# Patient Record
Sex: MALE | Race: WHITE | NOT HISPANIC OR LATINO | Employment: OTHER | URBAN - METROPOLITAN AREA
[De-identification: names, ages, dates, MRNs, and addresses within clinical notes are randomized per-mention and may not be internally consistent; named-entity substitution may affect disease eponyms.]

---

## 2019-12-09 ENCOUNTER — TELEPHONE (OUTPATIENT)
Dept: FAMILY MEDICINE CLINIC | Facility: CLINIC | Age: 74
End: 2019-12-09

## 2019-12-09 NOTE — TELEPHONE ENCOUNTER
Patient calling, states has Dr Jin Olson listed on his [facility] card, has never been patient here  Complains of elevated blood pressure 192/83  No pain, no headaches, no dizziness  He had seen a dentist for a broken tooth and is on antibiotic  Had BP done at dentist and checked again at [facility] and was around the same    Please advise

## 2019-12-09 NOTE — TELEPHONE ENCOUNTER
Called and left message for patient to call back and give us more information, and to schedule appt

## 2019-12-10 ENCOUNTER — OFFICE VISIT (OUTPATIENT)
Dept: FAMILY MEDICINE CLINIC | Facility: CLINIC | Age: 74
End: 2019-12-10
Payer: COMMERCIAL

## 2019-12-10 ENCOUNTER — TELEPHONE (OUTPATIENT)
Dept: FAMILY MEDICINE CLINIC | Facility: CLINIC | Age: 74
End: 2019-12-10

## 2019-12-10 VITALS
SYSTOLIC BLOOD PRESSURE: 180 MMHG | HEIGHT: 68 IN | HEART RATE: 80 BPM | RESPIRATION RATE: 16 BRPM | TEMPERATURE: 97.7 F | DIASTOLIC BLOOD PRESSURE: 90 MMHG | WEIGHT: 217 LBS | BODY MASS INDEX: 32.89 KG/M2

## 2019-12-10 DIAGNOSIS — I10 ESSENTIAL HYPERTENSION: Primary | ICD-10-CM

## 2019-12-10 DIAGNOSIS — E66.09 CLASS 1 OBESITY DUE TO EXCESS CALORIES WITH SERIOUS COMORBIDITY AND BODY MASS INDEX (BMI) OF 33.0 TO 33.9 IN ADULT: ICD-10-CM

## 2019-12-10 DIAGNOSIS — Z87.891 FORMER SMOKER: ICD-10-CM

## 2019-12-10 PROBLEM — E66.811 CLASS 1 OBESITY DUE TO EXCESS CALORIES WITH SERIOUS COMORBIDITY AND BODY MASS INDEX (BMI) OF 33.0 TO 33.9 IN ADULT: Status: ACTIVE | Noted: 2019-12-10

## 2019-12-10 PROCEDURE — 93000 ELECTROCARDIOGRAM COMPLETE: CPT | Performed by: NURSE PRACTITIONER

## 2019-12-10 PROCEDURE — 3725F SCREEN DEPRESSION PERFORMED: CPT | Performed by: NURSE PRACTITIONER

## 2019-12-10 PROCEDURE — 99204 OFFICE O/P NEW MOD 45 MIN: CPT | Performed by: NURSE PRACTITIONER

## 2019-12-10 RX ORDER — CHLORAL HYDRATE 500 MG
1000 CAPSULE ORAL DAILY
COMMUNITY

## 2019-12-10 RX ORDER — ASPIRIN 81 MG/1
81 TABLET ORAL DAILY
COMMUNITY

## 2019-12-10 NOTE — PATIENT INSTRUCTIONS
Start Lisinopril 10 mg one tab daily  Check blood pressure daily and keep a log of your BP readings  Bring this log to your follow up appointment in one week  Have blood work done    Blood work is fasting for 10-12 hours except for water and your Lisinopril     EKG today is normal     Limit salt intake and try to decrease caffeine

## 2019-12-10 NOTE — TELEPHONE ENCOUNTER
Spoke with patient and he reports to me that his last BP was 193/89  Patient denies SOB, chest pain, headaches, dizziness any symptoms of any kind  Patient told me that is had been over 10 plus years since he has seen a physician and back then he had always had a lower BP  I advised pt that is he developed any of the above symptoms to go the the ER right away  I also asked that he keep a BP log for us till his appt (he has to go to the pharmacy for that)  I put the patient with you on 12/16/19 @ 1pm, if you would like to see him earlier let me know and I can call him back

## 2019-12-10 NOTE — ASSESSMENT & PLAN NOTE
BP uncontrolled  Start Lisinopril 10 mg daily  This will likely need to be titrated up  Check BP twice daily and keep a log  Bring log to follow up appt in 1 week  Have lab work done now  EKG today showing SR, no acute changes or ST-T wave changes  Diet modifications reviewed, limit sodium intake, decrease caffeine consumption  Work on weight loss  I recommended having an echo done to r/o any structural defects/ valvular insufficiencies but he declined    Noted he was reluctant to come to the office today but his wife made him

## 2019-12-10 NOTE — ASSESSMENT & PLAN NOTE
Recommended having the low dose chest CT given his significant smoking history but he declined this option   He stopped smoking one week ago (cold turkey) and feels he is doing well with this

## 2019-12-10 NOTE — TELEPHONE ENCOUNTER
Patient phoned to state his blood pressure medicine was not sent to [address] [place] [address] Way  Patient can be contacted at [phone]

## 2019-12-10 NOTE — PROGRESS NOTES
Assessment/Plan:    Essential hypertension  BP uncontrolled  Start Lisinopril 10 mg daily  This will likely need to be titrated up  Check BP twice daily and keep a log  Bring log to follow up appt in 1 week  Have lab work done now  EKG today showing SR, no acute changes or ST-T wave changes  Diet modifications reviewed, limit sodium intake, decrease caffeine consumption  Work on weight loss  I recommended having an echo done to r/o any structural defects/ valvular insufficiencies but he declined  Noted he was reluctant to come to the office today but his wife made him    Former smoker  Recommended having the low dose chest CT given his significant smoking history but he declined this option   He stopped smoking one week ago (cold turkey) and feels he is doing well with this    Class 1 obesity due to excess calories with serious comorbidity and body mass index (BMI) of 33 0 to 33 9 in adult  Encouraged regular exercise, work on weight loss    We did discuss health maintenance today including CRC screening and vaccinations but he would like to deal with his HTN first seeing as he hasn't been to a doctor's office in over 10 years  We will re-visit these topics at his next appointment     Diagnoses and all orders for this visit:    Essential hypertension  -     CBC and differential; Future  -     Comprehensive metabolic panel; Future  -     Lipid panel; Future  -     TSH, 3rd generation with Free T4 reflex; Future  -     POCT ECG    Class 1 obesity due to excess calories with serious comorbidity and body mass index (BMI) of 33 0 to 33 9 in adult  -     CBC and differential; Future  -     Comprehensive metabolic panel; Future  -     Lipid panel; Future  -     TSH, 3rd generation with Free T4 reflex; Future    Former smoker    Other orders  -     Omega-3 Fatty Acids (FISH OIL) 1,000 mg; Take 1,000 mg by mouth daily  -     aspirin (ECOTRIN LOW STRENGTH) 81 mg EC tablet;  Take 81 mg by mouth daily          Subjective: Patient ID: Thersia Primrose is a 75 y o  male  HPI     Pt presents by himself today as a new patient    Pt was recently at his Dentist's office last Tuesday, 12/3/19 and is being treated for a broken tooth/ tooth abscess  His BP at that office was noted to be 192/83  Reports he was in a lot of pain that day  He has since [place] [facility] [place] has been getting similar BP readings   Last seen by a PCP provider over 10 years ago  Denies a H/O HTN or cardiovascular conditions   He denies any significant past medical history although he notes his best friend was a physician who he used to ask questions rather than formally being evaluated   He doesn't believe he has ever had blood work done    He denies chest pain, palpitations, edema, SOB, dizziness, tinnitus, headaches, visual changes  He reports he is able to walk up a flight of stairs with no issues    He is currently taking Fish Oil and ASA 81 mg on a daily basis    Drinks 6 cups of coffee daily   Diet is high in sodium     Former smoker, quit one week ago after being told his BP was elevated, smoked for at least 60 years ago, smoked 1 ppd   ETOH use- socially     Mom passed away of some type of cancer but he isn't sure which type   Dad passed away around early 76s with an MI    Current BMI at 32 99    The following portions of the patient's history were reviewed and updated as appropriate: allergies, current medications, past family history, past medical history, past social history, past surgical history and problem list     Review of Systems   Constitutional: Negative for chills, fatigue and fever  Respiratory: Negative for cough, shortness of breath and wheezing  Cardiovascular: Negative for chest pain, palpitations and leg swelling  Gastrointestinal: Negative for abdominal pain, blood in stool, constipation, diarrhea and nausea  Genitourinary: Negative for dysuria     Musculoskeletal: Negative for arthralgias and myalgias  Skin: Negative for rash and wound  Neurological: Negative for dizziness, weakness, numbness and headaches  Psychiatric/Behavioral: Negative for sleep disturbance and suicidal ideas  Objective:      BP (!) 180/90   Pulse 80   Temp 97 7 °F (36 5 °C) (Tympanic)   Resp 16   Ht 5' 8" (1 727 m)   Wt 98 4 kg (217 lb)   BMI 32 99 kg/m²          Physical Exam   Constitutional: He is oriented to person, place, and time  He appears well-developed and well-nourished  No distress  Obese   HENT:   Head: Normocephalic and atraumatic  Eyes: Pupils are equal, round, and reactive to light  Conjunctivae are normal    Wearing eyeglasses   Neck: Normal range of motion  Neck supple  No thyromegaly present  Cardiovascular: Normal rate, regular rhythm and normal heart sounds  No murmur heard  No LE edema  No carotid bruits   Pulmonary/Chest: Effort normal and breath sounds normal  No respiratory distress  He has no wheezes  Abdominal: Soft  Bowel sounds are normal  He exhibits no distension  There is no tenderness  Musculoskeletal: Normal range of motion  Lymphadenopathy:     He has no cervical adenopathy  Neurological: He is alert and oriented to person, place, and time  Skin: Skin is warm and dry  He is not diaphoretic  Psychiatric: He has a normal mood and affect  BMI Counseling: Body mass index is 32 99 kg/m²  The BMI is above normal  Nutrition recommendations include reducing portion sizes, decreasing overall calorie intake, 3-5 servings of fruits/vegetables daily, reducing fast food intake, consuming healthier snacks, decreasing soda and/or juice intake, moderation in carbohydrate intake, increasing intake of lean protein, reducing intake of saturated fat and trans fat and reducing intake of cholesterol  Exercise recommendations include exercising 3-5 times per week

## 2019-12-11 DIAGNOSIS — I10 ESSENTIAL HYPERTENSION: Primary | ICD-10-CM

## 2019-12-11 RX ORDER — LISINOPRIL 10 MG/1
10 TABLET ORAL DAILY
Qty: 30 TABLET | Refills: 5 | Status: SHIPPED | OUTPATIENT
Start: 2019-12-11 | End: 2019-12-16

## 2019-12-11 NOTE — TELEPHONE ENCOUNTER
Patient called and stated that Alvina Whelan was suppose to send a medication to the pharmacy when she saw him yesterday and it looks like she forgot  Can you please place the order for him   Thank you

## 2019-12-16 ENCOUNTER — OFFICE VISIT (OUTPATIENT)
Dept: FAMILY MEDICINE CLINIC | Facility: CLINIC | Age: 74
End: 2019-12-16
Payer: COMMERCIAL

## 2019-12-16 VITALS
SYSTOLIC BLOOD PRESSURE: 158 MMHG | TEMPERATURE: 97.8 F | DIASTOLIC BLOOD PRESSURE: 76 MMHG | HEART RATE: 56 BPM | WEIGHT: 216 LBS | HEIGHT: 68 IN | RESPIRATION RATE: 16 BRPM | BODY MASS INDEX: 32.74 KG/M2

## 2019-12-16 DIAGNOSIS — I10 ESSENTIAL HYPERTENSION: Primary | ICD-10-CM

## 2019-12-16 DIAGNOSIS — Z23 NEED FOR PNEUMOCOCCAL VACCINATION: ICD-10-CM

## 2019-12-16 DIAGNOSIS — E66.09 CLASS 1 OBESITY DUE TO EXCESS CALORIES WITH SERIOUS COMORBIDITY AND BODY MASS INDEX (BMI) OF 33.0 TO 33.9 IN ADULT: ICD-10-CM

## 2019-12-16 DIAGNOSIS — E78.2 MIXED HYPERLIPIDEMIA: Primary | ICD-10-CM

## 2019-12-16 DIAGNOSIS — Z87.891 FORMER SMOKER: ICD-10-CM

## 2019-12-16 LAB
ALBUMIN SERPL-MCNC: 4.1 G/DL (ref 3.5–4.8)
ALBUMIN/GLOB SERPL: 1.4 {RATIO} (ref 1.2–2.2)
ALP SERPL-CCNC: 137 IU/L (ref 39–117)
ALT SERPL-CCNC: 28 IU/L (ref 0–44)
AST SERPL-CCNC: 22 IU/L (ref 0–40)
BASOPHILS # BLD AUTO: 0.1 X10E3/UL (ref 0–0.2)
BASOPHILS NFR BLD AUTO: 1 %
BILIRUB SERPL-MCNC: 0.3 MG/DL (ref 0–1.2)
BUN SERPL-MCNC: 15 MG/DL (ref 8–27)
BUN/CREAT SERPL: 13 (ref 10–24)
CALCIUM SERPL-MCNC: 9 MG/DL (ref 8.6–10.2)
CHLORIDE SERPL-SCNC: 104 MMOL/L (ref 96–106)
CHOLEST SERPL-MCNC: 184 MG/DL (ref 100–199)
CO2 SERPL-SCNC: 22 MMOL/L (ref 20–29)
CREAT SERPL-MCNC: 1.15 MG/DL (ref 0.76–1.27)
EOSINOPHIL # BLD AUTO: 0.5 X10E3/UL (ref 0–0.4)
EOSINOPHIL NFR BLD AUTO: 6 %
ERYTHROCYTE [DISTWIDTH] IN BLOOD BY AUTOMATED COUNT: 13.1 % (ref 12.3–15.4)
GLOBULIN SER-MCNC: 2.9 G/DL (ref 1.5–4.5)
GLUCOSE SERPL-MCNC: 97 MG/DL (ref 65–99)
HCT VFR BLD AUTO: 40.8 % (ref 37.5–51)
HDLC SERPL-MCNC: 26 MG/DL
HGB BLD-MCNC: 13.7 G/DL (ref 13–17.7)
IMM GRANULOCYTES # BLD: 0 X10E3/UL (ref 0–0.1)
IMM GRANULOCYTES NFR BLD: 0 %
LDLC SERPL CALC-MCNC: 105 MG/DL (ref 0–99)
LYMPHOCYTES # BLD AUTO: 1.7 X10E3/UL (ref 0.7–3.1)
LYMPHOCYTES NFR BLD AUTO: 20 %
MCH RBC QN AUTO: 31.3 PG (ref 26.6–33)
MCHC RBC AUTO-ENTMCNC: 33.6 G/DL (ref 31.5–35.7)
MCV RBC AUTO: 93 FL (ref 79–97)
MONOCYTES # BLD AUTO: 0.8 X10E3/UL (ref 0.1–0.9)
MONOCYTES NFR BLD AUTO: 10 %
NEUTROPHILS # BLD AUTO: 5.4 X10E3/UL (ref 1.4–7)
NEUTROPHILS NFR BLD AUTO: 63 %
PLATELET # BLD AUTO: 350 X10E3/UL (ref 150–450)
POTASSIUM SERPL-SCNC: 4.4 MMOL/L (ref 3.5–5.2)
PROT SERPL-MCNC: 7 G/DL (ref 6–8.5)
RBC # BLD AUTO: 4.38 X10E6/UL (ref 4.14–5.8)
SL AMB EGFR AFRICAN AMERICAN: 72 ML/MIN/1.73
SL AMB EGFR NON AFRICAN AMERICAN: 62 ML/MIN/1.73
SL AMB VLDL CHOLESTEROL CALC: 53 MG/DL (ref 5–40)
SODIUM SERPL-SCNC: 140 MMOL/L (ref 134–144)
TRIGL SERPL-MCNC: 263 MG/DL (ref 0–149)
TSH SERPL DL<=0.005 MIU/L-ACNC: 1.43 UIU/ML (ref 0.45–4.5)
WBC # BLD AUTO: 8.5 X10E3/UL (ref 3.4–10.8)

## 2019-12-16 PROCEDURE — 1160F RVW MEDS BY RX/DR IN RCRD: CPT | Performed by: NURSE PRACTITIONER

## 2019-12-16 PROCEDURE — 4040F PNEUMOC VAC/ADMIN/RCVD: CPT

## 2019-12-16 PROCEDURE — 90670 PCV13 VACCINE IM: CPT

## 2019-12-16 PROCEDURE — G0009 ADMIN PNEUMOCOCCAL VACCINE: HCPCS

## 2019-12-16 PROCEDURE — 1101F PT FALLS ASSESS-DOCD LE1/YR: CPT | Performed by: NURSE PRACTITIONER

## 2019-12-16 PROCEDURE — 1036F TOBACCO NON-USER: CPT | Performed by: NURSE PRACTITIONER

## 2019-12-16 PROCEDURE — 99214 OFFICE O/P EST MOD 30 MIN: CPT | Performed by: NURSE PRACTITIONER

## 2019-12-16 RX ORDER — LISINOPRIL 20 MG/1
20 TABLET ORAL DAILY
Qty: 30 TABLET | Refills: 5 | Status: SHIPPED | OUTPATIENT
Start: 2019-12-16 | End: 2020-07-09 | Stop reason: SINTOL

## 2019-12-16 RX ORDER — ATORVASTATIN CALCIUM 10 MG/1
10 TABLET, FILM COATED ORAL DAILY
Qty: 30 TABLET | Refills: 5 | Status: SHIPPED | OUTPATIENT
Start: 2019-12-16 | End: 2022-03-01

## 2019-12-16 NOTE — ASSESSMENT & PLAN NOTE
BP improved but still not optimized  Increase Lisinopril to 20 mg daily   Continue to check BP twice daily and call with readings in one week  Follow a low sodium diet

## 2019-12-16 NOTE — PATIENT INSTRUCTIONS
Increase Lisinopril to 20 mg once daily (this was sent to your pharmacy)   Continue to monitor Blood pressure daily and call with readings in one week  Limit your sodium intake  We will call you with your lab results once we have them   You may take over-the-counter Tylenol as needed, max of 3,000mg in one day  Limit use of Aleve   Return to our office in one month

## 2019-12-16 NOTE — PROGRESS NOTES
Chief Complaint   Patient presents with    Follow-up     hypertension     Health Maintenance   Topic Date Due    Medicare Annual Wellness Visit (AWV)  1945    CRC Screening: Colonoscopy  1945    DTaP,Tdap,and Td Vaccines (1 - Tdap) 11/10/1955    Fall Risk  11/10/2009    Pneumococcal Vaccine: 65+ Years (1 of 2 - PCV13) 11/10/2009    Influenza Vaccine  07/01/2019    Depression Screening PHQ  12/10/2020    BMI: Followup Plan  12/10/2020    BMI: Adult  12/10/2020    Pneumococcal Vaccine: Pediatrics (0 to 5 Years) and At-Risk Patients (6 to 59 Years)  Aged Out    HIB Vaccine  Aged Out    Hepatitis B Vaccine  Aged Out    IPV Vaccine  Aged Out    Hepatitis A Vaccine  Aged Out    Meningococcal ACWY Vaccine  Aged Out    HPV Vaccine  Aged Out     Assessment/Plan:    Declined the flu vaccine today  Tdap not covered by his insurance  He did receive Prevnar today in the office     He has no desire to discuss CRC screenings at this time  I did inform him of all available options for this but he declined    Awaiting lab results  We will call him with instructions   RTO one month for routine follow up and AWV    Essential hypertension  BP improved but still not optimized  Increase Lisinopril to 20 mg daily   Continue to check BP twice daily and call with readings in one week  Follow a low sodium diet    Former smoker  2 weeks without smoking- congratulated on this    Class 1 obesity due to excess calories with serious comorbidity and body mass index (BMI) of 33 0 to 33 9 in adult  Encouraged regular physical activity, follow a healthy diet       Diagnoses and all orders for this visit:    Essential hypertension  -     lisinopril (ZESTRIL) 20 mg tablet;  Take 1 tablet (20 mg total) by mouth daily    Need for pneumococcal vaccination  -     PNEUMOCOCCAL CONJUGATE VACCINE 13-VALENT GREATER THAN 6 MONTHS    Class 1 obesity due to excess calories with serious comorbidity and body mass index (BMI) of 33 0 to 33 9 in adult    Former smoker          Subjective:      Patient ID: Jonel Strong is a 75 y o  male  HPI    Pt presents by himself today for a one week follow up of HTN and to review lab work   He did have his blood work completed last week at [facility] but we unfortunately, do no have these results yet     HTN- taking Lisinopril 10 mg daily  BP has been trending down nicely but still hanging in the 140s/80s  Denies chest pain, palpitations, edema, SOB, dizziness  Still hasn't smoked in the past 2 weeks  Notes he is significantly more active in the Spring as he is an avid mayra, doesn't do too much in regards to physical activity this time of year   Still drinking approximately 6 cups of coffee daily   No changes to his diet as of yet but he does plan to work on this     He is unsure of his last Tdap vaccine  Due for Prevnar and the flu vaccine    He has never had a colonoscopy     The following portions of the patient's history were reviewed and updated as appropriate: allergies, current medications, past family history, past medical history, past social history and problem list     Review of Systems   Constitutional: Negative for chills, fatigue and fever  Respiratory: Negative for cough, shortness of breath and wheezing  Cardiovascular: Negative for chest pain, palpitations and leg swelling  Gastrointestinal: Negative for abdominal pain, blood in stool, constipation, diarrhea and nausea  Genitourinary: Negative for dysuria  Musculoskeletal: Negative for arthralgias and myalgias  Skin: Negative for rash and wound  Neurological: Negative for dizziness, weakness, numbness and headaches  Psychiatric/Behavioral: Negative for sleep disturbance and suicidal ideas           Objective:      /76   Pulse 56   Temp 97 8 °F (36 6 °C) (Tympanic)   Resp 16   Ht 5' 8" (1 727 m)   Wt 98 kg (216 lb)   BMI 32 84 kg/m²          Physical Exam   Constitutional: He is oriented to person, place, and time  He appears well-developed and well-nourished  No distress  Obese    HENT:   Head: Normocephalic and atraumatic  Eyes: Pupils are equal, round, and reactive to light  Conjunctivae are normal    +eyeglasses   Neck: Normal range of motion  Neck supple  No thyromegaly present  Cardiovascular: Normal rate, regular rhythm and normal heart sounds  No murmur heard  No LE edema  No carotid bruits    Pulmonary/Chest: Effort normal and breath sounds normal  No respiratory distress  He has no wheezes  Abdominal: Soft  Bowel sounds are normal  He exhibits no distension  There is no tenderness  Musculoskeletal: Normal range of motion  Lymphadenopathy:     He has no cervical adenopathy  Neurological: He is alert and oriented to person, place, and time  Skin: Skin is warm and dry  He is not diaphoretic  Psychiatric: He has a normal mood and affect

## 2019-12-27 ENCOUNTER — TELEPHONE (OUTPATIENT)
Dept: FAMILY MEDICINE CLINIC | Facility: CLINIC | Age: 74
End: 2019-12-27

## 2019-12-27 NOTE — TELEPHONE ENCOUNTER
Spoke with pt after reviewing blood pressure log for the past 10 days  BP looks much improved overall, SBP ranges 120-141, mostly in the 130s, DBP ranges 68-86  He had one reading of 99/69 but notes this was around 5am in the morning prior to a hunting trip    He was not symptomatic with this BP reading    Plan:  Continue Lisinopril 20 mg daily, check BP BID and keep log  Bring log to f/u appt 1/13/20

## 2020-01-13 ENCOUNTER — OFFICE VISIT (OUTPATIENT)
Dept: FAMILY MEDICINE CLINIC | Facility: CLINIC | Age: 75
End: 2020-01-13
Payer: COMMERCIAL

## 2020-01-13 VITALS
SYSTOLIC BLOOD PRESSURE: 170 MMHG | RESPIRATION RATE: 20 BRPM | HEART RATE: 80 BPM | WEIGHT: 224 LBS | HEIGHT: 68 IN | TEMPERATURE: 97.4 F | OXYGEN SATURATION: 94 % | BODY MASS INDEX: 33.95 KG/M2 | DIASTOLIC BLOOD PRESSURE: 82 MMHG

## 2020-01-13 DIAGNOSIS — Z00.00 MEDICARE ANNUAL WELLNESS VISIT, INITIAL: ICD-10-CM

## 2020-01-13 DIAGNOSIS — M19.90 ARTHRITIS: ICD-10-CM

## 2020-01-13 DIAGNOSIS — Z87.891 FORMER SMOKER: ICD-10-CM

## 2020-01-13 DIAGNOSIS — I10 ESSENTIAL HYPERTENSION: Primary | ICD-10-CM

## 2020-01-13 DIAGNOSIS — E78.2 MIXED HYPERLIPIDEMIA: ICD-10-CM

## 2020-01-13 PROCEDURE — 99214 OFFICE O/P EST MOD 30 MIN: CPT | Performed by: FAMILY MEDICINE

## 2020-01-13 PROCEDURE — 1036F TOBACCO NON-USER: CPT | Performed by: FAMILY MEDICINE

## 2020-01-13 PROCEDURE — 1101F PT FALLS ASSESS-DOCD LE1/YR: CPT | Performed by: FAMILY MEDICINE

## 2020-01-13 PROCEDURE — 1125F AMNT PAIN NOTED PAIN PRSNT: CPT | Performed by: FAMILY MEDICINE

## 2020-01-13 PROCEDURE — G0438 PPPS, INITIAL VISIT: HCPCS | Performed by: FAMILY MEDICINE

## 2020-01-13 PROCEDURE — 1170F FXNL STATUS ASSESSED: CPT | Performed by: FAMILY MEDICINE

## 2020-01-13 PROCEDURE — 1160F RVW MEDS BY RX/DR IN RCRD: CPT | Performed by: FAMILY MEDICINE

## 2020-01-13 PROCEDURE — 3725F SCREEN DEPRESSION PERFORMED: CPT | Performed by: FAMILY MEDICINE

## 2020-01-13 PROCEDURE — 3288F FALL RISK ASSESSMENT DOCD: CPT | Performed by: FAMILY MEDICINE

## 2020-01-13 NOTE — PROGRESS NOTES
Chief Complaint   Patient presents with   [facility] Wellness Visit     Initial     Follow-up     From visit on 12/16/19  Health Maintenance   Topic Date Due    Medicare Annual Wellness Visit (AWV)  1945    CRC Screening: Colonoscopy  1945    DTaP,Tdap,and Td Vaccines (1 - Tdap) 11/10/1955    Influenza Vaccine  07/01/2019    Depression Screening PHQ  12/10/2020    BMI: Followup Plan  12/10/2020    Fall Risk  12/16/2020    BMI: Adult  12/16/2020    Pneumococcal Vaccine: 65+ Years (2 of 2 - PPSV23) 12/16/2020    Pneumococcal Vaccine: Pediatrics (0 to 5 Years) and At-Risk Patients (6 to 59 Years)  Aged Out    HIB Vaccine  Aged Out    Hepatitis B Vaccine  Aged Out    IPV Vaccine  Aged Out    Hepatitis A Vaccine  Aged Out    Meningococcal ACWY Vaccine  Aged Out    HPV Vaccine  Aged Out      Assessment and Plan:     Problem List Items Addressed This Visit        Cardiovascular and Mediastinum    Essential hypertension - Primary     BP at home are good  DASH diet  Continue lisinopril 20mg QD  Musculoskeletal and Integument    Arthritis     Continue aleve daily as needed  SE educated pt  Take it with food and keep hydrated  Other    BMI 34 0-34 9,adult    Former smoker     Quit smoking for 2 months  60 ppd years  Refused CT lung screen  Mixed hyperlipidemia     Low fat diet  Continue atorvastatin 10mg qhs  Other Visit Diagnoses     Medicare annual wellness visit, initial            BMI Counseling: Body mass index is 34 06 kg/m²  The BMI is above normal  Nutrition recommendations include decreasing portion sizes, encouraging healthy choices of fruits and vegetables and decreasing fast food intake  Exercise recommendations include moderate physical activity 150 minutes/week  Preventive health issues were discussed with patient, and age appropriate screening tests were ordered as noted in patient's After Visit Summary    [place] advice and appropriate referrals for health education or preventive services given if needed, as noted in patient's After Visit Summary  History of Present Illness:     Patient presents for Medicare Annual Wellness visit    Patient Care Team:  Torrey Stauffer MD as PCP - General (Family Medicine)     Problem List:     Patient Active Problem List   Diagnosis    Essential hypertension    BMI 34 0-34 9,adult    Former smoker    Mixed hyperlipidemia    Arthritis      Past Medical and Surgical History:     Past Medical History:   Diagnosis Date    Back pain      History reviewed  No pertinent surgical history     Family History:     Family History   Problem Relation Age of Onset    Cancer Mother     Heart attack Father 48      Social History:     Social History     Socioeconomic History    Marital status: /Civil Union     Spouse name: None    Number of children: None    Years of education: None    Highest education level: None   Occupational History    None   Social Needs    Financial resource strain: Not hard at all   [employer] insecurity:     Worry: Never true     Inability: Never true    Transportation needs:     Medical: No     Non-medical: No   Tobacco Use    Smoking status: Former Smoker     Packs/day: 1 00     Types: Cigarettes     Start date: 11/10/1956     Last attempt to quit: 12/3/2019     Years since quittin 1    Smokeless tobacco: Never Used   Substance and Sexual Activity    Alcohol use: Yes     Frequency: Monthly or less     Binge frequency: Never    Drug use: Never    Sexual activity: Yes     Partners: Female   Lifestyle    Physical activity:     Days per week: 1 day     Minutes per session: 30 min    Stress: Not at all   Relationships    Social connections:     Talks on phone: More than three times a week     Gets together: More than three times a week     Attends Sikhism service: More than 4 times per year     Active member of club or organization: Yes     Attends meetings of clubs or organizations: More than 4 times per year     Relationship status:     Intimate partner violence:     Fear of current or ex partner: No     Emotionally abused: No     Physically abused: No     Forced sexual activity: No   Other Topics Concern    None   Social History Narrative    None       Medications and Allergies:     Current Outpatient Medications   Medication Sig Dispense Refill    aspirin (ECOTRIN LOW STRENGTH) 81 mg EC tablet Take 81 mg by mouth daily      atorvastatin (LIPITOR) 10 mg tablet Take 1 tablet (10 mg total) by mouth daily 30 tablet 5    lisinopril (ZESTRIL) 20 mg tablet Take 1 tablet (20 mg total) by mouth daily 30 tablet 5    Omega-3 Fatty Acids (FISH OIL) 1,000 mg Take 1,000 mg by mouth daily       No current facility-administered medications for this visit  No Known Allergies   Immunizations:     Immunization History   Administered Date(s) Administered    Pneumococcal Conjugate 13-Valent 12/16/2019      Health Maintenance:         Topic Date Due    CRC Screening: Colonoscopy  1945         Topic Date Due    DTaP,Tdap,and Td Vaccines (1 - Tdap) 11/10/1955    Influenza Vaccine  07/01/2019      Medicare Health Risk Assessment:     Vitals:    01/13/20 1250   BP: 170/82   BP Location: Left arm   Patient Position: Sitting   Cuff Size: Adult   Pulse: 80   Resp: 20   Temp: (!) 97 4 °F (36 3 °C)   TempSrc: Tympanic   SpO2: 94%   Weight: 102 kg (224 lb)   Height: 5' 8" (1 727 m)     Cameron Ariza is here for his Initial Wellness visit  Health Risk Assessment:   Patient rates overall health as very good  Patient feels that their physical health rating is same  Eyesight was rated as same  Hearing was rated as same  Patient feels that their emotional and mental health rating is same  Pain experienced in the last 7 days has been none  Depression Screening:   PHQ-2 Score: 0      Fall Risk Screening:    In the past year, patient has experienced: history of falling in past year    Injured during fall?: Yes    Feels unsteady when standing or walking?: No    Worried about falling?: No      Home Safety:  Patient does not have trouble with stairs inside or outside of their home  Patient has working smoke alarms and has working carbon monoxide detector  Home safety hazards include: none  Nutrition:   Current diet is Regular  Medications:   Patient is not currently taking any over-the-counter supplements  Patient is able to manage medications  Activities of Daily Living (ADLs)/Instrumental Activities of Daily Living (IADLs):   Walk and transfer into and out of bed and chair?: Yes  Dress and groom yourself?: Yes    Bathe or shower yourself?: Yes    Feed yourself? Yes  Do your laundry/housekeeping?: Yes  Manage your money, pay your bills and track your expenses?: Yes  Make your own meals?: Yes    Do your own shopping?: Yes    Previous Hospitalizations:   Any hospitalizations or ED visits within the last 12 months?: No      Advance Care Planning:   Living will: Yes    Durable POA for healthcare:  Yes    Advanced directive: Yes    Provider agrees with end of life decisions: Yes      Cognitive Screening:   Provider or family/friend/caregiver concerned regarding cognition?: No    PREVENTIVE SCREENINGS      Cardiovascular Screening:    General: History Lipid Disorder and Screening Current      Diabetes Screening:     General: Screening Current      Colorectal Cancer Screening:     General: Risks and Benefits Discussed and Patient Declines      Prostate Cancer Screening:    General: Screening Not Indicated      Abdominal Aortic Aneurysm (AAA) Screening:    Risk factors include: age between 73-69 yo and tobacco use        General: Risks and Benefits Discussed and Patient Halima Brooks MD

## 2020-01-13 NOTE — PROGRESS NOTES
Assessment/Plan:    Essential hypertension  BP at home are good  DASH diet  Continue lisinopril 20mg QD  Arthritis  Continue aleve daily as needed  SE educated pt  Take it with food and keep hydrated  Mixed hyperlipidemia  Low fat diet  Continue atorvastatin 10mg qhs  Former smoker  Quit smoking for 2 months  60 ppd years  Refused CT lung screen  BP elevated in office today  Pt denies symptoms like headache, vision change, SOB or chest pain  DASH diet  Check BP at home 2/day and call office if BP always >140/90  Since BP at home is good, continue same lisinopril 20mg QD  Refused flu shot  Got PCV13 in 12/2019  Will get pneumovax 12/2020  Refused Tdap or shingrix  Colonoscopy 15 years ago  Refused FIT or colonoscopy now  RTO in 3 months with BP machine and BP log  POA---son Christine Silva  Living will----Do not remember now per pt  Will bring copy  Diagnoses and all orders for this visit:    Essential hypertension    Mixed hyperlipidemia    Arthritis    BMI 34 0-34 9,adult    Former smoker    Medicare annual wellness visit, initial    Other orders  -     Cancel: Ambulatory referral to Gastroenterology; Future          Subjective:      Patient ID: Taya Guallpa is a 75 y o  male  Hypertension   Patient is a 75 y o  male who presents for follow-up of hypertension  His high blood pressure was first noted 1 month ago  Home blood pressure readings: usual 130/80  Salt intake and diet: salt not added to cooking  Salt intake and diet: salt not added to cooking  Usual weight: 224 lbs  Associated signs and symptoms: none  Associated signs and symptoms: none  Patient denies: blurred vision, chest pain, dyspnea, headache, orthopnea and palpitations  Use of agents associated with hypertension: none  Medication compliance: taking as prescribed      The following portions of the patient's history were reviewed and updated as appropriate: allergies, current medications, past family history, past medical history, past social history, past surgical history and problem list               Pt is here by himself  HTN----start lisinopril 20mg QD 1 month ago  Denies side effects  BP at home 105-142/70-93, usually good 130/80  Denies lightheaded, dizzy, SOB or chest pain  Hyperlipidemia---Started atorvastatin 10mg qhs  Denies side effects  Arthritis----Hands pain, sometimes knees pain  Aleve helped  Tylenol did not help  Quit smoking 2 months ago  1ppd for 60 years  Refused CT lung  Social alcohol  No drugs  Retired  Lives with wife and his son  Does all ADL's  Likes camping, hunting  Denies recent falls  Denies depression  The following portions of the patient's history were reviewed and updated as appropriate: allergies, current medications, past family history, past medical history, past social history, past surgical history and problem list     Review of Systems   Constitutional: Negative for appetite change, chills and fever  HENT: Negative for congestion, ear pain, sinus pain and sore throat  Eyes: Negative for discharge and itching  Respiratory: Negative for apnea, cough, chest tightness, shortness of breath and wheezing  Cardiovascular: Negative for chest pain, palpitations and leg swelling  Gastrointestinal: Negative for abdominal pain, anal bleeding, constipation, diarrhea, nausea and vomiting  Endocrine: Negative for cold intolerance, heat intolerance and polyuria  Genitourinary: Negative for difficulty urinating and dysuria  Musculoskeletal: Negative for arthralgias, back pain and myalgias  Skin: Negative for rash  Neurological: Negative for dizziness and headaches  Psychiatric/Behavioral: Negative for agitation           Objective:      /82 (BP Location: Left arm, Patient Position: Sitting, Cuff Size: Adult)   Pulse 80   Temp (!) 97 4 °F (36 3 °C) (Tympanic)   Resp 20   Ht 5' 8" (1 727 m)   Wt 102 kg (224 lb)   SpO2 94%   BMI 34 06 kg/m²          Physical Exam   Constitutional: He appears well-developed  HENT:   Head: Normocephalic and atraumatic  Right Ear: External ear normal    Left Ear: External ear normal    Nose: Nose normal    Mouth/Throat: Oropharynx is clear and moist    Eyes: Pupils are equal, round, and reactive to light  Conjunctivae are normal  Right eye exhibits no discharge  Left eye exhibits no discharge  Neck: Normal range of motion  Neck supple  Cardiovascular: Normal rate, regular rhythm, normal heart sounds and intact distal pulses  Exam reveals no friction rub  No murmur heard  Pulmonary/Chest: Effort normal and breath sounds normal  No stridor  No respiratory distress  He has no wheezes  He has no rales  Abdominal: Soft  Bowel sounds are normal    Musculoskeletal: Normal range of motion  He exhibits no edema  Neurological: He is alert

## 2020-03-19 ENCOUNTER — TELEPHONE (OUTPATIENT)
Dept: FAMILY MEDICINE CLINIC | Facility: CLINIC | Age: 75
End: 2020-03-19

## 2020-06-25 ENCOUNTER — TELEMEDICINE (OUTPATIENT)
Dept: FAMILY MEDICINE CLINIC | Facility: CLINIC | Age: 75
End: 2020-06-25
Payer: COMMERCIAL

## 2020-06-25 DIAGNOSIS — I10 ESSENTIAL HYPERTENSION: ICD-10-CM

## 2020-06-25 DIAGNOSIS — E78.2 MIXED HYPERLIPIDEMIA: ICD-10-CM

## 2020-06-25 DIAGNOSIS — Z12.11 SCREENING FOR COLON CANCER: Primary | ICD-10-CM

## 2020-06-25 PROCEDURE — G2012 BRIEF CHECK IN BY MD/QHP: HCPCS | Performed by: NURSE PRACTITIONER

## 2020-07-09 ENCOUNTER — TELEMEDICINE (OUTPATIENT)
Dept: FAMILY MEDICINE CLINIC | Facility: CLINIC | Age: 75
End: 2020-07-09
Payer: COMMERCIAL

## 2020-07-09 VITALS — DIASTOLIC BLOOD PRESSURE: 78 MMHG | SYSTOLIC BLOOD PRESSURE: 110 MMHG

## 2020-07-09 DIAGNOSIS — I10 ESSENTIAL HYPERTENSION: ICD-10-CM

## 2020-07-09 DIAGNOSIS — Z12.11 SCREENING FOR COLON CANCER: Primary | ICD-10-CM

## 2020-07-09 PROCEDURE — 99442 PR PHYS/QHP TELEPHONE EVALUATION 11-20 MIN: CPT | Performed by: NURSE PRACTITIONER

## 2020-07-09 NOTE — PROGRESS NOTES
Chief Complaint   Patient presents with    Virtual Brief Visit     Disscuss blood pressure     Health Maintenance   Topic Date Due    CRC Screening: Colonoscopy  1945    DTaP,Tdap,and Td Vaccines (1 - Tdap) 11/10/1955    Falls: Plan of Care  11/10/2009    Influenza Vaccine  07/01/2020    Pneumococcal Vaccine: 65+ Years (2 of 2 - PPSV23) 12/16/2020    Fall Risk  01/13/2021    Depression Screening PHQ  01/13/2021    Medicare Annual Wellness Visit (AWV)  01/13/2021    BMI: Followup Plan  01/13/2021    BMI: Adult  01/13/2021    Pneumococcal Vaccine: Pediatrics (0 to 5 Years) and At-Risk Patients (6 to 59 Years)  Aged Out    HIB Vaccine  Aged Out    Hepatitis B Vaccine  Aged Out    IPV Vaccine  Aged Out    Hepatitis A Vaccine  Aged Out    Meningococcal ACWY Vaccine  Aged Out    HPV Vaccine  Aged Out       Virtual Brief Visit    Assessment/Plan:    Problem List Items Addressed This Visit        Cardiovascular and Mediastinum    Essential hypertension     BP log reviewed today and is acceptable  He has a few spot elevations in the 130s/90 but most are in the 110s/70s   We'll discontinue the Lisinopril at this time  I did ask him to continue checking his BP twice daily and to keep a log  Drop log off in one month  Call if BP readings start trending up at all  RTO 3 months in person            Other Visit Diagnoses     Screening for colon cancer    -  Primary                Reason for visit is   Chief Complaint   Patient presents with    Virtual Brief Visit     Disscuss blood pressure        Encounter provider YAQUELIN Jacinto    Provider located at [address] [place] [id number]    Recent Visits  No visits were found meeting these conditions     Showing recent visits within past 7 days and meeting all other requirements     Today's Visits  Date Type Provider Dept   07/09/20 Telemedicine YAQUELIN Jacinto [address] [facility] today's visits and meeting all other requirements     Future Appointments  No visits were found meeting these conditions  Showing future appointments within next 150 days and meeting all other requirements        After connecting through telephone, the patient was identified by name and date of birth  Patsy Mixon was informed that this is a telemedicine visit and that the visit is being conducted through telephone  My office door was closed  No one else was in the room  He acknowledged consent and understanding of privacy and security of the platform  The patient has agreed to participate and understands he can discontinue the visit at any time  Patient is aware this is a billable service  It was my intent to perform this visit via video technology but the patient was not able to do a video connection so the visit was completed via audio telephone only  Nick Shin is a 75 y o  male   HPI     Pt presents via telephone today to discuss his HTN  He has been holding Lisinopril 20mg for the past 2 weeks  He did drop off his BP log yesterday for review  Bps ranging / 72-90, typically in the 110's/ 70s  He reports his dry cough and needing to clear his throat has completely stopped  Denies chest pain, palpitations, edema, SOB, dizziness, headaches   Noes he feels better overall with stopping the Lisinopril     Past Medical History:   Diagnosis Date    Back pain        No past surgical history on file  Current Outpatient Medications   Medication Sig Dispense Refill    aspirin (ECOTRIN LOW STRENGTH) 81 mg EC tablet Take 81 mg by mouth daily      atorvastatin (LIPITOR) 10 mg tablet Take 1 tablet (10 mg total) by mouth daily 30 tablet 5    Omega-3 Fatty Acids (FISH OIL) 1,000 mg Take 1,000 mg by mouth daily       No current facility-administered medications for this visit           Allergies   Allergen Reactions    Lisinopril Cough Review of Systems   Constitutional: Negative for chills, fatigue and fever  Respiratory: Negative for cough, shortness of breath and wheezing  Cardiovascular: Negative for chest pain, palpitations and leg swelling  Gastrointestinal: Negative for abdominal pain, blood in stool, constipation, diarrhea and nausea  Genitourinary: Negative for dysuria  Musculoskeletal: Negative for arthralgias and myalgias  Skin: Negative for rash and wound  Neurological: Negative for dizziness, weakness, numbness and headaches  Psychiatric/Behavioral: Negative for sleep disturbance and suicidal ideas  Vitals:    07/09/20 0925   BP: 110/78         I spent 16 minutes directly with the patient during this visit    [address] acknowledges that he has consented to an online visit or consultation  He understands that the online visit is based solely on information provided by him, and that, in the absence of a face-to-face physical evaluation by the physician, the diagnosis he receives is both limited and provisional in terms of accuracy and completeness  This is not intended to replace a full medical face-to-face evaluation by the physician  Lourdes Hawley understands and accepts these terms

## 2020-07-09 NOTE — ASSESSMENT & PLAN NOTE
BP log reviewed today and is acceptable  He has a few spot elevations in the 130s/90 but most are in the 110s/70s   We'll discontinue the Lisinopril at this time  I did ask him to continue checking his BP twice daily and to keep a log  Drop log off in one month    Call if BP readings start trending up at all  RTO 3 months in person

## 2020-08-17 ENCOUNTER — TELEPHONE (OUTPATIENT)
Dept: FAMILY MEDICINE CLINIC | Facility: CLINIC | Age: 75
End: 2020-08-17

## 2020-08-17 NOTE — TELEPHONE ENCOUNTER
Pt  said he would like to talk to Dr Blue Cohen re: Lisinopril  He said he needs a refill, he is on 20mg, but he wants to talk to her about side effects he was having  He said Dr Blue Cohen talked to him before she went on vacation   He uses [facility]

## 2020-08-17 NOTE — TELEPHONE ENCOUNTER
Spoke with patient having side effects from Lisinopril dry throat in the morning and dry cough per patient felt like coughing but couldn't cough

## 2020-08-18 DIAGNOSIS — H00.011 HORDEOLUM EXTERNUM OF RIGHT UPPER EYELID: ICD-10-CM

## 2020-08-18 DIAGNOSIS — I10 ESSENTIAL HYPERTENSION: Primary | ICD-10-CM

## 2020-08-18 RX ORDER — LOSARTAN POTASSIUM 25 MG/1
25 TABLET ORAL DAILY
Qty: 30 TABLET | Refills: 5 | Status: SHIPPED | OUTPATIENT
Start: 2020-08-18 | End: 2020-10-26 | Stop reason: DRUGHIGH

## 2020-08-18 RX ORDER — ERYTHROMYCIN 5 MG/G
0.5 OINTMENT OPHTHALMIC EVERY 6 HOURS SCHEDULED
Qty: 3.5 G | Refills: 0 | Status: SHIPPED | OUTPATIENT
Start: 2020-08-18 | End: 2020-10-26

## 2020-08-18 NOTE — TELEPHONE ENCOUNTER
Spoke with patient  He was taking Lisinopril several weeks ago  He developed a dry cough and held Lisinopril  Cough improved and BP actually did okay  We decided to continue holding the Lisinopril at that time  His wife ended up in the hospital and came home on Hospice  She passed away last week on 8/10/2020  During this time, he noticed his BP trending back up to 140-150s/90s and he restarted the Lisinopril  His dry cough has returned  Denies chest pain, palpitations, edema, SOB, dizziness, headaches  His family is being very supportive and he feels he is doing well overall  He got to visit his great grandson (6 months old) yesterday which he enjoyed    He also c/o a stye to his right eye which he has had for the past 1 5 weeks  Not painful but irritating  No visual changes or eye drainage     Plan:  Stop Lisinopril  Start Losartan 25 mg one tab daily  Call with BP readings in one week  Limit sodium intake   For his stye, start using Erythromycin ophthalmic ointment    Warm compresses to the right eye

## 2020-10-26 ENCOUNTER — OFFICE VISIT (OUTPATIENT)
Dept: FAMILY MEDICINE CLINIC | Facility: CLINIC | Age: 75
End: 2020-10-26
Payer: COMMERCIAL

## 2020-10-26 VITALS
TEMPERATURE: 98.1 F | SYSTOLIC BLOOD PRESSURE: 162 MMHG | HEIGHT: 68 IN | RESPIRATION RATE: 16 BRPM | HEART RATE: 88 BPM | WEIGHT: 222 LBS | DIASTOLIC BLOOD PRESSURE: 86 MMHG | BODY MASS INDEX: 33.65 KG/M2

## 2020-10-26 DIAGNOSIS — I10 ESSENTIAL HYPERTENSION: Primary | ICD-10-CM

## 2020-10-26 DIAGNOSIS — Z87.891 FORMER SMOKER: ICD-10-CM

## 2020-10-26 DIAGNOSIS — E78.2 MIXED HYPERLIPIDEMIA: ICD-10-CM

## 2020-10-26 PROCEDURE — 1160F RVW MEDS BY RX/DR IN RCRD: CPT | Performed by: NURSE PRACTITIONER

## 2020-10-26 PROCEDURE — 99214 OFFICE O/P EST MOD 30 MIN: CPT | Performed by: NURSE PRACTITIONER

## 2020-10-26 PROCEDURE — 3079F DIAST BP 80-89 MM HG: CPT | Performed by: NURSE PRACTITIONER

## 2020-10-26 PROCEDURE — 3077F SYST BP >= 140 MM HG: CPT | Performed by: NURSE PRACTITIONER

## 2020-10-26 RX ORDER — LOSARTAN POTASSIUM 50 MG/1
50 TABLET ORAL DAILY
Qty: 90 TABLET | Refills: 1 | Status: SHIPPED | OUTPATIENT
Start: 2020-10-26 | End: 2020-11-09

## 2020-11-09 DIAGNOSIS — I10 ESSENTIAL HYPERTENSION: ICD-10-CM

## 2020-11-09 RX ORDER — LOSARTAN POTASSIUM 50 MG/1
100 TABLET ORAL DAILY
Qty: 90 TABLET | Refills: 1
Start: 2020-11-09 | End: 2021-02-15

## 2020-11-12 ENCOUNTER — TELEPHONE (OUTPATIENT)
Dept: FAMILY MEDICINE CLINIC | Facility: CLINIC | Age: 75
End: 2020-11-12

## 2020-12-10 ENCOUNTER — TELEPHONE (OUTPATIENT)
Dept: FAMILY MEDICINE CLINIC | Facility: CLINIC | Age: 75
End: 2020-12-10

## 2020-12-12 ENCOUNTER — TELEPHONE (OUTPATIENT)
Dept: FAMILY MEDICINE CLINIC | Facility: CLINIC | Age: 75
End: 2020-12-12

## 2020-12-15 ENCOUNTER — TELEMEDICINE (OUTPATIENT)
Dept: FAMILY MEDICINE CLINIC | Facility: CLINIC | Age: 75
End: 2020-12-15
Payer: COMMERCIAL

## 2020-12-15 VITALS — DIASTOLIC BLOOD PRESSURE: 88 MMHG | SYSTOLIC BLOOD PRESSURE: 158 MMHG

## 2020-12-15 DIAGNOSIS — I10 ESSENTIAL HYPERTENSION: Primary | ICD-10-CM

## 2020-12-15 DIAGNOSIS — J01.90 ACUTE NON-RECURRENT SINUSITIS, UNSPECIFIED LOCATION: ICD-10-CM

## 2020-12-15 PROCEDURE — 99214 OFFICE O/P EST MOD 30 MIN: CPT | Performed by: NURSE PRACTITIONER

## 2020-12-15 PROCEDURE — 3079F DIAST BP 80-89 MM HG: CPT | Performed by: NURSE PRACTITIONER

## 2020-12-15 PROCEDURE — 1160F RVW MEDS BY RX/DR IN RCRD: CPT | Performed by: NURSE PRACTITIONER

## 2020-12-15 PROCEDURE — 3077F SYST BP >= 140 MM HG: CPT | Performed by: NURSE PRACTITIONER

## 2020-12-15 PROCEDURE — 1036F TOBACCO NON-USER: CPT | Performed by: NURSE PRACTITIONER

## 2020-12-15 RX ORDER — AMOXICILLIN 875 MG/1
875 TABLET, COATED ORAL 2 TIMES DAILY
Qty: 14 TABLET | Refills: 0 | Status: SHIPPED | OUTPATIENT
Start: 2020-12-15 | End: 2020-12-22

## 2020-12-15 RX ORDER — AMLODIPINE BESYLATE 5 MG/1
5 TABLET ORAL DAILY
Qty: 30 TABLET | Refills: 5 | Status: SHIPPED | OUTPATIENT
Start: 2020-12-15 | End: 2021-02-15 | Stop reason: SDUPTHER

## 2021-01-08 ENCOUNTER — TELEPHONE (OUTPATIENT)
Dept: FAMILY MEDICINE CLINIC | Facility: CLINIC | Age: 76
End: 2021-01-08

## 2021-01-11 NOTE — TELEPHONE ENCOUNTER
Patient is only taking amlodipine 5 mg, he started to ride a bike 3 times a day for about 5 mins  And Patient states that he feels good  I told him to keep up the good work but wanted to make sure you were ok with this  He stopped taking the losartan cause he was out of the medication  Just wanted to report this to you

## 2021-01-11 NOTE — TELEPHONE ENCOUNTER
Can you please double check the BP readings on the 2nd page is the most recent as he didn't put any dates? If so, BP is starting to look much better, 130s/70s  Continue Losartan 100 mg daily and Amlodipine 5 mg daily  Keep checking BP and drop off log in one month    Call sooner if he notices BP starts trending up again

## 2021-01-29 ENCOUNTER — TELEPHONE (OUTPATIENT)
Dept: FAMILY MEDICINE CLINIC | Facility: CLINIC | Age: 76
End: 2021-01-29

## 2021-01-29 NOTE — TELEPHONE ENCOUNTER
----- Message from Efren Walsh, [address] sent at 1/29/2021 12:39 PM EST -----  Regarding: bp log  Please call pt    Colby's BP log looks pretty good overall  SBP running 120-130s/ 65-70s  Occasional reading elevated in the 140s/80s  He can continue with the Amlodipine 5 mg daily  Keep checking BP daily  I'm okay with him dropping off his BP log in a month or so    If he starts seeing his readings trend upward (more in the 140s/90s), I would like to know about it sooner rather than later

## 2021-01-29 NOTE — TELEPHONE ENCOUNTER
Spoke with patient and explained this to him   He understands and will call if he needs us, if not he will drop off his PB log end of February

## 2021-02-15 ENCOUNTER — OFFICE VISIT (OUTPATIENT)
Dept: FAMILY MEDICINE CLINIC | Facility: CLINIC | Age: 76
End: 2021-02-15
Payer: COMMERCIAL

## 2021-02-15 VITALS
BODY MASS INDEX: 33.49 KG/M2 | RESPIRATION RATE: 16 BRPM | TEMPERATURE: 98.9 F | HEIGHT: 68 IN | DIASTOLIC BLOOD PRESSURE: 88 MMHG | SYSTOLIC BLOOD PRESSURE: 138 MMHG | WEIGHT: 221 LBS | HEART RATE: 92 BPM

## 2021-02-15 DIAGNOSIS — R09.82 PND (POST-NASAL DRIP): ICD-10-CM

## 2021-02-15 DIAGNOSIS — Z00.00 MEDICARE ANNUAL WELLNESS VISIT, SUBSEQUENT: Primary | ICD-10-CM

## 2021-02-15 DIAGNOSIS — E78.2 MIXED HYPERLIPIDEMIA: ICD-10-CM

## 2021-02-15 DIAGNOSIS — I10 ESSENTIAL HYPERTENSION: ICD-10-CM

## 2021-02-15 PROCEDURE — 1160F RVW MEDS BY RX/DR IN RCRD: CPT | Performed by: NURSE PRACTITIONER

## 2021-02-15 PROCEDURE — 3079F DIAST BP 80-89 MM HG: CPT | Performed by: NURSE PRACTITIONER

## 2021-02-15 PROCEDURE — 3288F FALL RISK ASSESSMENT DOCD: CPT | Performed by: NURSE PRACTITIONER

## 2021-02-15 PROCEDURE — 99214 OFFICE O/P EST MOD 30 MIN: CPT | Performed by: NURSE PRACTITIONER

## 2021-02-15 PROCEDURE — 1170F FXNL STATUS ASSESSED: CPT | Performed by: NURSE PRACTITIONER

## 2021-02-15 PROCEDURE — G0439 PPPS, SUBSEQ VISIT: HCPCS | Performed by: NURSE PRACTITIONER

## 2021-02-15 PROCEDURE — 3075F SYST BP GE 130 - 139MM HG: CPT | Performed by: NURSE PRACTITIONER

## 2021-02-15 PROCEDURE — 1125F AMNT PAIN NOTED PAIN PRSNT: CPT | Performed by: NURSE PRACTITIONER

## 2021-02-15 PROCEDURE — 3725F SCREEN DEPRESSION PERFORMED: CPT | Performed by: NURSE PRACTITIONER

## 2021-02-15 PROCEDURE — 1036F TOBACCO NON-USER: CPT | Performed by: NURSE PRACTITIONER

## 2021-02-15 RX ORDER — FLUTICASONE PROPIONATE 50 MCG
2 SPRAY, SUSPENSION (ML) NASAL DAILY
Qty: 1 BOTTLE | Refills: 5 | Status: SHIPPED | OUTPATIENT
Start: 2021-02-15

## 2021-02-15 RX ORDER — AMLODIPINE BESYLATE 5 MG/1
5 TABLET ORAL DAILY
Qty: 30 TABLET | Refills: 5 | Status: SHIPPED | OUTPATIENT
Start: 2021-02-15 | End: 2021-08-16 | Stop reason: SDUPTHER

## 2021-02-15 NOTE — PROGRESS NOTES
Assessment and Plan:     Problem List Items Addressed This Visit        Cardiovascular and Mediastinum    Essential hypertension     BP is stable on Amlodipine 5 mg daily   I asked him to continue checking his BP daily and call if readings are consistently >140/90  Follow a low sodium diet  Lab work as ordered          Relevant Medications    amLODIPine (NORVASC) 5 mg tablet    Other Relevant Orders    CBC and differential    Comprehensive metabolic panel    Lipid panel    TSH, 3rd generation with Free T4 reflex       Other    Mixed hyperlipidemia     No current statin therapy   Needs updated lipid panel which was ordered today   He'll likely need to restart a statin which we did discuss today          Relevant Orders    CBC and differential    Comprehensive metabolic panel    Lipid panel    TSH, 3rd generation with Free T4 reflex      Other Visit Diagnoses     Medicare annual wellness visit, subsequent    -  Primary    Encounter for immunization        PND (post-nasal drip)        Flonase 2 sprays/ nostril once daily  Humidifier in bedroom at night     Relevant Medications    fluticasone (FLONASE) 50 mcg/act nasal spray        BMI Counseling: Body mass index is 33 6 kg/m²  The BMI is above normal  Nutrition recommendations include encouraging healthy choices of fruits and vegetables, moderation in carbohydrate intake and increasing intake of lean protein  Exercise recommendations include moderate physical activity 150 minutes/week  Preventive health issues were discussed with patient, and age appropriate screening tests were ordered as noted in patient's After Visit Summary  Personalized health advice and appropriate referrals for health education or preventive services given if needed, as noted in patient's After Visit Summary       History of Present Illness:     Patient presents for Medicare Annual Wellness visit    Patient Care Team:  YAQUELIN Morse as PCP - General (Family Medicine) Problem List:     Patient Active Problem List   Diagnosis    Essential hypertension    BMI 34 0-34 9,adult    Former smoker    Mixed hyperlipidemia    Arthritis      Past Medical and Surgical History:     Past Medical History:   Diagnosis Date    Back pain      History reviewed  No pertinent surgical history     Family History:     Family History   Problem Relation Age of Onset    Cancer Mother     Heart attack Father 48      Social History:        Social History     Socioeconomic History    Marital status: /Civil Union     Spouse name: None    Number of children: None    Years of education: None    Highest education level: None   Occupational History    None   Social Needs    Financial resource strain: Not hard at all   Rose-Kj insecurity     Worry: Never true     Inability: Never true    Transportation needs     Medical: No     Non-medical: No   Tobacco Use    Smoking status: Former Smoker     Packs/day: 1      Types: Cigarettes     Start date: 11/10/1956     Quit date: 12/3/2019     Years since quittin 2    Smokeless tobacco: Never Used   Substance and Sexual Activity    Alcohol use: Yes     Frequency: Monthly or less     Binge frequency: Never    Drug use: Never    Sexual activity: Yes     Partners: Female   Lifestyle    Physical activity     Days per week: 1 day     Minutes per session: 30 min    Stress: Not at all   Relationships    Social connections     Talks on phone: More than three times a week     Gets together: More than three times a week     Attends Sikhism service: More than 4 times per year     Active member of club or organization: Yes     Attends meetings of clubs or organizations: More than 4 times per year     Relationship status:     Intimate partner violence     Fear of current or ex partner: No     Emotionally abused: No     Physically abused: No     Forced sexual activity: No   Other Topics Concern    None   Social History Narrative    None Medications and Allergies:     Current Outpatient Medications   Medication Sig Dispense Refill    amLODIPine (NORVASC) 5 mg tablet Take 1 tablet (5 mg total) by mouth daily 30 tablet 5    aspirin (ECOTRIN LOW STRENGTH) 81 mg EC tablet Take 81 mg by mouth daily      atorvastatin (LIPITOR) 10 mg tablet Take 1 tablet (10 mg total) by mouth daily 30 tablet 5    Omega-3 Fatty Acids (FISH OIL) 1,000 mg Take 1,000 mg by mouth daily      fluticasone (FLONASE) 50 mcg/act nasal spray 2 sprays into each nostril daily 1 Bottle 5     No current facility-administered medications for this visit  Allergies   Allergen Reactions    Lisinopril Cough      Immunizations:     Immunization History   Administered Date(s) Administered    Pneumococcal Conjugate 13-Valent 12/16/2019      Health Maintenance: There are no preventive care reminders to display for this patient  Topic Date Due    DTaP,Tdap,and Td Vaccines (1 - Tdap) 11/10/1965    Influenza Vaccine (1) 09/01/2020    Pneumococcal Vaccine: 65+ Years (2 of 2 - PPSV23) 12/16/2020      Medicare Health Risk Assessment:     /88   Pulse 92   Temp 98 9 °F (37 2 °C) (Tympanic)   Resp 16   Ht 5' 8" (1 727 m)   Wt 100 kg (221 lb)   BMI 33 60 kg/m²      Devang Hodgkins is here for his Subsequent Wellness visit  Health Risk Assessment:   Patient rates overall health as very good  Patient feels that their physical health rating is same  Eyesight was rated as same  Hearing was rated as same  Patient feels that their emotional and mental health rating is same  Pain experienced in the last 7 days has been some  Patient's pain rating has been 3/10  All over pain/ arthritis     Depression Screening:   PHQ-2 Score: 0      Fall Risk Screening: In the past year, patient has experienced: no history of falling in past year      Home Safety:  Patient does not have trouble with stairs inside or outside of their home   Patient has working smoke alarms and has working carbon monoxide detector  Home safety hazards include: none  Nutrition:   Current diet is Regular  Medications:   Patient is not currently taking any over-the-counter supplements  Patient is able to manage medications  Activities of Daily Living (ADLs)/Instrumental Activities of Daily Living (IADLs):   Walk and transfer into and out of bed and chair?: Yes  Dress and groom yourself?: Yes    Bathe or shower yourself?: Yes    Do your laundry/housekeeping?: Yes  Manage your money, pay your bills and track your expenses?: Yes  Make your own meals?: Yes    Do your own shopping?: Yes    Previous Hospitalizations:   Any hospitalizations or ED visits within the last 12 months?: No      Advance Care Planning:   Living will: Yes    Durable POA for healthcare:  Yes    Advanced directive: Yes    Five wishes given: Yes      Cognitive Screening:   Provider or family/friend/caregiver concerned regarding cognition?: No    PREVENTIVE SCREENINGS      Cardiovascular Screening:    General: History Lipid Disorder and Risks and Benefits Discussed    Due for: Lipid Panel      Diabetes Screening:     General: Risks and Benefits Discussed    Due for: Blood Glucose      Colorectal Cancer Screening:     General: Risks and Benefits Discussed and Patient Declines      Prostate Cancer Screening:    General: Screening Not Indicated      Osteoporosis Screening:    General: Risks and Benefits Discussed and Patient Declines      Abdominal Aortic Aneurysm (AAA) Screening:    Risk factors include: tobacco use        General: Screening Not Indicated      Lung Cancer Screening:     General: Risks and Benefits Discussed and Patient Declines      Hepatitis C Screening:    General: Screening Not Indicated      YAQUELIN Souza

## 2021-02-15 NOTE — PATIENT INSTRUCTIONS

## 2021-02-15 NOTE — ASSESSMENT & PLAN NOTE
No current statin therapy   Needs updated lipid panel which was ordered today   He'll likely need to restart a statin which we did discuss today

## 2021-02-15 NOTE — ASSESSMENT & PLAN NOTE
BP is stable on Amlodipine 5 mg daily   I asked him to continue checking his BP daily and call if readings are consistently >140/90  Follow a low sodium diet  Lab work as ordered

## 2021-02-15 NOTE — PROGRESS NOTES
Assessment/Plan:    Essential hypertension  BP is stable on Amlodipine 5 mg daily   I asked him to continue checking his BP daily and call if readings are consistently >140/90  Follow a low sodium diet  Lab work as ordered     Mixed hyperlipidemia  No current statin therapy   Needs updated lipid panel which was ordered today   He'll likely need to restart a statin which we did discuss today     Declined any CRC screening options (including a FIT test, cologuard and colonoscopy)     Due for Pneumovax but he would like to schedule the COVID vaccine  Two weeks following COVID series, he can schedule a nurse visit with us for the Pneumovax      Diagnoses and all orders for this visit:    Medicare annual wellness visit, subsequent    Essential hypertension  -     CBC and differential; Future  -     Comprehensive metabolic panel; Future  -     Lipid panel; Future  -     TSH, 3rd generation with Free T4 reflex; Future  -     amLODIPine (NORVASC) 5 mg tablet; Take 1 tablet (5 mg total) by mouth daily    Mixed hyperlipidemia  -     CBC and differential; Future  -     Comprehensive metabolic panel; Future  -     Lipid panel; Future  -     TSH, 3rd generation with Free T4 reflex; Future    Encounter for immunization    PND (post-nasal drip)  Comments:  Flonase 2 sprays/ nostril once daily  Humidifier in bedroom at night   Orders:  -     fluticasone (FLONASE) 50 mcg/act nasal spray; 2 sprays into each nostril daily    Other orders  -     Cancel: PNEUMOCOCCAL POLYSACCHARIDE VACCINE 23-VALENT =>1YO SQ IM  -     Cancel: influenza vaccine, quadrivalent, 0 5 mL, preservative-free, for adult and pediatric patients 6 mos+ (AFLURIA, FLUARIX, FLULAVAL, FLUZONE)          Subjective:      Patient ID: July Hill is a 67 y o  male      HPI     Pt presents by himself today for an AWV and routine follow up  Doing well overall, he has a fishing trip planned for April with his family which he is looking forward to   Has lots of shannan, great grandson turned 1 in January   He would like to schedule the COVID vaccine     HTN- Currently taking Amlodipine 5 mg daily  He stopped the Losartan months ago and his BP seems to be tolerating this, running 120s-130s/68-70s at home  He has started riding a stationary bike which he feels has helped  Denies chest pain, palpitations, edema, SOB, dizziness, headaches      HLD- reports he has been off of Lipitor for approximately 2 years now as his cholesterol was "good"  Lipid panel from 12/2019 / / HDL 26/   ASCVD risk at 46 7%     Former smoker- he originally quit around 11/2019, started smoking again in August for about 2 5 months but quit again  60 ppd years, no interest in doing the CT lung screening  He does feel better after quitting, less coughing      He has been trying to exercise more, now takes his dog for a walk daily which he enjoys and riding his stationary bike     Notes some PND, worse in the Winter months, comes and goes  Wakes up with a mild sore throat someday's but gets better after an hour or so     The following portions of the patient's history were reviewed and updated as appropriate: allergies, current medications, past family history, past medical history, past social history, past surgical history and problem list     Review of Systems   Constitutional: Negative for chills, fatigue, fever and unexpected weight change  HENT: Negative for congestion, ear pain, hearing loss, postnasal drip, rhinorrhea, sinus pressure and sore throat  Respiratory: Negative for cough, shortness of breath and wheezing  Cardiovascular: Negative for chest pain, palpitations and leg swelling  Gastrointestinal: Negative for abdominal pain, blood in stool, constipation, diarrhea, nausea and vomiting  Musculoskeletal: Negative for arthralgias and myalgias  Skin: Negative for rash and wound  Neurological: Negative for dizziness, weakness, numbness and headaches  Psychiatric/Behavioral: Negative for sleep disturbance and suicidal ideas  Objective:      /88   Pulse 92   Temp 98 9 °F (37 2 °C) (Tympanic)   Resp 16   Ht 5' 8" (1 727 m)   Wt 100 kg (221 lb)   BMI 33 60 kg/m²          Physical Exam  Constitutional:       General: He is not in acute distress  Appearance: He is well-developed  He is obese  He is not ill-appearing, toxic-appearing or diaphoretic  HENT:      Head: Normocephalic and atraumatic  Eyes:      Conjunctiva/sclera: Conjunctivae normal       Pupils: Pupils are equal, round, and reactive to light  Neck:      Musculoskeletal: Normal range of motion and neck supple  No neck rigidity or muscular tenderness  Thyroid: No thyromegaly  Vascular: No carotid bruit  Cardiovascular:      Rate and Rhythm: Normal rate and regular rhythm  Heart sounds: Normal heart sounds  No murmur  Pulmonary:      Effort: Pulmonary effort is normal  No respiratory distress  Breath sounds: Normal breath sounds  No wheezing  Abdominal:      General: Bowel sounds are normal  There is no distension  Palpations: Abdomen is soft  Tenderness: There is no abdominal tenderness  Musculoskeletal: Normal range of motion  Lymphadenopathy:      Cervical: No cervical adenopathy  Skin:     General: Skin is warm and dry  Neurological:      General: No focal deficit present  Mental Status: He is alert and oriented to person, place, and time  Psychiatric:         Mood and Affect: Mood normal          Behavior: Behavior normal          Thought Content:  Thought content normal          Judgment: Judgment normal

## 2021-02-17 ENCOUNTER — IMMUNIZATIONS (OUTPATIENT)
Dept: FAMILY MEDICINE CLINIC | Facility: HOSPITAL | Age: 76
End: 2021-02-17

## 2021-02-17 DIAGNOSIS — Z23 ENCOUNTER FOR IMMUNIZATION: Primary | ICD-10-CM

## 2021-02-17 PROCEDURE — 0001A SARS-COV-2 / COVID-19 MRNA VACCINE (PFIZER-BIONTECH) 30 MCG: CPT

## 2021-02-17 PROCEDURE — 91300 SARS-COV-2 / COVID-19 MRNA VACCINE (PFIZER-BIONTECH) 30 MCG: CPT

## 2021-03-03 LAB
ALBUMIN SERPL-MCNC: 4.2 G/DL (ref 3.7–4.7)
ALBUMIN/GLOB SERPL: 1.2 {RATIO} (ref 1.2–2.2)
ALP SERPL-CCNC: 145 IU/L (ref 39–117)
ALT SERPL-CCNC: 20 IU/L (ref 0–44)
AST SERPL-CCNC: 20 IU/L (ref 0–40)
BASOPHILS # BLD AUTO: 0.2 X10E3/UL (ref 0–0.2)
BASOPHILS NFR BLD AUTO: 1 %
BILIRUB SERPL-MCNC: 0.2 MG/DL (ref 0–1.2)
BUN SERPL-MCNC: 17 MG/DL (ref 8–27)
BUN/CREAT SERPL: 18 (ref 10–24)
CALCIUM SERPL-MCNC: 9.1 MG/DL (ref 8.6–10.2)
CHLORIDE SERPL-SCNC: 105 MMOL/L (ref 96–106)
CHOLEST SERPL-MCNC: 192 MG/DL (ref 100–199)
CO2 SERPL-SCNC: 24 MMOL/L (ref 20–29)
CREAT SERPL-MCNC: 0.96 MG/DL (ref 0.76–1.27)
EOSINOPHIL # BLD AUTO: 0.6 X10E3/UL (ref 0–0.4)
EOSINOPHIL NFR BLD AUTO: 6 %
ERYTHROCYTE [DISTWIDTH] IN BLOOD BY AUTOMATED COUNT: 13 % (ref 11.6–15.4)
GLOBULIN SER-MCNC: 3.4 G/DL (ref 1.5–4.5)
GLUCOSE SERPL-MCNC: 95 MG/DL (ref 65–99)
HCT VFR BLD AUTO: 42 % (ref 37.5–51)
HDLC SERPL-MCNC: 28 MG/DL
HGB BLD-MCNC: 14.2 G/DL (ref 13–17.7)
IMM GRANULOCYTES # BLD: 0.1 X10E3/UL (ref 0–0.1)
IMM GRANULOCYTES NFR BLD: 1 %
LDLC SERPL CALC-MCNC: 124 MG/DL (ref 0–99)
LYMPHOCYTES # BLD AUTO: 2.3 X10E3/UL (ref 0.7–3.1)
LYMPHOCYTES NFR BLD AUTO: 22 %
MCH RBC QN AUTO: 31.6 PG (ref 26.6–33)
MCHC RBC AUTO-ENTMCNC: 33.8 G/DL (ref 31.5–35.7)
MCV RBC AUTO: 94 FL (ref 79–97)
MONOCYTES # BLD AUTO: 0.9 X10E3/UL (ref 0.1–0.9)
MONOCYTES NFR BLD AUTO: 9 %
NEUTROPHILS # BLD AUTO: 6.6 X10E3/UL (ref 1.4–7)
NEUTROPHILS NFR BLD AUTO: 61 %
PLATELET # BLD AUTO: 382 X10E3/UL (ref 150–450)
POTASSIUM SERPL-SCNC: 4.1 MMOL/L (ref 3.5–5.2)
PROT SERPL-MCNC: 7.6 G/DL (ref 6–8.5)
RBC # BLD AUTO: 4.49 X10E6/UL (ref 4.14–5.8)
SL AMB EGFR AFRICAN AMERICAN: 88 ML/MIN/1.73
SL AMB EGFR NON AFRICAN AMERICAN: 76 ML/MIN/1.73
SL AMB VLDL CHOLESTEROL CALC: 40 MG/DL (ref 5–40)
SODIUM SERPL-SCNC: 141 MMOL/L (ref 134–144)
TRIGL SERPL-MCNC: 223 MG/DL (ref 0–149)
TSH SERPL DL<=0.005 MIU/L-ACNC: 1.51 UIU/ML (ref 0.45–4.5)
WBC # BLD AUTO: 10.6 X10E3/UL (ref 3.4–10.8)

## 2021-03-08 ENCOUNTER — IMMUNIZATIONS (OUTPATIENT)
Dept: FAMILY MEDICINE CLINIC | Facility: HOSPITAL | Age: 76
End: 2021-03-08

## 2021-03-08 DIAGNOSIS — Z23 ENCOUNTER FOR IMMUNIZATION: Primary | ICD-10-CM

## 2021-03-08 PROCEDURE — 91300 SARS-COV-2 / COVID-19 MRNA VACCINE (PFIZER-BIONTECH) 30 MCG: CPT

## 2021-03-08 PROCEDURE — 0002A SARS-COV-2 / COVID-19 MRNA VACCINE (PFIZER-BIONTECH) 30 MCG: CPT

## 2021-08-05 ENCOUNTER — RA CDI HCC (OUTPATIENT)
Dept: OTHER | Facility: HOSPITAL | Age: 76
End: 2021-08-05

## 2021-08-05 NOTE — PROGRESS NOTES
[facility]  coding opportunities          Chart reviewed, no opportunity found: CHART REVIEWED, NO OPPORTUNITY FOUND                     Patients insurance company: [employer] only)

## 2021-08-16 ENCOUNTER — TELEMEDICINE (OUTPATIENT)
Dept: FAMILY MEDICINE CLINIC | Facility: CLINIC | Age: 76
End: 2021-08-16
Payer: COMMERCIAL

## 2021-08-16 VITALS — SYSTOLIC BLOOD PRESSURE: 117 MMHG | WEIGHT: 204.5 LBS | DIASTOLIC BLOOD PRESSURE: 72 MMHG | BODY MASS INDEX: 31.09 KG/M2

## 2021-08-16 DIAGNOSIS — E66.9 OBESITY (BMI 30.0-34.9): ICD-10-CM

## 2021-08-16 DIAGNOSIS — I10 ESSENTIAL HYPERTENSION: Primary | ICD-10-CM

## 2021-08-16 DIAGNOSIS — E78.2 MIXED HYPERLIPIDEMIA: ICD-10-CM

## 2021-08-16 PROCEDURE — 99214 OFFICE O/P EST MOD 30 MIN: CPT | Performed by: NURSE PRACTITIONER

## 2021-08-16 PROCEDURE — 3074F SYST BP LT 130 MM HG: CPT | Performed by: NURSE PRACTITIONER

## 2021-08-16 PROCEDURE — 1036F TOBACCO NON-USER: CPT | Performed by: NURSE PRACTITIONER

## 2021-08-16 PROCEDURE — 3078F DIAST BP <80 MM HG: CPT | Performed by: NURSE PRACTITIONER

## 2021-08-16 PROCEDURE — 1160F RVW MEDS BY RX/DR IN RCRD: CPT | Performed by: NURSE PRACTITIONER

## 2021-08-16 RX ORDER — AMLODIPINE BESYLATE 5 MG/1
5 TABLET ORAL DAILY
Qty: 90 TABLET | Refills: 1 | Status: SHIPPED | OUTPATIENT
Start: 2021-08-16 | End: 2022-03-01 | Stop reason: SDUPTHER

## 2021-08-16 NOTE — PROGRESS NOTES
Virtual Regular Visit    Verification of patient location:    Patient is located in the following state in which I hold an active license [place]      Assessment/Plan:    Pt is due for Pneumovax and he can schedule a nurse visit for this as our visit today was virtual     Problem List Items Addressed This Visit        Cardiovascular and Mediastinum    Essential hypertension - Primary    Relevant Medications    amLODIPine (NORVASC) 5 mg tablet    Other Relevant Orders    CBC and differential    Comprehensive metabolic panel    TSH, 3rd generation with Free T4 reflex       Other    BMI 34 0-34 9,adult    Relevant Orders    CBC and differential    Comprehensive metabolic panel    TSH, 3rd generation with Free T4 reflex    Mixed hyperlipidemia    Relevant Orders    CBC and differential    Comprehensive metabolic panel    Lipid panel    TSH, 3rd generation with Free T4 reflex               Reason for visit is   Chief Complaint   Patient presents with    Follow-up     6 months and review labs   Virtual Regular Visit      Health Maintenance   Topic Date Due    Hepatitis C Screening  Never done    DTaP,Tdap,and Td Vaccines (1 - Tdap) Never done    Pneumococcal Vaccine: 65+ Years (2 of 2 - PPSV23) 12/16/2020    Influenza Vaccine (1) 09/01/2021    Depression Screening PHQ  02/15/2022    BMI: Followup Plan  02/15/2022    BMI: Adult  02/15/2022    Fall Risk  02/15/2022    Medicare Annual Wellness Visit (AWV)  02/15/2022    COVID-19 Vaccine  Completed    HIB Vaccine  Aged Out    Hepatitis B Vaccine  Aged Out    IPV Vaccine  Aged Out    Hepatitis A Vaccine  Aged Out    Meningococcal ACWY Vaccine  Aged Out    HPV Vaccine  Aged Out     Encounter provider YAQUELIN Reagan    Provider located at [address] [place] [id number]      Recent Visits  No visits were found meeting these conditions    Showing recent visits within past 7 days and meeting all other requirements  Today's Visits  Date Type Provider Dept   08/16/21 Telemedicine Sade Pingree, [address] today's visits and meeting all other requirements  Future Appointments  No visits were found meeting these conditions  Showing future appointments within next 150 days and meeting all other requirements       The patient was identified by name and date of birth  Hemanth Conti was informed that this is a telemedicine visit and that the visit is being conducted through [address] [employer] and patient was informed that this is a secure, HIPAA-compliant platform  He agrees to proceed     My office door was closed  No one else was in the room  He acknowledged consent and understanding of privacy and security of the video platform  The patient has agreed to participate and understands they can discontinue the visit at any time  Patient is aware this is a billable service  Subjective  Hemanth Conti is a 67 y o  male   HPI     Pt presents virtually today for a 6 month follow up   Reports he is doing well overall, no acute concerns today     TN- Currently taking Amlodipine 5 mg daily  He stopped the Losartan >6 months ago and his BP seems to be tolerating this, , average BP over the past 4 months of 127/70  He has lost approximately 13 pounds- rides a stationary bike and walks his dog often  Denies chest pain, palpitations, edema, SOB, dizziness, headaches      HLD- reports he has been off of Lipitor for approximately 2 years now as his cholesterol was "good"  Lipid panel from 12/2019 / / HDL 26/ --> lipid panel 3/1/21 with / / HDL 40/   ASCVD risk at 38 7%    He has no interest in restarting a statin medication as he "didn't feel well on this"  He is taking ASA 81 mg and Krill oil daily      Former smoker- he originally quit around 11/2019, started smoking again in August for about 2 5 months but quit again   60 ppd years, no interest in doing the CT lung screening  Barbara Banerjee does feel better after quitting, less coughing          Past Medical History:   Diagnosis Date    Back pain        No past surgical history on file  Current Outpatient Medications   Medication Sig Dispense Refill    amLODIPine (NORVASC) 5 mg tablet Take 1 tablet (5 mg total) by mouth daily 90 tablet 1    aspirin (ECOTRIN LOW STRENGTH) 81 mg EC tablet Take 81 mg by mouth daily      atorvastatin (LIPITOR) 10 mg tablet Take 1 tablet (10 mg total) by mouth daily 30 tablet 5    fluticasone (FLONASE) 50 mcg/act nasal spray 2 sprays into each nostril daily 1 Bottle 5    Omega-3 Fatty Acids (FISH OIL) 1,000 mg Take 1,000 mg by mouth daily       No current facility-administered medications for this visit  Allergies   Allergen Reactions    Lisinopril Cough       Review of Systems   Constitutional: Negative for chills and fever  HENT: Negative for ear pain and sore throat  Eyes: Negative for pain and visual disturbance  Respiratory: Negative for cough and shortness of breath  Cardiovascular: Negative for chest pain and palpitations  Gastrointestinal: Negative for abdominal pain and vomiting  Genitourinary: Negative for dysuria and hematuria  Musculoskeletal: Positive for arthralgias  Negative for back pain  Skin: Negative for color change and rash  Neurological: Negative for seizures and syncope  Hematological: Negative for adenopathy  Does not bruise/bleed easily  Psychiatric/Behavioral: Negative for dysphoric mood, self-injury, sleep disturbance and suicidal ideas  The patient is not nervous/anxious  All other systems reviewed and are negative  Video Exam    Vitals:    08/16/21 1310   BP: 117/72   Weight: 92 8 kg (204 lb 8 oz)       Physical Exam  Constitutional:       General: He is not in acute distress  Appearance: He is well-developed  He is not diaphoretic  HENT:      Head: Normocephalic and atraumatic  Pulmonary:      Effort: Pulmonary effort is normal    Musculoskeletal:         General: Normal range of motion  Cervical back: Normal range of motion and neck supple  Skin:     Coloration: Skin is not pale  Findings: No rash  Neurological:      Mental Status: He is alert and oriented to person, place, and time  Psychiatric:         Behavior: Behavior normal          Thought Content: Thought content normal          Judgment: Judgment normal           I spent 20 minutes directly with the patient during this visit    VIRTUAL VISIT [facility] verbally agrees to participate in [employer]  Pt is aware that [employer] could be limited without vital signs or the ability to perform a full hands-on physical exam  Raymond E Carlota Duverney understands he or the provider may request at any time to terminate the video visit and request the patient to seek care or treatment in person

## 2021-08-16 NOTE — ASSESSMENT & PLAN NOTE
ASCVD risk at 38 7% which we discussed today  I advised him to restart a statin therapy but he declined this option   He is aware of the risks of this   Continue with dietary modifications and weight loss  Continue ASA 81 mg and Krill oil daily

## 2021-08-16 NOTE — ASSESSMENT & PLAN NOTE
BP seems to be well controlled via his home reads with an average of 127/70  Continue Amlodipine 5 mg daily   Follow a low sodium diet  Labs ordered today

## 2021-08-16 NOTE — ASSESSMENT & PLAN NOTE
Down approximately 13 lb in 6 months  Continue with weight loss via dietary modifications and exercise

## 2021-09-07 LAB
ALBUMIN SERPL-MCNC: 4.1 G/DL (ref 3.7–4.7)
ALBUMIN/GLOB SERPL: 1.3 {RATIO} (ref 1.2–2.2)
ALP SERPL-CCNC: 142 IU/L (ref 48–121)
ALT SERPL-CCNC: 22 IU/L (ref 0–44)
AST SERPL-CCNC: 17 IU/L (ref 0–40)
BASOPHILS # BLD AUTO: 0.1 X10E3/UL (ref 0–0.2)
BASOPHILS NFR BLD AUTO: 1 %
BILIRUB SERPL-MCNC: 0.3 MG/DL (ref 0–1.2)
BUN SERPL-MCNC: 16 MG/DL (ref 8–27)
BUN/CREAT SERPL: 18 (ref 10–24)
CALCIUM SERPL-MCNC: 8.8 MG/DL (ref 8.6–10.2)
CHLORIDE SERPL-SCNC: 105 MMOL/L (ref 96–106)
CHOLEST SERPL-MCNC: 176 MG/DL (ref 100–199)
CO2 SERPL-SCNC: 22 MMOL/L (ref 20–29)
CREAT SERPL-MCNC: 0.9 MG/DL (ref 0.76–1.27)
EOSINOPHIL # BLD AUTO: 0.4 X10E3/UL (ref 0–0.4)
EOSINOPHIL NFR BLD AUTO: 4 %
ERYTHROCYTE [DISTWIDTH] IN BLOOD BY AUTOMATED COUNT: 13.1 % (ref 11.6–15.4)
GLOBULIN SER-MCNC: 3.2 G/DL (ref 1.5–4.5)
GLUCOSE SERPL-MCNC: 100 MG/DL (ref 65–99)
HCT VFR BLD AUTO: 39.5 % (ref 37.5–51)
HDLC SERPL-MCNC: 27 MG/DL
HGB BLD-MCNC: 13.4 G/DL (ref 13–17.7)
IMM GRANULOCYTES # BLD: 0 X10E3/UL (ref 0–0.1)
IMM GRANULOCYTES NFR BLD: 0 %
LDLC SERPL CALC-MCNC: 119 MG/DL (ref 0–99)
LYMPHOCYTES # BLD AUTO: 1.8 X10E3/UL (ref 0.7–3.1)
LYMPHOCYTES NFR BLD AUTO: 19 %
MCH RBC QN AUTO: 30.9 PG (ref 26.6–33)
MCHC RBC AUTO-ENTMCNC: 33.9 G/DL (ref 31.5–35.7)
MCV RBC AUTO: 91 FL (ref 79–97)
MONOCYTES # BLD AUTO: 0.9 X10E3/UL (ref 0.1–0.9)
MONOCYTES NFR BLD AUTO: 9 %
NEUTROPHILS # BLD AUTO: 6.1 X10E3/UL (ref 1.4–7)
NEUTROPHILS NFR BLD AUTO: 67 %
PLATELET # BLD AUTO: 357 X10E3/UL (ref 150–450)
POTASSIUM SERPL-SCNC: 4.1 MMOL/L (ref 3.5–5.2)
PROT SERPL-MCNC: 7.3 G/DL (ref 6–8.5)
RBC # BLD AUTO: 4.34 X10E6/UL (ref 4.14–5.8)
SL AMB EGFR AFRICAN AMERICAN: 96 ML/MIN/1.73
SL AMB EGFR NON AFRICAN AMERICAN: 83 ML/MIN/1.73
SL AMB VLDL CHOLESTEROL CALC: 30 MG/DL (ref 5–40)
SODIUM SERPL-SCNC: 141 MMOL/L (ref 134–144)
TRIGL SERPL-MCNC: 168 MG/DL (ref 0–149)
TSH SERPL DL<=0.005 MIU/L-ACNC: 1.28 UIU/ML (ref 0.45–4.5)
WBC # BLD AUTO: 9.3 X10E3/UL (ref 3.4–10.8)

## 2022-02-10 ENCOUNTER — RA CDI HCC (OUTPATIENT)
Dept: OTHER | Facility: HOSPITAL | Age: 77
End: 2022-02-10

## 2022-02-10 NOTE — PROGRESS NOTES
[facility]  coding opportunities       Chart reviewed, no opportunity found: CHART REVIEWED, NO OPPORTUNITY FOUND                        Patients insurance company: [employer] only)

## 2022-02-24 ENCOUNTER — OFFICE VISIT (OUTPATIENT)
Dept: FAMILY MEDICINE CLINIC | Facility: CLINIC | Age: 77
End: 2022-02-24
Payer: COMMERCIAL

## 2022-02-24 VITALS
TEMPERATURE: 98.4 F | SYSTOLIC BLOOD PRESSURE: 152 MMHG | HEART RATE: 60 BPM | BODY MASS INDEX: 33.49 KG/M2 | WEIGHT: 221 LBS | HEIGHT: 68 IN | RESPIRATION RATE: 16 BRPM | DIASTOLIC BLOOD PRESSURE: 84 MMHG

## 2022-02-24 DIAGNOSIS — Z23 ENCOUNTER FOR IMMUNIZATION: ICD-10-CM

## 2022-02-24 DIAGNOSIS — Z00.00 MEDICARE ANNUAL WELLNESS VISIT, SUBSEQUENT: Primary | ICD-10-CM

## 2022-02-24 DIAGNOSIS — Z11.59 NEED FOR HEPATITIS C SCREENING TEST: ICD-10-CM

## 2022-02-24 DIAGNOSIS — Z87.891 FORMER SMOKER: ICD-10-CM

## 2022-02-24 DIAGNOSIS — E66.9 OBESITY (BMI 30.0-34.9): ICD-10-CM

## 2022-02-24 DIAGNOSIS — E78.2 MIXED HYPERLIPIDEMIA: ICD-10-CM

## 2022-02-24 DIAGNOSIS — I10 ESSENTIAL HYPERTENSION: ICD-10-CM

## 2022-02-24 DIAGNOSIS — R73.01 IFG (IMPAIRED FASTING GLUCOSE): ICD-10-CM

## 2022-02-24 PROCEDURE — 3288F FALL RISK ASSESSMENT DOCD: CPT | Performed by: NURSE PRACTITIONER

## 2022-02-24 PROCEDURE — 4040F PNEUMOC VAC/ADMIN/RCVD: CPT | Performed by: NURSE PRACTITIONER

## 2022-02-24 PROCEDURE — 3725F SCREEN DEPRESSION PERFORMED: CPT | Performed by: NURSE PRACTITIONER

## 2022-02-24 PROCEDURE — G0439 PPPS, SUBSEQ VISIT: HCPCS | Performed by: NURSE PRACTITIONER

## 2022-02-24 PROCEDURE — 99214 OFFICE O/P EST MOD 30 MIN: CPT | Performed by: NURSE PRACTITIONER

## 2022-02-24 PROCEDURE — 90732 PPSV23 VACC 2 YRS+ SUBQ/IM: CPT

## 2022-02-24 PROCEDURE — 1125F AMNT PAIN NOTED PAIN PRSNT: CPT | Performed by: NURSE PRACTITIONER

## 2022-02-24 PROCEDURE — 1160F RVW MEDS BY RX/DR IN RCRD: CPT | Performed by: NURSE PRACTITIONER

## 2022-02-24 PROCEDURE — G0009 ADMIN PNEUMOCOCCAL VACCINE: HCPCS

## 2022-02-24 PROCEDURE — 3077F SYST BP >= 140 MM HG: CPT | Performed by: NURSE PRACTITIONER

## 2022-02-24 PROCEDURE — 1170F FXNL STATUS ASSESSED: CPT | Performed by: NURSE PRACTITIONER

## 2022-02-24 PROCEDURE — 1036F TOBACCO NON-USER: CPT | Performed by: NURSE PRACTITIONER

## 2022-02-24 PROCEDURE — 3079F DIAST BP 80-89 MM HG: CPT | Performed by: NURSE PRACTITIONER

## 2022-02-24 NOTE — PROGRESS NOTES
Chief Complaint   Patient presents with    Medicare Wellness Visit     6 month follow up      Health Maintenance   Topic Date Due    Hepatitis C Screening  Never done    DTaP,Tdap,and Td Vaccines (1 - Tdap) Never done    Pneumococcal Vaccine: 65+ Years (2 of 2 - PPSV23) 12/16/2020    COVID-19 Vaccine (3 - Booster for Pfizer series) 08/08/2021    Influenza Vaccine (1) Never done    Medicare Annual Wellness Visit (AWV)  02/15/2022    Fall Risk  02/24/2023    Depression Screening  02/24/2023    BMI: Adult  02/24/2023    HIB Vaccine  Aged Out    Hepatitis B Vaccine  Aged Out    IPV Vaccine  Aged Out    Hepatitis A Vaccine  Aged Out    Meningococcal ACWY Vaccine  Aged Out    HPV Vaccine  Aged Out        Assessment and Plan:     Problem List Items Addressed This Visit        Cardiovascular and Mediastinum    Essential hypertension       Other    Obesity (BMI 30 0-34  9)    Mixed hyperlipidemia    Relevant Orders    Lipid panel      Other Visit Diagnoses     Medicare annual wellness visit, subsequent    -  Primary    IFG (impaired fasting glucose)        Relevant Orders    Comprehensive metabolic panel    Hemoglobin A1C    Need for hepatitis C screening test        Relevant Orders    Hepatitis C Antibody ([facility])    Encounter for immunization        Relevant Orders    PNEUMOCOCCAL POLYSACCHARIDE VACCINE 23-VALENT =>3YO SQ IM        BMI Counseling: Body mass index is 33 6 kg/m²  The BMI is above normal  Nutrition recommendations include encouraging healthy choices of fruits and vegetables  Exercise recommendations include exercising 3-5 times per week  Rationale for BMI follow-up plan is due to patient being overweight or obese  Depression Screening and Follow-up Plan: Patient was screened for depression during today's encounter  They screened negative with a PHQ-2 score of 0        Preventive health issues were discussed with patient, and age appropriate screening tests were ordered as noted in patient's After Visit Summary  Personalized health advice and appropriate referrals for health education or preventive services given if needed, as noted in patient's After Visit Summary  History of Present Illness:     Patient presents for Medicare Annual Wellness visit    Patient Care Team:  YAQUELIN Zabala as PCP - General (Family Medicine)     Problem List:     Patient Active Problem List   Diagnosis    Essential hypertension    Obesity (BMI 30 0-34  5)    Former smoker    Mixed hyperlipidemia    Arthritis      Past Medical and Surgical History:     Past Medical History:   Diagnosis Date    Back pain      No past surgical history on file  Family History:     Family History   Problem Relation Age of Onset    Cancer Mother     Heart attack Father 48      Social History:     Social History     Socioeconomic History    Marital status: /Civil Union     Spouse name: None    Number of children: None    Years of education: None    Highest education level: None   Occupational History    None   Tobacco Use    Smoking status: Former Smoker     Packs/day: 1 00     Types: Cigarettes     Start date: 11/10/1956     Quit date: 12/3/2019     Years since quittin 2    Smokeless tobacco: Never Used   Substance and Sexual Activity    Alcohol use:  Yes    Drug use: Never    Sexual activity: Yes     Partners: Female   Other Topics Concern    None   Social History Narrative    None     Social Determinants of Health     Financial Resource Strain: Not on file   Food Insecurity: Not on file   Transportation Needs: Not on file   Physical Activity: Not on file   Stress: Not on file   Social Connections: Not on file   Intimate Partner Violence: Not on file   Housing Stability: Not on file      Medications and Allergies:     Current Outpatient Medications   Medication Sig Dispense Refill    amLODIPine (NORVASC) 5 mg tablet Take 1 tablet (5 mg total) by mouth daily 90 tablet 1    aspirin (ECOTRIN LOW STRENGTH) 81 mg EC tablet Take 81 mg by mouth daily      atorvastatin (LIPITOR) 10 mg tablet Take 1 tablet (10 mg total) by mouth daily 30 tablet 5    fluticasone (FLONASE) 50 mcg/act nasal spray 2 sprays into each nostril daily 1 Bottle 5    Omega-3 Fatty Acids (FISH OIL) 1,000 mg Take 1,000 mg by mouth daily       No current facility-administered medications for this visit  Allergies   Allergen Reactions    Lisinopril Cough      Immunizations:     Immunization History   Administered Date(s) Administered    COVID-19 PFIZER VACCINE 0 3 ML IM 02/17/2021, 03/08/2021    Pneumococcal Conjugate 13-Valent 12/16/2019      Health Maintenance:         Topic Date Due    Hepatitis C Screening  Never done         Topic Date Due    DTaP,Tdap,and Td Vaccines (1 - Tdap) Never done    Pneumococcal Vaccine: 65+ Years (2 of 2 - PPSV23) 12/16/2020    COVID-19 Vaccine (3 - Booster for Pfizer series) 08/08/2021    Influenza Vaccine (1) Never done      Medicare Health Risk Assessment:     /84   Pulse 60   Temp 98 4 °F (36 9 °C) (Tympanic)   Resp 16   Ht 5' 8" (1 727 m)   Wt 100 kg (221 lb)   BMI 33 60 kg/m²      Mirna Fonseca is here for his Subsequent Wellness visit  Last Medicare Wellness visit information reviewed, patient interviewed, no change since last AWV  Health Risk Assessment:   Patient rates overall health as very good  Patient feels that their physical health rating is same  Patient is satisfied with their life  Eyesight was rated as same  Hearing was rated as slightly worse  Patient feels that their emotional and mental health rating is same  Patients states they are never, rarely angry  Patient states they are never, rarely unusually tired/fatigued  Pain experienced in the last 7 days has been none  Patient states that he has experienced no weight loss or gain in last 6 months  Depression Screening:   PHQ-2 Score: 0      Fall Risk Screening:    In the past year, patient has experienced: no history of falling in past year      Home Safety:  Patient does not have trouble with stairs inside or outside of their home  Patient has working smoke alarms and has working carbon monoxide detector  Home safety hazards include: none  Nutrition:   Current diet is Regular  Medications:   Patient is currently taking over-the-counter supplements  OTC medications include: see medication list  Patient is able to manage medications  Activities of Daily Living (ADLs)/Instrumental Activities of Daily Living (IADLs):   Walk and transfer into and out of bed and chair?: Yes  Dress and groom yourself?: Yes    Bathe or shower yourself?: Yes    Feed yourself? Yes  Do your laundry/housekeeping?: Yes  Manage your money, pay your bills and track your expenses?: Yes  Make your own meals?: Yes    Do your own shopping?: Yes    Previous Hospitalizations:   Any hospitalizations or ED visits within the last 12 months?: No      Advance Care Planning:   Living will: Yes    Durable POA for healthcare:  Yes    Advanced directive: Yes    Five wishes given: Yes    Provider agrees with end of life decisions: Yes      PREVENTIVE SCREENINGS      Cardiovascular Screening:    General: Screening Not Indicated and History Lipid Disorder    Due for: Lipid Panel      Diabetes Screening:     General: Screening Current      Colorectal Cancer Screening:     General: Patient Declines      Prostate Cancer Screening:    General: Screening Not Indicated      Osteoporosis Screening:    General: Patient Declines      Abdominal Aortic Aneurysm (AAA) Screening:    Risk factors include: tobacco use        Lung Cancer Screening:     General: Screening Not Indicated      Hepatitis C Screening:    General: Risks and Benefits Discussed    Hep C Screening Accepted: Yes      Screening, Brief Intervention, and Referral to Treatment (SBIRT)    Screening      AUDIT-C Screenin) How often did you have a drink containing alcohol in the past year? never  2) How many drinks did you have on a typical day when you were drinking in the past year? 0  3) How often did you have 6 or more drinks on one occasion in the past year? never    AUDIT-C Score: 0  Interpretation: Score 0-3 (male): Negative screen for alcohol misuse    Single Item Drug Screening:  How often have you used an illegal drug (including marijuana) or a prescription medication for non-medical reasons in the past year? never    Single Item Drug Screen Score: 0  Interpretation: Negative screen for possible drug use disorder      YAQUELIN Turner

## 2022-02-24 NOTE — PROGRESS NOTES
Assessment/Plan:    Pneumovax administered today   Pt declined the flu vaccine  He'll check with his insurance regarding coverage of the Tdap vaccine    Essential hypertension  BP a little elevated today   He recently started riding his stationary bike again  SBP at home averaging mid 120s to 130s  I asked him to send me his BP log in 2 weeks for review  Continue Amlodipine 5 mg daily for now   Follow a low sodium diet   Lab work as ordered    Obesity (BMI 30 0-34  9)  Continue with stationary bike and walking dog regularly   Dietary modifications encouraged     Mixed hyperlipidemia  Colby was on a statin a few years ago, stopped on his own because he "didn't like it"  He is aware of the risks of this   Updated lipid panel ordered  Continue ASA and Krill oil daily     Former smoker  Quit about 3 years ago (started smoking again for a few months about a year ago)  60 ppd year history   He has no interest in the [place] lung screening program        Diagnoses and all orders for this visit:    Medicare annual wellness visit, subsequent    Essential hypertension    Mixed hyperlipidemia  -     Lipid panel; Future    Obesity (BMI 30 0-34 9)    IFG (impaired fasting glucose)  -     Comprehensive metabolic panel; Future  -     Hemoglobin A1C; Future    Need for hepatitis C screening test  -     Hepatitis C Antibody ([facility]); Future    Encounter for immunization  -     PNEUMOCOCCAL POLYSACCHARIDE VACCINE 23-VALENT =>3YO SQ IM    Former smoker          Subjective:      Patient ID: Kathi Chen is a 67 y o  male  HPI     Pt presents by himself today for a routine follow up  Reports he is doing well overall, no acute concerns today   Brigido Galloway has been enjoying time with his family, has lots of grandkids- the 2 youngest are 1 and 1 yo  Enjoys fishing and hunting     HTN- Currently taking Amlodipine 5 mg daily  He notes his SBP has been averaging mid 120s to 130s  BP is a little elevated today    Takes his Amlodipine around 5pm   He rides a stationary bike and walks his dog often-- didn't do this much last month but restarted recently, gained 17 lb in 6 months  Denies chest pain, palpitations, edema, SOB, dizziness, headaches      HLD- reports he has been off of Lipitor for approximately 2 years now as his cholesterol was "good"  Lipid panel from 9/2021 /  / HDL 27/   He is due for an updated lipid panel now  ASCVD risk at 45 5%   He has no interest in restarting a statin medication as he "didn't feel well on this"  He is taking ASA 81 mg and Krill oil daily      Former smoker- he originally quit around 11/2019, started smoking again in August for about 2 5 months but quit again   60 ppd years, no interest in doing the CT lung screening  Carolyn Lai does feel better after quitting, less coughing        The following portions of the patient's history were reviewed and updated as appropriate: allergies, current medications, past family history, past medical history, past social history, past surgical history and problem list     Review of Systems   Constitutional: Negative for activity change, appetite change, chills, diaphoresis, fatigue, fever and unexpected weight change  HENT: Negative for ear pain and sore throat  Eyes: Negative for pain and visual disturbance  Respiratory: Negative for cough, shortness of breath and wheezing  Cardiovascular: Negative for chest pain, palpitations and leg swelling  Gastrointestinal: Negative for abdominal pain and vomiting  Genitourinary: Negative for dysuria and hematuria  Musculoskeletal: Negative for arthralgias and back pain  Skin: Negative for color change and rash  Neurological: Negative for seizures and syncope  Hematological: Negative for adenopathy  Does not bruise/bleed easily  Psychiatric/Behavioral: Negative for dysphoric mood, self-injury, sleep disturbance and suicidal ideas  The patient is not nervous/anxious      All other systems reviewed and are negative  Objective:      /84   Pulse 60   Temp 98 4 °F (36 9 °C) (Tympanic)   Resp 16   Ht 5' 8" (1 727 m)   Wt 100 kg (221 lb)   BMI 33 60 kg/m²          Physical Exam  Constitutional:       General: He is not in acute distress  Appearance: He is well-developed  He is obese  He is not ill-appearing, toxic-appearing or diaphoretic  HENT:      Head: Normocephalic and atraumatic  Eyes:      Extraocular Movements: Extraocular movements intact  Conjunctiva/sclera: Conjunctivae normal       Pupils: Pupils are equal, round, and reactive to light  Neck:      Thyroid: No thyromegaly  Vascular: No carotid bruit  Cardiovascular:      Rate and Rhythm: Normal rate and regular rhythm  Heart sounds: Normal heart sounds  No murmur heard  Pulmonary:      Effort: Pulmonary effort is normal  No respiratory distress  Breath sounds: Normal breath sounds  No wheezing  Abdominal:      General: Bowel sounds are normal  There is no distension  Palpations: Abdomen is soft  Tenderness: There is no abdominal tenderness  Musculoskeletal:         General: Normal range of motion  Cervical back: Normal range of motion and neck supple  No tenderness  Right lower leg: No edema  Left lower leg: No edema  Lymphadenopathy:      Cervical: No cervical adenopathy  Skin:     General: Skin is warm and dry  Neurological:      General: No focal deficit present  Mental Status: He is alert and oriented to person, place, and time  Psychiatric:         Mood and Affect: Mood normal          Behavior: Behavior normal          Thought Content:  Thought content normal          Judgment: Judgment normal

## 2022-02-24 NOTE — PATIENT INSTRUCTIONS
Medicare Preventive Visit Patient Instructions  Thank you for completing your Welcome to Medicare Visit or Medicare Annual Wellness Visit today  Your next wellness visit will be due in one year (2/25/2023)  The screening/preventive services that you may require over the next 5-10 years are detailed below  Some tests may not apply to you based off risk factors and/or age  Screening tests ordered at today's visit but not completed yet may show as past due  Also, please note that scanned in results may not display below  Preventive Screenings:  Service Recommendations Previous Testing/Comments   Colorectal Cancer Screening  · Colonoscopy    · Fecal Occult Blood Test (FOBT)/Fecal Immunochemical Test (FIT)  · Fecal DNA/Cologuard Test  · Flexible Sigmoidoscopy Age: 54-65 years old   Colonoscopy: every 10 years (May be performed more frequently if at higher risk)  OR  FOBT/FIT: every 1 year  OR  Cologuard: every 3 years  OR  Sigmoidoscopy: every 5 years  Screening may be recommended earlier than age 48 if at higher risk for colorectal cancer  Also, an individualized decision between you and your healthcare provider will decide whether screening between the ages of 74-80 would be appropriate   Colonoscopy: Not on file  FOBT/FIT: Not on file  Cologuard: Not on file  Sigmoidoscopy: Not on file          Prostate Cancer Screening Individualized decision between patient and health care provider in men between ages of 53-78   Medicare will cover every 12 months beginning on the day after your 50th birthday PSA: No results in last 5 years           Hepatitis C Screening Once for adults born between [facility]  More frequently in patients at high risk for Hepatitis C Hep C Antibody: Not on file        Diabetes Screening 1-2 times per year if you're at risk for diabetes or have pre-diabetes Fasting glucose: No results in last 5 years   A1C: No results in last 5 years        Cholesterol Screening Once every 5 years if you don't have a lipid disorder  May order more often based on risk factors  Lipid panel: 09/04/2021           Other Preventive Screenings Covered by Medicare:  1  Abdominal Aortic Aneurysm (AAA) Screening: covered once if your at risk  You're considered to be at risk if you have a family history of AAA or a male between the age of 73-68 who smoking at least 100 cigarettes in your lifetime  2  Lung Cancer Screening: covers low dose CT scan once per year if you meet all of the following conditions: (1) Age 50-69; (2) No signs or symptoms of lung cancer; (3) Current smoker or have quit smoking within the last 15 years; (4) You have a tobacco smoking history of at least 30 pack years (packs per day x number of years you smoked); (5) You get a written order from a healthcare provider  3  Glaucoma Screening: covered annually if you're considered high risk: (1) You have diabetes OR (2) Family history of glaucoma OR (3)  aged 48 and older OR (3)  American aged 72 and older  3  Osteoporosis Screening: covered every 2 years if you meet one of the following conditions: (1) Have a vertebral abnormality; (2) On glucocorticoid therapy for more than 3 months; (3) Have primary hyperparathyroidism; (4) On osteoporosis medications and need to assess response to drug therapy  5  HIV Screening: covered annually if you're between the age of 12-76  Also covered annually if you are younger than 13 and older than 72 with risk factors for HIV infection  For pregnant patients, it is covered up to 3 times per pregnancy      Immunizations:  Immunization Recommendations   Influenza Vaccine Annual influenza vaccination during flu season is recommended for all persons aged >= 6 months who do not have contraindications   Pneumococcal Vaccine (Prevnar and Pneumovax)  * Prevnar = PCV13  * Pneumovax = PPSV23 Adults 25-60 years old: 1-3 doses may be recommended based on certain risk factors  Adults 72 years old: Prevnar (PCV13) vaccine recommended followed by Pneumovax (PPSV23) vaccine  If already received PPSV23 since turning 65, then PCV13 recommended at least one year after PPSV23 dose  Hepatitis B Vaccine 3 dose series if at intermediate or high risk (ex: diabetes, end stage renal disease, liver disease)   Tetanus (Td) Vaccine - COST NOT COVERED BY MEDICARE PART B Following completion of primary series, a booster dose should be given every 10 years to maintain immunity against tetanus  Td may also be given as tetanus wound prophylaxis  Tdap Vaccine - COST NOT COVERED BY MEDICARE PART B Recommended at least once for all adults  For pregnant patients, recommended with each pregnancy  Shingles Vaccine (Shingrix) - COST NOT COVERED BY MEDICARE PART B  2 shot series recommended in those aged 48 and above     Health Maintenance Due:      Topic Date Due    Hepatitis C Screening  Never done     Immunizations Due:      Topic Date Due    DTaP,Tdap,and Td Vaccines (1 - Tdap) Never done    Pneumococcal Vaccine: 65+ Years (2 of 2 - PPSV23) 12/16/2020    COVID-19 Vaccine (3 - Booster for Pfizer series) 08/08/2021    Influenza Vaccine (1) Never done     Advance Directives   What are advance directives? Advance directives are legal documents that state your wishes and plans for medical care  These plans are made ahead of time in case you lose your ability to make decisions for yourself  Advance directives can apply to any medical decision, such as the treatments you want, and if you want to donate organs  What are the types of advance directives? There are many types of advance directives, and each state has rules about how to use them  You may choose a combination of any of the following:  · Living will: This is a written record of the treatment you want  You can also choose which treatments you do not want, which to limit, and which to stop at a certain time  This includes surgery, medicine, IV fluid, and tube feedings     · Durable power of  for healthcare [facility]): This is a written record that states who you want to make healthcare choices for you when you are unable to make them for yourself  This person, called a proxy, is usually a family member or a friend  You may choose more than 1 proxy  · Do not resuscitate (DNR) order:  A DNR order is used in case your heart stops beating or you stop breathing  It is a request not to have certain forms of treatment, such as CPR  A DNR order may be included in other types of advance directives  · Medical directive: This covers the care that you want if you are in a coma, near death, or unable to make decisions for yourself  You can list the treatments you want for each condition  Treatment may include pain medicine, surgery, blood transfusions, dialysis, IV or tube feedings, and a ventilator (breathing machine)  · Values history: This document has questions about your views, beliefs, and how you feel and think about life  This information can help others choose the care that you would choose  Why are advance directives important? An advance directive helps you control your care  Although spoken wishes may be used, it is better to have your wishes written down  Spoken wishes can be misunderstood, or not followed  Treatments may be given even if you do not want them  An advance directive may make it easier for your family to make difficult choices about your care  © Copyright [employer] 2018 Information is for End User's use only and may not be sold, redistributed or otherwise used for commercial purposes   All illustrations and images included in CareNotes® are the copyrighted property of [employer]  or [address] [place], [place] [address]

## 2022-02-28 ENCOUNTER — APPOINTMENT (OUTPATIENT)
Dept: LAB | Facility: CLINIC | Age: 77
End: 2022-02-28
Payer: COMMERCIAL

## 2022-02-28 DIAGNOSIS — Z11.59 NEED FOR HEPATITIS C SCREENING TEST: ICD-10-CM

## 2022-02-28 DIAGNOSIS — R73.01 IFG (IMPAIRED FASTING GLUCOSE): ICD-10-CM

## 2022-02-28 DIAGNOSIS — E78.2 MIXED HYPERLIPIDEMIA: ICD-10-CM

## 2022-02-28 LAB
ALBUMIN SERPL BCP-MCNC: 3.6 G/DL (ref 3.5–5)
ALP SERPL-CCNC: 148 U/L (ref 46–116)
ALT SERPL W P-5'-P-CCNC: 30 U/L (ref 12–78)
ANION GAP SERPL CALCULATED.3IONS-SCNC: 11 MMOL/L (ref 4–13)
AST SERPL W P-5'-P-CCNC: 18 U/L (ref 5–45)
BILIRUB SERPL-MCNC: 0.33 MG/DL (ref 0.2–1)
BUN SERPL-MCNC: 14 MG/DL (ref 5–25)
CALCIUM SERPL-MCNC: 8.9 MG/DL (ref 8.3–10.1)
CHLORIDE SERPL-SCNC: 107 MMOL/L (ref 100–108)
CHOLEST SERPL-MCNC: 189 MG/DL
CO2 SERPL-SCNC: 24 MMOL/L (ref 21–32)
CREAT SERPL-MCNC: 1.04 MG/DL (ref 0.6–1.3)
GFR SERPL CREATININE-BSD FRML MDRD: 68 ML/MIN/1.73SQ M
GLUCOSE P FAST SERPL-MCNC: 99 MG/DL (ref 65–99)
HCV AB SER QL: NORMAL
HDLC SERPL-MCNC: 32 MG/DL
LDLC SERPL CALC-MCNC: 125 MG/DL (ref 0–100)
NONHDLC SERPL-MCNC: 157 MG/DL
POTASSIUM SERPL-SCNC: 3.8 MMOL/L (ref 3.5–5.3)
PROT SERPL-MCNC: 8 G/DL (ref 6.4–8.2)
SODIUM SERPL-SCNC: 142 MMOL/L (ref 136–145)
TRIGL SERPL-MCNC: 159 MG/DL

## 2022-02-28 PROCEDURE — 86803 HEPATITIS C AB TEST: CPT

## 2022-02-28 PROCEDURE — 36415 COLL VENOUS BLD VENIPUNCTURE: CPT

## 2022-02-28 PROCEDURE — 80053 COMPREHEN METABOLIC PANEL: CPT

## 2022-02-28 PROCEDURE — 83036 HEMOGLOBIN GLYCOSYLATED A1C: CPT

## 2022-02-28 PROCEDURE — 80061 LIPID PANEL: CPT

## 2022-03-01 ENCOUNTER — TELEPHONE (OUTPATIENT)
Dept: FAMILY MEDICINE CLINIC | Facility: CLINIC | Age: 77
End: 2022-03-01

## 2022-03-01 LAB
EST. AVERAGE GLUCOSE BLD GHB EST-MCNC: 105 MG/DL
HBA1C MFR BLD: 5.3 %

## 2022-03-01 NOTE — TELEPHONE ENCOUNTER
Spoke with pt to review recent lab work    He noted that he has been checking his BP more frequently (anywhere from 2-5 times daily) over the past week or so  BP in the morning averaging 145-150/ 80-90  BP in the evening averaging 125-128/75  He has been taking Amlodipine 5 mg in the evening around 5pm   Denies CP, palpitations, edema, SOB, dizziness, headaches  He has started riding his stationary bike again     Plan: He will take Amlodipine 5 mg first thing in the morning and then Amlodipine 5m g in the evening    He'll call with his BP readings in one week

## 2022-03-10 NOTE — TELEPHONE ENCOUNTER
Patient phoned with his Blood Pressure Readings  They are as follows:  Morning average is 131/77 - today's was 132/78  The evening average is 116/64, last night's was 123/62  Patient can be contacted at [phone] with any questions

## 2022-03-10 NOTE — TELEPHONE ENCOUNTER
BP readings look good- continue with same medications and continue checking BP    If BP is consistently >140/90, he should let us know

## 2022-08-24 DIAGNOSIS — E78.2 MIXED HYPERLIPIDEMIA: ICD-10-CM

## 2022-08-24 DIAGNOSIS — I10 ESSENTIAL HYPERTENSION: ICD-10-CM

## 2022-08-24 RX ORDER — ATORVASTATIN CALCIUM 10 MG/1
TABLET, FILM COATED ORAL
Qty: 90 TABLET | Refills: 1 | Status: SHIPPED | OUTPATIENT
Start: 2022-08-24

## 2022-08-24 RX ORDER — AMLODIPINE BESYLATE 5 MG/1
TABLET ORAL
Qty: 180 TABLET | Refills: 1 | Status: SHIPPED | OUTPATIENT
Start: 2022-08-24

## 2022-11-09 ENCOUNTER — TELEPHONE (OUTPATIENT)
Dept: FAMILY MEDICINE CLINIC | Facility: CLINIC | Age: 77
End: 2022-11-09

## 2022-11-09 NOTE — TELEPHONE ENCOUNTER
Patient ([phone]) called to report cold symptoms  Request antibiotic  Made patient aware he would have to been seen for appointment before any medication could be prescribed  Patient declined to schedule

## 2023-02-17 DIAGNOSIS — Z12.5 PROSTATE CANCER SCREENING: ICD-10-CM

## 2023-02-17 DIAGNOSIS — E66.9 OBESITY (BMI 30.0-34.9): ICD-10-CM

## 2023-02-17 DIAGNOSIS — I10 ESSENTIAL HYPERTENSION: Primary | ICD-10-CM

## 2023-02-17 DIAGNOSIS — E78.2 MIXED HYPERLIPIDEMIA: ICD-10-CM

## 2023-03-03 ENCOUNTER — OFFICE VISIT (OUTPATIENT)
Dept: FAMILY MEDICINE CLINIC | Facility: CLINIC | Age: 78
End: 2023-03-03

## 2023-03-03 VITALS
TEMPERATURE: 97.7 F | WEIGHT: 234.2 LBS | DIASTOLIC BLOOD PRESSURE: 84 MMHG | SYSTOLIC BLOOD PRESSURE: 160 MMHG | HEIGHT: 69 IN | HEART RATE: 88 BPM | RESPIRATION RATE: 20 BRPM | BODY MASS INDEX: 34.69 KG/M2

## 2023-03-03 DIAGNOSIS — I10 ESSENTIAL HYPERTENSION: Primary | ICD-10-CM

## 2023-03-03 DIAGNOSIS — E66.9 OBESITY (BMI 30.0-34.9): ICD-10-CM

## 2023-03-03 DIAGNOSIS — M19.90 ARTHRITIS: ICD-10-CM

## 2023-03-03 DIAGNOSIS — E78.2 MIXED HYPERLIPIDEMIA: ICD-10-CM

## 2023-03-03 NOTE — ASSESSMENT & PLAN NOTE
Blood pressure elevated in the office today at 160/84  The patient states that he checked his pressure at home this morning and it was 130s over 80s  He checks his pressure several times per week and it is usually in that range  He has started riding his bike more outside since the weather is getting better  He continues on his amlodipine  Information provided to the patient regarding a low-sodium diet  He is due for blood work and I did remind him to have this blood work performed  He will see Nydia Joel back in the office in 6 months

## 2023-03-03 NOTE — ASSESSMENT & PLAN NOTE
Patient continues to exercise by walking his dog and riding his bike  He has gained about 12 pounds since last year    Dietary modifications discussed

## 2023-03-03 NOTE — PATIENT INSTRUCTIONS
DASH Eating Plan   WHAT YOU NEED TO KNOW:   The DASH (Dietary Approaches to Stop Hypertension) Eating Plan is designed to help prevent or lower high blood pressure  It can also help to lower LDL (bad) cholesterol and decrease your risk for heart disease  The plan is low in sodium, sugar, unhealthy fats, and total fat  It is high in potassium, calcium, magnesium, and fiber  These nutrients are added when you eat more fruits, vegetables, and whole grains  With the DASH eating plan, you need to eat a certain number of servings from each food group  This will help you get enough of certain nutrients and limit others  The amount of servings you should eat depends on how many calories you need  Your dietitian can help you create meal plans with the right number of servings for each food group  DISCHARGE INSTRUCTIONS:   What you need to know about sodium:  Your dietitian will tell you how much sodium is safe for you to have each day  People with high blood pressure should have no more than 1,500 to 2,300 mg of sodium in a day  A teaspoon (tsp) of salt has 2,300 mg of sodium  This may seem like a difficult goal, but small changes to the foods you eat can make a big difference  Your healthcare provider or dietitian can help you create a meal plan that follows your sodium limit  Read food labels  Food labels can help you choose foods that are low in sodium  The amount of sodium is listed in milligrams (mg)  The % Daily Value (DV) column tells you how much of your daily needs are met by 1 serving of the food for each nutrient listed  Choose foods that have less than 5% of the DV of sodium  These foods are considered low in sodium  Foods that have 20% or more of the DV of sodium are considered high in sodium  Avoid foods that have more than 300 mg of sodium in each serving  Choose foods that say low-sodium, reduced-sodium, or no salt added on the food label  Limit added salt    Do not salt food at the table if 53 you add salt when you cook  Use herbs and spices, such as onions, garlic, and salt-free seasonings to add flavor  Try lemon or lime juice or vinegar to add a tart flavor  Use hot peppers or a small amount of hot pepper sauce to add a spicy flavor  Limit foods high in added salt, such as the following:    Seasonings made with salt, such as garlic salt, celery salt, onion salt, seasoned salt, meat tenderizers, and monosodium glutamate (MSG)    Miso soup and canned or dried soup mixes    Regular soy sauce, barbecue sauce, teriyaki sauce, steak sauce, Worcestershire sauce, and most flavored vinegars    Snack foods, such as salted chips, popcorn, pretzels, pork rinds, salted crackers, and salted nuts    Frozen foods, such as dinners, entrees, vegetables with sauces, and breaded meats    Ask about salt substitutes  Ask your healthcare provider if you may use salt substitutes  Some salt substitutes have ingredients that can be harmful if you have certain health conditions  Choose foods carefully at restaurants  Meals from restaurants, especially fast food restaurants, are often high in sodium  Some restaurants have nutrition information that tells you the amount of sodium in their foods  Ask to have your food prepared with less, or no salt  What you need to know about fats:  Healthy fats include unsaturated fats and omega-3 fatty acids  Unhealthy fats include saturated fats and trans fats    Include healthy fats, such as the following:      Cooking oils, such as soybean, canola, olive, or sunflower    Fatty fish, such as salmon, tuna, mackerel, or sardines    Flaxseed oil or ground flaxseed    ½ cup of cooked beans, such as black beans, kidney beans, or yepez beans    1½ ounces of low-sodium nuts, such as almonds or walnuts    Low-sugar, low-sodium peanut butter    Seeds such as patrick seeds or sunflower seeds       Limit or do not have unhealthy fats, such as the following:      Foods that contain fat from animals, such as fatty meats, whole milk, butter, and cream    Shortening, stick margarine, palm oil, and coconut oil    Full-fat or creamy salad dressing    Creamy soup    Crackers, chips, and baked goods made with margarine or shortening    Foods that are fried in unhealthy fats    Gravy and sauces, such as Alonso or cheese sauces    What you need to know about carbohydrates (carbs): All carbs break down into sugar  Complex carbs contain more fiber than simple carbs  This means complex carbs go into the bloodstream more slowly and cause less of a blood sugar spike  Try to include more complex carbs and fewer simple carbs  Include complex carbs, such as the followin slice of whole-grain bread    1 ounce of dry cereal that does not contain added sugar    ½ cup of cooked oatmeal    2 ounces of cooked whole-grain pasta    ½ cup of cooked brown rice    Limit or do not have simple carbs, such as the following:      [employer], such as doughnuts, pastries, and cookies    Mixes for cornbread and biscuits    White rice and pasta mixes, such as boxed macaroni and cheese    Instant and cold cereals that contain sugar    Jelly, jam, and ice cream that contain sugar    Condiments such as ketchup    Drinks high in sugar, such as soft drinks, lemonade, and fruit juice    What you need to know about vegetables and fruits:  Vegetables and fruits can be fresh, frozen, or canned  If possible, try to choose low-sodium canned options    Include a variety of vegetables and fruits, such as the followin medium apple, pear, or peach (about ½ cup chopped)    ½ small banana    ½ cup berries, such as blueberries, strawberries, or blackberries    1 cup of raw leafy greens, such as lettuce, spinach, kale, or bertram greens    ½ cup of frozen or canned (no added salt) vegetables, such as green beans    ½ cup of fresh, frozen, or canned fruit (canned in light syrup or fruit juice)    ½ cup of vegetable or fruit juice    Limit or do not have vegetables and fruits made in the following ways:      Frozen fruit such as cherries that have added sugar    Fruit in cream or butter sauce    Canned vegetables that are high in sodium    Sauerkraut, pickled vegetables, and other foods prepared in brine    Fried vegetables or vegetables in butter or high-fat sauces    What you need to know about protein foods: Include lean or low-fat protein foods, such as the following:      Poultry (chicken, turkey) with no skin    Fish (especially fatty fish, such as salmon, fresh tuna, or mackerel)    Lean beef and pork (loin, round, extra lean hamburger)    Egg whites and egg substitutes    1 cup of nonfat (skim) or 1% milk    1½ ounces of fat-free or low-fat cheese    6 ounces of nonfat or low-fat yogurt    Limit or do not have high-fat protein foods, such as the following:      Smoked or cured meat, such as corned beef, [place], ham, hot dogs, and sausage    Canned beans and canned meats or spreads, such as potted meats, sardines, anchovies, and imitation seafood    Deli or lunch meats, such as bologna, ham, turkey, and roast beef    High-fat meat (T-bone steak, regular hamburger, and ribs)    Whole eggs and egg yolks    Whole milk, 2% milk, and cream    Regular cheese and processed cheese    Other guidelines to follow:   Maintain a healthy weight  Your risk for heart disease is higher if you are overweight  Your healthcare provider may suggest that you lose weight if you are overweight  You can lose weight by eating fewer calories and foods that have added sugars and fat  The DASH meal plan can help you do this  Decrease calories by eating smaller portions at each meal and fewer snacks  Ask your healthcare provider for more information about how to lose weight  Exercise regularly  Regular exercise can help you reach or maintain a healthy weight  Regular exercise can also help decrease your blood pressure and improve your cholesterol levels   Get 30 minutes or more of moderate exercise each day of the week  To lose weight, get at least 60 minutes of exercise  Talk to your healthcare provider about the best exercise program for you  Limit alcohol  Women should limit alcohol to 1 drink a day  Men should limit alcohol to 2 drinks a day  A drink of alcohol is 12 ounces of beer, 5 ounces of wine, or 1½ ounces of liquor  For more information:   [facility] and [address]  [id number]  [place] [address]  Phone: [phone] [phone]  Web Address: [facility] no    © Copyright [employer] 2022 Information is for End User's use only and may not be sold, redistributed or otherwise used for commercial purposes  The above information is an  only  It is not intended as medical advice for individual conditions or treatments  Talk to your doctor, nurse or pharmacist before following any medical regimen to see if it is safe and effective for you  Good fat  Good fats come mainly from vegetables, nuts, seeds, and fish  They differ from saturated fats by having fewer hydrogen atoms bonded to their carbon chains  Healthy fats are liquid at room temperature, not solid  There are two broad categories of beneficial fats: monounsaturated and polyunsaturated fats  Monounsaturated fats  When you dip your bread in olive oil at an [place], you're getting mostly monounsaturated fat  Monounsaturated fats have a single carbon-to-carbon double bond  The result is that it has two fewer hydrogen atoms than a saturated fat and a bend at the double bond  This structure keeps monounsaturated fats liquid at room temperature  Good sources of monounsaturated fats are olive oil, peanut oil, canola oil, avocados, and most nuts, as well as high-oleic safflower and sunflower oils  The discovery that monounsaturated fat could be healthful came from the [place] Study during the 1960s  It revealed that people in [place] and other parts of the [address] [facility] enjoyed a low rate of heart disease despite a high-fat diet  The main fat in their diet, though, was not the saturated animal fat common in countries with higher rates of heart disease  It was olive oil, which contains mainly monounsaturated fat  This finding produced a surge of interest in olive oil and the "Mediterranean diet," a style of eating regarded as a healthful choice today  Although there's no recommended daily intake of monounsaturated fats, the [place] [employer] recommends using them as much as possible along with polyunsaturated fats to replace saturated and trans fats  Polyunsaturated fats  When you pour liquid cooking oil into a pan, there's a good chance you're using polyunsaturated fat  Corn oil, sunflower oil, and safflower oil are common examples  Polyunsaturated fats are essential fats  That means they're required for normal body functions but your body can't make them  So you must get them from food  Polyunsaturated fats are used to build cell membranes and the covering of nerves  They are needed for blood clotting, muscle movement, and inflammation  A polyunsaturated fat has two or more double bonds in its carbon chain  There are two main types of polyunsaturated fats: omega-3 fatty acids and omega-6 fatty acids  The numbers refer to the distance between the beginning of the carbon chain and the first double bond  Both types offer health benefits  Eating polyunsaturated fats in place of saturated fats or highly refined carbohydrates reduces harmful LDL cholesterol and improves the cholesterol profile  It also lowers triglycerides  Good sources of omega-3 fatty acids include fatty fish such as salmon, mackerel, and sardines, flaxseeds, walnuts, canola oil, and unhydrogenated soybean oil  Omega-3 fatty acids may help prevent and even treat heart disease and stroke   In addition to reducing blood pressure, raising HDL, and lowering triglycerides, polyunsaturated fats may help prevent lethal heart rhythms from arising  Evidence also suggests they may help reduce the need for corticosteroid medications in people with rheumatoid arthritis  Studies linking omega-3s to a wide range of other health improvements, including reducing risk of dementia, are inconclusive, and some of them have major flaws, according to a systematic review of the evidence by the Agency for Healthcare Research and Quality  Omega-6 fatty acids have also been linked to protection against heart disease  Foods rich in linoleic acid and other omega-6 fatty acids include vegetable oils such as safflower, soybean, sunflower, walnut, and corn oils  Cholesterol and Your Health   AMBULATORY CARE:   Cholesterol  is a waxy, fat-like substance  Your body uses cholesterol to make hormones and new cells, and to protect nerves  Cholesterol is made by your body  It also comes from certain foods you eat, such as meat and dairy products  Your healthcare provider can help you set goals for your cholesterol levels  He or she can help you create a plan to meet your goals  Cholesterol level goals: Your cholesterol level goals depend on your risk for heart disease, your age, and your other health conditions  The following are general guidelines: Total cholesterol  includes low-density lipoprotein (LDL), high-density lipoprotein (HDL), and triglyceride levels  The total cholesterol level should be lower than 200 mg/dL and is best at about 150 mg/dL  LDL cholesterol  is called bad cholesterol  because it forms plaque in your arteries  As plaque builds up, your arteries become narrow, and less blood flows through  When plaque decreases blood flow to your heart, you may have chest pain  If plaque completely blocks an artery that brings blood to your heart, you may have a heart attack  Plaque can break off and form blood clots   Blood clots may block arteries in your brain and cause a stroke  The level should be less than 130 mg/dL and is best at about 100 mg/dL  HDL cholesterol  is called good cholesterol  because it helps remove LDL cholesterol from your arteries  It does this by attaching to LDL cholesterol and carrying it to your liver  Your liver breaks down LDL cholesterol so your body can get rid of it  High levels of HDL cholesterol can help prevent a heart attack and stroke  Low levels of HDL cholesterol can increase your risk for heart disease, heart attack, and stroke  The level should be 60 mg/dL or higher  Triglycerides  are a type of fat that store energy from foods you eat  High levels of triglycerides also cause plaque buildup  This can increase your risk for a heart attack or stroke  If your triglyceride level is high, your LDL cholesterol level may also be high  The level should be less than 150 mg/dL  Any of the following can increase your risk for high cholesterol:   Smoking cigarettes    Being overweight or obese, or not getting enough exercise    Drinking large amounts of alcohol    A medical condition such as hypertension (high blood pressure) or diabetes    Certain genes passed from your parents to you    Age older than 65 years    What you need to know about having your cholesterol levels checked: Adults 21to 39years of age should have their cholesterol levels checked every 4 to 6 years  Adults 45 years or older should have their cholesterol checked every 1 to 2 years  You may need your cholesterol checked more often, or at a younger age, if you have risk factors for heart disease  You may also need to have your cholesterol checked more often if you have other health conditions, such as diabetes  Blood tests are used to check cholesterol levels  Blood tests measure your levels of triglycerides, LDL cholesterol, and HDL cholesterol    How healthy fats affect your cholesterol levels:  Healthy fats, also called unsaturated fats, help lower LDL cholesterol and triglyceride levels  Healthy fats include the following:  Monounsaturated fats  are found in foods such as olive oil, canola oil, avocado, nuts, and olives  Polyunsaturated fats,  such as omega 3 fats, are found in fish, such as salmon, trout, and tuna  They can also be found in plant foods such as flaxseed, walnuts, and soybeans  How unhealthy fats affect your cholesterol levels:  Unhealthy fats increase LDL cholesterol and triglyceride levels  They are found in foods high in cholesterol, saturated fat, and trans fat:  Cholesterol  is found in eggs, dairy, and meat  Saturated fat  is found in butter, cheese, ice cream, whole milk, and coconut oil  Saturated fat is also found in meat, such as sausage, hot dogs, and bologna  Trans fat  is found in liquid oils and is used in fried and baked foods  Foods that contain trans fats include chips, crackers, muffins, sweet rolls, microwave popcorn, and cookies  Treatment  for high cholesterol will also decrease your risk of heart disease, heart attack, and stroke  Treatment may include any of the following:  Lifestyle changes  may include food, exercise, weight loss, and quitting smoking  You may also need to decrease the amount of alcohol you drink  Your healthcare provider will want you to start with lifestyle changes  Other treatment may be added if lifestyle changes are not enough  Your healthcare provider may recommend you work with a team to manage hyperlipidemia  The team may include medical experts such as a dietitian, an exercise or physical therapist, and a behavior therapist  Your family members may be included in helping you create lifestyle changes  Medicines  may be given to lower your LDL cholesterol, triglyceride levels, or total cholesterol level   You may need medicines to lower your cholesterol if any of the following is true:    You have a history of stroke, TIA, unstable angina, or a heart attack  Your LDL cholesterol level is 190 mg/dL or higher  You are age 36 to 76 years, have diabetes or heart disease risk factors, and your LDL cholesterol is 70 mg/dL or higher  Supplements  include fish oil, red yeast rice, and garlic  Fish oil may help lower your triglyceride and LDL cholesterol levels  It may also increase your HDL cholesterol level  Red yeast rice may help decrease your total cholesterol level and LDL cholesterol level  Garlic may help lower your total cholesterol level  Do not take any supplements without talking to your healthcare provider  Food changes you can make to lower your cholesterol levels:  A dietitian can help you create a healthy eating plan  He or she can show you how to read food labels and choose foods low in saturated fat, trans fats, and cholesterol  Decrease the total amount of fat you eat  Choose lean meats, fat-free or 1% fat milk, and low-fat dairy products, such as yogurt and cheese  Try to limit or avoid red meats  Limit or do not eat fried foods or baked goods, such as cookies  Replace unhealthy fats with healthy fats  Cook foods in olive oil or canola oil  Choose soft margarines that are low in saturated fat and trans fat  Seeds, nuts, and avocados are other examples of healthy fats  Eat foods with omega-3 fats  Examples include salmon, tuna, mackerel, walnuts, and flaxseed  Eat fish 2 times per week  Pregnant women should not eat fish that have high levels of mercury, such as shark, swordfish, and sohan mackerel  Increase the amount of high-fiber foods you eat  High-fiber foods can help lower your LDL cholesterol  Aim to get between 20 and 30 grams of fiber each day  Fruits and vegetables are high in fiber  Eat at least 5 servings each day  Other high-fiber foods are whole-grain or whole-wheat breads, pastas, or cereals, and brown rice  Eat 3 ounces of whole-grain foods each day  Increase fiber slowly   You may have abdominal discomfort, bloating, and gas if you add fiber to your diet too quickly  Eat healthy protein foods  Examples include low-fat dairy products, skinless chicken and turkey, fish, and nuts  Limit foods and drinks that are high in sugar  Your dietitian or healthcare provider can help you create daily limits for high-sugar foods and drinks  The limit may be lower if you have diabetes or another health condition  Limits can also help you lose weight if needed  Lifestyle changes you can make to lower your cholesterol levels:   Maintain a healthy weight  Ask your healthcare provider what a healthy weight is for you  Ask him or her to help you create a weight loss plan if needed  Weight loss can decrease your total cholesterol and triglyceride levels  Weight loss may also help keep your blood pressure at a healthy level  Be physically active throughout the day  Physical activity, such as exercise, can help lower your total cholesterol level and maintain a healthy weight  Physical activity can also help increase your HDL cholesterol level  Work with your healthcare provider to create an program that is right for you  Get at least 30 to 40 minutes of moderate physical activity most days of the week  Examples of exercise include brisk walking, swimming, or biking  Also include strength training at least 2 times each week  Your healthcare providers can help you create a physical activity plan  Do not smoke  Nicotine and other chemicals in cigarettes and cigars can raise your cholesterol levels  Ask your healthcare provider for information if you currently smoke and need help to quit  E-cigarettes or smokeless tobacco still contain nicotine  Talk to your healthcare provider before you use these products  Limit or do not drink alcohol  Alcohol can increase your triglyceride levels  Ask your healthcare provider before you drink alcohol   Ask how much is okay for you to drink in 24 hours or 1 week     Follow up with your doctor as directed:  Write down your questions so you remember to ask them during your visits  © Copyright Brittni [employer] 2022 Information is for End User's use only and may not be sold, redistributed or otherwise used for commercial purposes  The above information is an  only  It is not intended as medical advice for individual conditions or treatments  Talk to your doctor, nurse or pharmacist before following any medical regimen to see if it is safe and effective for you

## 2023-03-03 NOTE — PROGRESS NOTES
Chief Complaint   Patient presents with   • Medicare Wellness Visit     Subsequent  • Follow-up     Routine overdue  Health Maintenance   Topic Date Due   • COVID-19 Vaccine (3 - Booster for Pfizer series) 05/03/2021   • Influenza Vaccine (1) 06/30/2023 (Originally 9/1/2022)   • Fall Risk  03/03/2024   • Depression Screening  03/03/2024   • Medicare Annual Wellness Visit (AWV)  03/03/2024   • BMI: Followup Plan  03/03/2024   • BMI: Adult  03/03/2024   • Hepatitis C Screening  Completed   • Pneumococcal Vaccine: 65+ Years  Completed   • HIB Vaccine  Aged Out   • IPV Vaccine  Aged Out   • Hepatitis A Vaccine  Aged Out   • Meningococcal ACWY Vaccine  Aged Out   • HPV Vaccine  Aged Out        Assessment and Plan:     Problem List Items Addressed This Visit        Cardiovascular and Mediastinum    Essential hypertension - Primary     Blood pressure elevated in the office today at 160/84  The patient states that he checked his pressure at home this morning and it was 130s over 80s  He checks his pressure several times per week and it is usually in that range  He has started riding his bike more outside since the weather is getting better  He continues on his amlodipine  Information provided to the patient regarding a low-sodium diet  He is due for blood work and I did remind him to have this blood work performed  He will see Heather Townsend back in the office in 6 months  Musculoskeletal and Integument    Arthritis       Other    Obesity (BMI 30 0-34  9)     Patient continues to exercise by walking his dog and riding his bike  He has gained about 12 pounds since last year  Dietary modifications discussed         Mixed hyperlipidemia     Patient continues on his Lipitor 10 mg daily  He is due for a lipid panel  Low-fat diet and weight loss recommended  He continues on his omega-3 fatty acids and aspirin as well  BMI Counseling: Body mass index is 35 09 kg/m²   The BMI is above normal  Nutrition recommendations include decreasing portion sizes, encouraging healthy choices of fruits and vegetables, limiting drinks that contain sugar, moderation in carbohydrate intake, increasing intake of lean protein, reducing intake of saturated and trans fat and reducing intake of cholesterol  Exercise recommendations include moderate physical activity 150 minutes/week and exercising 3-5 times per week  Rationale for BMI follow-up plan is due to patient being overweight or obese  Depression Screening and Follow-up Plan: Patient was screened for depression during today's encounter  They screened negative with a PHQ-2 score of 0  Preventive health issues were discussed with patient, and age appropriate screening tests were ordered as noted in patient's After Visit Summary  Personalized health advice and appropriate referrals for health education or preventive services given if needed, as noted in patient's After Visit Summary  History of Present Illness:     Patient presents for a Medicare Wellness Visit    Arnulfo Strong presents to the office today for his annual wellness visit and follow-up  Overall he feels well  He has no new medical complaints  His blood pressures continue to run high while in the office however the patient states at home his blood pressures are in the 130s over 80s range  Low-salt diet recommended  Low-fat diet recommended  Weight loss and exercise recommended  He is due for blood work and will have this done prior to his next visit in the office  He will continue to check his blood pressures at home  Patient Care Team:  YAQUELIN Tony as PCP - General (Family Medicine)     Review of Systems:     Review of Systems   Constitutional: Negative  Negative for fatigue  HENT: Negative  Negative for congestion, postnasal drip, rhinorrhea and trouble swallowing  Eyes: Negative  Negative for visual disturbance  Respiratory: Negative    Negative for choking and shortness of breath  Cardiovascular: Negative  Negative for chest pain  Gastrointestinal: Negative  Endocrine: Negative  Genitourinary: Negative  Musculoskeletal: Negative  Negative for arthralgias, back pain, myalgias and neck pain  Skin: Negative  Neurological: Negative for dizziness and headaches  Psychiatric/Behavioral: Negative  Problem List:     Patient Active Problem List   Diagnosis   • Essential hypertension   • Obesity (BMI 30 0-34  9)   • Former smoker   • Mixed hyperlipidemia   • Arthritis      Past Medical and Surgical History:     Past Medical History:   Diagnosis Date   • Back pain      History reviewed  No pertinent surgical history  Family History:     Family History   Problem Relation Age of Onset   • Cancer Mother    • Heart attack Father 48      Social History:     Social History     Socioeconomic History   • Marital status: /Civil Union     Spouse name: None   • Number of children: None   • Years of education: None   • Highest education level: None   Occupational History   • None   Tobacco Use   • Smoking status: Former     Packs/day: 1 00     Types: Cigarettes     Start date: 11/10/1956     Quit date: 12/3/2019     Years since quitting: 3 2     Passive exposure: Never   • Smokeless tobacco: Never   Substance and Sexual Activity   • Alcohol use: Yes   • Drug use: Never   • Sexual activity: Yes     Partners: Female   Other Topics Concern   • None   Social History Narrative   • None     Social Determinants of Health     Financial Resource Strain: Low Risk    • Difficulty of Paying Living Expenses: Not hard at all   Food Insecurity: Not on file   Transportation Needs: No Transportation Needs   • Lack of Transportation (Medical): No   • Lack of Transportation (Non-Medical):  No   Physical Activity: Not on file   Stress: Not on file   Social Connections: Not on file   Intimate Partner Violence: Not on file   Housing Stability: Not on file      Medications and Allergies: Current Outpatient Medications   Medication Sig Dispense Refill   • Omega-3 Fatty Acids (FISH OIL) 1,000 mg Take 1,000 mg by mouth daily     • amLODIPine (NORVASC) 5 mg tablet TAKE 1 TABLET BY MOUTH TWICE A DAY 60 tablet 0   • aspirin (ECOTRIN LOW STRENGTH) 81 mg EC tablet Take 81 mg by mouth daily     • atorvastatin (LIPITOR) 10 mg tablet TAKE 1 TABLET BY MOUTH EVERY DAY 90 tablet 1   • fluticasone (FLONASE) 50 mcg/act nasal spray 2 sprays into each nostril daily 1 Bottle 5     No current facility-administered medications for this visit  Allergies   Allergen Reactions   • Lisinopril Cough      Immunizations:     Immunization History   Administered Date(s) Administered   • COVID-19 PFIZER VACCINE 0 3 ML IM 02/17/2021, 03/08/2021   • Pneumococcal Conjugate 13-Valent 12/16/2019   • Pneumococcal Polysaccharide PPV23 02/24/2022      Health Maintenance:         Topic Date Due   • Hepatitis C Screening  Completed         Topic Date Due   • COVID-19 Vaccine (3 - Booster for Pfizer series) 05/03/2021      Medicare Screening Tests and Risk Assessments:     Lizandro Rodriguez is here for his Subsequent Wellness visit  Last Medicare Wellness visit information reviewed, patient interviewed, no change since last AWV  Health Risk Assessment:   Patient rates overall health as excellent  Patient feels that their physical health rating is much better  Patient is satisfied with their life  Eyesight was rated as same  Hearing was rated as same  Patient feels that their emotional and mental health rating is same  Patients states they are never, rarely angry  Patient states they are never, rarely unusually tired/fatigued  Pain experienced in the last 7 days has been none  Patient states that he has experienced no weight loss or gain in last 6 months  Depression Screening:   PHQ-2 Score: 0      Fall Risk Screening:    In the past year, patient has experienced: no history of falling in past year      Home Safety:  Patient does not have trouble with stairs inside or outside of their home  Patient has working smoke alarms and has working carbon monoxide detector  Home safety hazards include: none  Nutrition:   Current diet is No Added Salt and Low Saturated Fat  Medications:   Patient is currently taking over-the-counter supplements  OTC medications include: see medication list  Patient is able to manage medications  Activities of Daily Living (ADLs)/Instrumental Activities of Daily Living (IADLs):   Walk and transfer into and out of bed and chair?: Yes  Dress and groom yourself?: Yes    Bathe or shower yourself?: Yes    Feed yourself? Yes  Do your laundry/housekeeping?: Yes  Manage your money, pay your bills and track your expenses?: Yes  Make your own meals?: Yes    Do your own shopping?: Yes    Previous Hospitalizations:   Any hospitalizations or ED visits within the last 12 months?: No      Advance Care Planning:     Durable POA for healthcare: Yes    Five wishes given: No      Comments: Son is durable POA    Cognitive Screening:   Provider or family/friend/caregiver concerned regarding cognition?: No    PREVENTIVE SCREENINGS      Cardiovascular Screening:    General: History Lipid Disorder    Due for: Lipid Panel      Diabetes Screening:     General: Screening Not Indicated      Colorectal Cancer Screening:     General: Screening Not Indicated      Prostate Cancer Screening:    General: Screening Not Indicated      Osteoporosis Screening:    General: Screening Not Indicated      Abdominal Aortic Aneurysm (AAA) Screening:    Risk factors include: tobacco use        Lung Cancer Screening:     General: Screening Not Indicated      Hepatitis C Screening:    General: Screening Current    Screening, Brief Intervention, and Referral to Treatment (SBIRT)    Screening  Typical number of drinks in a day: 0  Typical number of drinks in a week: 0  Interpretation: Low risk drinking behavior      Single Item Drug Screening:  How often have you used an illegal drug (including marijuana) or a prescription medication for non-medical reasons in the past year? never    Single Item Drug Screen Score: 0  Interpretation: Negative screen for possible drug use disorder    No results found  Physical Exam:     /84 (BP Location: Left arm, Patient Position: Sitting, Cuff Size: Adult)   Pulse 88   Temp 97 7 °F (36 5 °C) (Tympanic)   Resp 20   Ht 5' 8 5" (1 74 m)   Wt 106 kg (234 lb 3 2 oz)   BMI 35 09 kg/m²     Physical Exam  Vitals reviewed  Constitutional:       Appearance: Normal appearance  He is obese  HENT:      Head: Normocephalic and atraumatic  Right Ear: Tympanic membrane, ear canal and external ear normal       Left Ear: Tympanic membrane, ear canal and external ear normal       Nose: Nose normal       Mouth/Throat:      Mouth: Mucous membranes are moist    Eyes:      Extraocular Movements: Extraocular movements intact  Pupils: Pupils are equal, round, and reactive to light  Cardiovascular:      Rate and Rhythm: Normal rate and regular rhythm  Pulses: Normal pulses  Heart sounds: Normal heart sounds  Pulmonary:      Effort: Pulmonary effort is normal       Breath sounds: Normal breath sounds  Musculoskeletal:         General: Normal range of motion  Skin:     General: Skin is warm  Neurological:      General: No focal deficit present  Mental Status: He is alert and oriented to person, place, and time  Psychiatric:         Mood and Affect: Mood normal          Behavior: Behavior normal          Thought Content:  Thought content normal          Judgment: Judgment normal           YAQUELIN Simon

## 2023-03-03 NOTE — ASSESSMENT & PLAN NOTE
Patient continues on his Lipitor 10 mg daily  He is due for a lipid panel  Low-fat diet and weight loss recommended  He continues on his omega-3 fatty acids and aspirin as well

## 2023-03-14 DIAGNOSIS — I10 ESSENTIAL HYPERTENSION: ICD-10-CM

## 2023-03-14 RX ORDER — AMLODIPINE BESYLATE 5 MG/1
TABLET ORAL
Qty: 180 TABLET | Refills: 1 | Status: SHIPPED | OUTPATIENT
Start: 2023-03-14

## 2023-06-08 ENCOUNTER — VBI (OUTPATIENT)
Dept: ADMINISTRATIVE | Facility: OTHER | Age: 78
End: 2023-06-08

## 2023-08-16 DIAGNOSIS — E78.2 MIXED HYPERLIPIDEMIA: ICD-10-CM

## 2023-08-17 RX ORDER — ATORVASTATIN CALCIUM 10 MG/1
TABLET, FILM COATED ORAL
Qty: 90 TABLET | Refills: 1 | Status: SHIPPED | OUTPATIENT
Start: 2023-08-17

## 2023-09-01 ENCOUNTER — RA CDI HCC (OUTPATIENT)
Dept: OTHER | Facility: HOSPITAL | Age: 78
End: 2023-09-01

## 2023-09-01 NOTE — PROGRESS NOTES
[facility] [address] [facility] coding opportunities       Chart reviewed, no opportunity found: [address]        Patients Insurance     Medicare Insurance: [facility]

## 2023-09-09 DIAGNOSIS — I10 ESSENTIAL HYPERTENSION: ICD-10-CM

## 2023-09-11 RX ORDER — AMLODIPINE BESYLATE 5 MG/1
TABLET ORAL
Qty: 180 TABLET | Refills: 1 | Status: SHIPPED | OUTPATIENT
Start: 2023-09-11

## 2023-09-14 ENCOUNTER — OFFICE VISIT (OUTPATIENT)
Dept: FAMILY MEDICINE CLINIC | Facility: CLINIC | Age: 78
End: 2023-09-14
Payer: COMMERCIAL

## 2023-09-14 VITALS
BODY MASS INDEX: 34.87 KG/M2 | DIASTOLIC BLOOD PRESSURE: 90 MMHG | TEMPERATURE: 97.3 F | HEART RATE: 90 BPM | SYSTOLIC BLOOD PRESSURE: 132 MMHG | OXYGEN SATURATION: 93 % | RESPIRATION RATE: 16 BRPM | HEIGHT: 69 IN | WEIGHT: 235.4 LBS

## 2023-09-14 DIAGNOSIS — I10 ESSENTIAL HYPERTENSION: Primary | ICD-10-CM

## 2023-09-14 DIAGNOSIS — E78.2 MIXED HYPERLIPIDEMIA: ICD-10-CM

## 2023-09-14 DIAGNOSIS — E66.9 OBESITY (BMI 30.0-34.9): ICD-10-CM

## 2023-09-14 PROCEDURE — 99214 OFFICE O/P EST MOD 30 MIN: CPT | Performed by: NURSE PRACTITIONER

## 2023-09-19 NOTE — ASSESSMENT & PLAN NOTE
Pt stopped statin therapy after myalgias   Repeat lipid panel now  Consider trial of a different statin if indicated   Continue Omega 3  Follow a low fat/ low cholesterol diet, work on weight loss

## 2023-09-19 NOTE — PROGRESS NOTES
Name: Bola Nuñez      : 1945      MRN: [id number]  Encounter Provider: YAQUELIN Hernandez  Encounter Date: 2023   Encounter department: [facility] [address]     1. Essential hypertension  Assessment & Plan:  BP is stable   Continue Amlodipine  Pt will continue to monitor BP at home regularly and if BP is consistently >140/90, he will notify us  Follow a low sodium diet   Lab work as ordered     Orders:  -     CBC and differential; Future  -     Comprehensive metabolic panel; Future  -     TSH, 3rd generation with Free T4 reflex; Future    2. Obesity (BMI 30.0-34. 9)  Assessment & Plan:  Encouraged lifestyle modifications       3. Mixed hyperlipidemia  Assessment & Plan:  Pt stopped statin therapy after myalgias   Repeat lipid panel now  Consider trial of a different statin if indicated   Continue Omega 3  Follow a low fat/ low cholesterol diet, work on weight loss     Orders:  -     Lipid panel; Future      Depression Screening and Follow-up Plan: Patient was screened for depression during today's encounter. They screened negative with a PHQ-2 score of 0. Subjective     HPI     Pt presents by himself today for a routine follow up  Doing well overall, no acute concerns today  Still enjoys fishing and hunting  Spends lots of time with his grandkids     He didn't realize there was blood work to be completed for today's visit    Notes compliance with all meds except his statin-- notes he was having intense muscle aches. His 2 granddaughters are nurses and they suggested his stop taking the statin. Muscle aches resolved once statin was stopped     Feels well overall    Review of Systems   Constitutional: Negative for chills and fever. HENT: Negative for ear pain and sore throat. Eyes: Negative for pain and visual disturbance. Respiratory: Negative for cough and shortness of breath. Cardiovascular: Negative for chest pain and palpitations. Gastrointestinal: Negative for abdominal pain and vomiting. Genitourinary: Negative for dysuria and hematuria. Musculoskeletal: Negative for arthralgias and back pain. Skin: Negative for color change and rash. Neurological: Negative for seizures and syncope. Hematological: Negative for adenopathy. Does not bruise/bleed easily. All other systems reviewed and are negative. Past Medical History:   Diagnosis Date   • Back pain      History reviewed. No pertinent surgical history. Family History   Problem Relation Age of Onset   • Cancer Mother    • Heart attack Father 48     Social History     Socioeconomic History   • Marital status: /Civil Union     Spouse name: None   • Number of children: None   • Years of education: None   • Highest education level: None   Occupational History   • None   Tobacco Use   • Smoking status: Former     Packs/day: 1.00     Types: Cigarettes     Start date: 11/10/1956     Quit date: 12/3/2019     Years since quitting: 3.7     Passive exposure: Never   • Smokeless tobacco: Never   Substance and Sexual Activity   • Alcohol use: Yes   • Drug use: Never   • Sexual activity: Yes     Partners: Female   Other Topics Concern   • None   Social History Narrative   • None     Social Determinants of Health     Financial Resource Strain: Low Risk  (3/3/2023)    Overall Financial Resource Strain (CARDIA)    • Difficulty of Paying Living Expenses: Not hard at all   Food Insecurity: No Food Insecurity (12/10/2019)    Hunger Vital Sign    • Worried About Running Out of Food in the Last Year: Never true    • Ran Out of Food in the Last Year: Never true   Transportation Needs: No Transportation Needs (3/3/2023)    PRAPARE - Transportation    • Lack of Transportation (Medical): No    • Lack of Transportation (Non-Medical):  No   Physical Activity: Insufficiently Active (12/10/2019)    Exercise Vital Sign    • Days of Exercise per Week: 1 day    • Minutes of Exercise per Session: 30 min   Stress: No Stress Concern Present (12/10/2019)    [facility]    • Feeling of Stress : Not at all   Social Connections: Socially Integrated (12/10/2019)    Social Connection and Isolation Panel [NHANES]    • Frequency of Communication with Friends and Family: More than three times a week    • Frequency of Social Gatherings with Friends and Family: More than three times a week    • Attends Rastafarian Services: More than 4 times per year    • Active Member of Clubs or Organizations: Yes    • Attends Club or Organization Meetings: More than 4 times per year    • Marital Status:    Intimate Partner Violence: Not At Risk (12/10/2019)    Humiliation, Afraid, Rape, and Kick questionnaire    • Fear of Current or Ex-Partner: No    • Emotionally Abused: No    • Physically Abused: No    • Sexually Abused: No   Housing Stability: Not on file     Current Outpatient Medications on File Prior to Visit   Medication Sig   • amLODIPine (NORVASC) 5 mg tablet TAKE 1 TABLET BY MOUTH TWICE A DAY   • aspirin (ECOTRIN LOW STRENGTH) 81 mg EC tablet Take 81 mg by mouth daily   • fluticasone (FLONASE) 50 mcg/act nasal spray 2 sprays into each nostril daily   • Omega-3 Fatty Acids (FISH OIL) 1,000 mg Take 1,000 mg by mouth daily     Allergies   Allergen Reactions   • Lisinopril Cough     Immunization History   Administered Date(s) Administered   • COVID-19 PFIZER VACCINE 0.3 ML IM 02/17/2021, 03/08/2021   • Pneumococcal Conjugate 13-Valent 12/16/2019   • Pneumococcal Polysaccharide PPV23 02/24/2022       Objective     /90   Pulse 90   Temp (!) 97.3 °F (36.3 °C) (Temporal)   Resp 16   Ht 5' 8.5" (1.74 m)   Wt 107 kg (235 lb 6.4 oz)   SpO2 93%   BMI 35.27 kg/m²     Physical Exam  Constitutional:       General: He is not in acute distress. Appearance: He is well-developed. He is obese. He is not ill-appearing, toxic-appearing or diaphoretic.    HENT: Head: Normocephalic and atraumatic. Eyes:      Extraocular Movements: Extraocular movements intact. Conjunctiva/sclera: Conjunctivae normal.      Pupils: Pupils are equal, round, and reactive to light. Neck:      Thyroid: No thyromegaly. Cardiovascular:      Rate and Rhythm: Normal rate and regular rhythm. Heart sounds: Normal heart sounds. No murmur heard. Pulmonary:      Effort: Pulmonary effort is normal. No respiratory distress. Breath sounds: Normal breath sounds. No wheezing. Abdominal:      General: Bowel sounds are normal. There is no distension. Palpations: Abdomen is soft. Tenderness: There is no abdominal tenderness. Musculoskeletal:         General: Normal range of motion. Cervical back: Normal range of motion and neck supple. No rigidity or tenderness. Right lower leg: No edema. Left lower leg: No edema. Lymphadenopathy:      Cervical: No cervical adenopathy. Skin:     General: Skin is warm and dry. Neurological:      General: No focal deficit present. Mental Status: He is alert and oriented to person, place, and time. Psychiatric:         Mood and Affect: Mood normal.         Behavior: Behavior normal.         Thought Content:  Thought content normal.         Judgment: Judgment normal.       YAQUELIN Bradford

## 2023-09-19 NOTE — ASSESSMENT & PLAN NOTE
BP is stable   Continue Amlodipine  Pt will continue to monitor BP at home regularly and if BP is consistently >140/90, he will notify us  Follow a low sodium diet   Lab work as ordered

## 2023-09-28 ENCOUNTER — TELEPHONE (OUTPATIENT)
Dept: FAMILY MEDICINE CLINIC | Facility: CLINIC | Age: 78
End: 2023-09-28

## 2023-09-28 ENCOUNTER — APPOINTMENT (OUTPATIENT)
Dept: LAB | Facility: AMBULARY SURGERY CENTER | Age: 78
End: 2023-09-28
Payer: COMMERCIAL

## 2023-09-28 DIAGNOSIS — E78.2 MIXED HYPERLIPIDEMIA: ICD-10-CM

## 2023-09-28 DIAGNOSIS — I10 ESSENTIAL HYPERTENSION: ICD-10-CM

## 2023-09-28 LAB
ALBUMIN SERPL BCP-MCNC: 4.2 G/DL (ref 3.5–5)
ALP SERPL-CCNC: 94 U/L (ref 34–104)
ALT SERPL W P-5'-P-CCNC: 30 U/L (ref 7–52)
ANION GAP SERPL CALCULATED.3IONS-SCNC: 5 MMOL/L
AST SERPL W P-5'-P-CCNC: 20 U/L (ref 13–39)
BASOPHILS # BLD AUTO: 0.08 THOUSANDS/ÂΜL (ref 0–0.1)
BASOPHILS NFR BLD AUTO: 1 % (ref 0–1)
BILIRUB SERPL-MCNC: 0.5 MG/DL (ref 0.2–1)
BUN SERPL-MCNC: 20 MG/DL (ref 5–25)
CALCIUM SERPL-MCNC: 9.4 MG/DL (ref 8.4–10.2)
CHLORIDE SERPL-SCNC: 108 MMOL/L (ref 96–108)
CHOLEST SERPL-MCNC: 183 MG/DL
CO2 SERPL-SCNC: 26 MMOL/L (ref 21–32)
CREAT SERPL-MCNC: 0.96 MG/DL (ref 0.6–1.3)
EOSINOPHIL # BLD AUTO: 0.41 THOUSAND/ÂΜL (ref 0–0.61)
EOSINOPHIL NFR BLD AUTO: 5 % (ref 0–6)
ERYTHROCYTE [DISTWIDTH] IN BLOOD BY AUTOMATED COUNT: 12.9 % (ref 11.6–15.1)
GFR SERPL CREATININE-BSD FRML MDRD: 75 ML/MIN/1.73SQ M
GLUCOSE P FAST SERPL-MCNC: 96 MG/DL (ref 65–99)
HCT VFR BLD AUTO: 44.1 % (ref 36.5–49.3)
HDLC SERPL-MCNC: 30 MG/DL
HGB BLD-MCNC: 14.5 G/DL (ref 12–17)
IMM GRANULOCYTES # BLD AUTO: 0.03 THOUSAND/UL (ref 0–0.2)
IMM GRANULOCYTES NFR BLD AUTO: 0 % (ref 0–2)
LDLC SERPL CALC-MCNC: 118 MG/DL (ref 0–100)
LYMPHOCYTES # BLD AUTO: 1.54 THOUSANDS/ÂΜL (ref 0.6–4.47)
LYMPHOCYTES NFR BLD AUTO: 19 % (ref 14–44)
MCH RBC QN AUTO: 30.9 PG (ref 26.8–34.3)
MCHC RBC AUTO-ENTMCNC: 32.9 G/DL (ref 31.4–37.4)
MCV RBC AUTO: 94 FL (ref 82–98)
MONOCYTES # BLD AUTO: 0.8 THOUSAND/ÂΜL (ref 0.17–1.22)
MONOCYTES NFR BLD AUTO: 10 % (ref 4–12)
NEUTROPHILS # BLD AUTO: 5.23 THOUSANDS/ÂΜL (ref 1.85–7.62)
NEUTS SEG NFR BLD AUTO: 65 % (ref 43–75)
NONHDLC SERPL-MCNC: 153 MG/DL
NRBC BLD AUTO-RTO: 0 /100 WBCS
PLATELET # BLD AUTO: 337 THOUSANDS/UL (ref 149–390)
PMV BLD AUTO: 9.6 FL (ref 8.9–12.7)
POTASSIUM SERPL-SCNC: 3.9 MMOL/L (ref 3.5–5.3)
PROT SERPL-MCNC: 7.7 G/DL (ref 6.4–8.4)
RBC # BLD AUTO: 4.7 MILLION/UL (ref 3.88–5.62)
SODIUM SERPL-SCNC: 139 MMOL/L (ref 135–147)
TRIGL SERPL-MCNC: 176 MG/DL
TSH SERPL DL<=0.05 MIU/L-ACNC: 1.55 UIU/ML (ref 0.45–4.5)
WBC # BLD AUTO: 8.09 THOUSAND/UL (ref 4.31–10.16)

## 2023-09-28 PROCEDURE — 36415 COLL VENOUS BLD VENIPUNCTURE: CPT

## 2023-09-28 PROCEDURE — 80053 COMPREHEN METABOLIC PANEL: CPT

## 2023-09-28 PROCEDURE — 80061 LIPID PANEL: CPT

## 2023-09-28 PROCEDURE — 85025 COMPLETE CBC W/AUTO DIFF WBC: CPT

## 2023-09-28 PROCEDURE — 84443 ASSAY THYROID STIM HORMONE: CPT

## 2023-09-28 NOTE — TELEPHONE ENCOUNTER
Left detailed message----- Message from Tawanda Andrzej, [address] [facility] sent at 9/28/2023 12:24 PM EDT -----  Please call pt with lab results:  Thyroid function is normal  FBS is normal at 96  Electrolytes, kidney function and liver enzymes are normal  WBC, RBC are normal  Cholesterol is elevated overall. His 10 year risk of having a heart attack or stroke is very high at 38.6%--- I know he was on a Atorvastatin and he had side effects and stopped it. With this risk being so high, I would strongly recommend he try a different statin such as Crestor to help lower this risk. If he agrees, I can send it to his pharmacy. If he has questions, please let me know and I can call him. Thank you!

## 2024-02-02 ENCOUNTER — VBI (OUTPATIENT)
Dept: ADMINISTRATIVE | Facility: OTHER | Age: 79
End: 2024-02-02

## 2024-02-16 ENCOUNTER — TELEPHONE (OUTPATIENT)
Dept: FAMILY MEDICINE CLINIC | Facility: CLINIC | Age: 79
End: 2024-02-16

## 2024-02-16 NOTE — TELEPHONE ENCOUNTER
Patient ([phone]) called to report possible infection in gum in area where tooth was removed. Patient states area became irritated and swollen after eating steak. Believes area is infected. States that this has happened in the past and provider has sent antibiotic to treat. States dentist will not prescribe antibiotic- needs to be ordered by Primary care. Provider not in officer-  Asks if script could be sent to [facility] on [address]. Please confirm if order can be sent or contact patient with recommendations.

## 2024-03-11 DIAGNOSIS — I10 ESSENTIAL HYPERTENSION: ICD-10-CM

## 2024-03-11 RX ORDER — AMLODIPINE BESYLATE 5 MG/1
TABLET ORAL
Qty: 180 TABLET | Refills: 1 | Status: SHIPPED | OUTPATIENT
Start: 2024-03-11

## 2024-03-21 ENCOUNTER — OFFICE VISIT (OUTPATIENT)
Dept: FAMILY MEDICINE CLINIC | Facility: CLINIC | Age: 79
End: 2024-03-21
Payer: COMMERCIAL

## 2024-03-21 VITALS
BODY MASS INDEX: 34.33 KG/M2 | WEIGHT: 231.8 LBS | HEART RATE: 83 BPM | SYSTOLIC BLOOD PRESSURE: 138 MMHG | HEIGHT: 69 IN | DIASTOLIC BLOOD PRESSURE: 86 MMHG | OXYGEN SATURATION: 96 % | RESPIRATION RATE: 18 BRPM | TEMPERATURE: 96.8 F

## 2024-03-21 DIAGNOSIS — M25.471 ANKLE EDEMA, BILATERAL: ICD-10-CM

## 2024-03-21 DIAGNOSIS — M25.472 ANKLE EDEMA, BILATERAL: ICD-10-CM

## 2024-03-21 DIAGNOSIS — E66.9 OBESITY (BMI 30.0-34.9): ICD-10-CM

## 2024-03-21 DIAGNOSIS — E78.2 MIXED HYPERLIPIDEMIA: ICD-10-CM

## 2024-03-21 DIAGNOSIS — Z00.00 MEDICARE ANNUAL WELLNESS VISIT, SUBSEQUENT: ICD-10-CM

## 2024-03-21 DIAGNOSIS — R06.02 SOB (SHORTNESS OF BREATH) ON EXERTION: ICD-10-CM

## 2024-03-21 DIAGNOSIS — I10 ESSENTIAL HYPERTENSION: Primary | ICD-10-CM

## 2024-03-21 PROCEDURE — G0439 PPPS, SUBSEQ VISIT: HCPCS | Performed by: NURSE PRACTITIONER

## 2024-03-21 PROCEDURE — 99214 OFFICE O/P EST MOD 30 MIN: CPT | Performed by: NURSE PRACTITIONER

## 2024-03-21 NOTE — PATIENT INSTRUCTIONS
Schedule stress test and echo (ultrasound of the hear) ([phone])  Have lab work done  Compression stockings for legs   Watch your salt intake! Elevate legs during the day  Call me with any worsening leg/ankle swelling OR if you are having more leg pain/cramping, any skin color changes to the legs, or heaviness in legs (we would then do ultrasounds of your legs to look at the blood flow)          Medicare Preventive Visit Patient Instructions  Thank you for completing your Welcome to Medicare Visit or Medicare Annual Wellness Visit today. Your next wellness visit will be due in one year (3/22/2025).  The screening/preventive services that you may require over the next 5-10 years are detailed below. Some tests may not apply to you based off risk factors and/or age. Screening tests ordered at today's visit but not completed yet may show as past due. Also, please note that scanned in results may not display below.  Preventive Screenings:  Service Recommendations Previous Testing/Comments   Colorectal Cancer Screening  Colonoscopy    Fecal Occult Blood Test (FOBT)/Fecal Immunochemical Test (FIT)  Fecal DNA/Cologuard Test  Flexible Sigmoidoscopy Age: 45-75 years old   Colonoscopy: every 10 years (May be performed more frequently if at higher risk)  OR  FOBT/FIT: every 1 year  OR  Cologuard: every 3 years  OR  Sigmoidoscopy: every 5 years  Screening may be recommended earlier than age 45 if at higher risk for colorectal cancer. Also, an individualized decision between you and your healthcare provider will decide whether screening between the ages of 76-85 would be appropriate. Colonoscopy: Not on file  FOBT/FIT: Not on file  Cologuard: Not on file  Sigmoidoscopy: Not on file          Prostate Cancer Screening Individualized decision between patient and health care provider in men between ages of 55-69   Medicare will cover every 12 months beginning on the day after your 50th birthday PSA: No results in last 5 years      Screening Not Indicated     Hepatitis C Screening Once for adults born between 1945 and 1965  More frequently in patients at high risk for Hepatitis C Hep C Antibody: 02/28/2022    Screening Current   Diabetes Screening 1-2 times per year if you're at risk for diabetes or have pre-diabetes Fasting glucose: 96 mg/dL (9/28/2023)  A1C: 5.3 % (2/28/2022)  Screening Current   Cholesterol Screening Once every 5 years if you don't have a lipid disorder. May order more often based on risk factors. Lipid panel: 09/28/2023  Screening Not Indicated  History Lipid Disorder      Other Preventive Screenings Covered by Medicare:  Abdominal Aortic Aneurysm (AAA) Screening: covered once if your at risk. You're considered to be at risk if you have a family history of AAA or a male between the age of 65-75 who smoking at least 100 cigarettes in your lifetime.  Lung Cancer Screening: covers low dose CT scan once per year if you meet all of the following conditions: (1) Age 55-77; (2) No signs or symptoms of lung cancer; (3) Current smoker or have quit smoking within the last 15 years; (4) You have a tobacco smoking history of at least 20 pack years (packs per day x number of years you smoked); (5) You get a written order from a healthcare provider.  Glaucoma Screening: covered annually if you're considered high risk: (1) You have diabetes OR (2) Family history of glaucoma OR (3)  aged 50 and older OR (4)  American aged 65 and older  Osteoporosis Screening: covered every 2 years if you meet one of the following conditions: (1) Have a vertebral abnormality; (2) On glucocorticoid therapy for more than 3 months; (3) Have primary hyperparathyroidism; (4) On osteoporosis medications and need to assess response to drug therapy.  HIV Screening: covered annually if you're between the age of 15-65. Also covered annually if you are younger than 15 and older than 65 with risk factors for HIV infection. For pregnant  patients, it is covered up to 3 times per pregnancy.    Immunizations:  Immunization Recommendations   Influenza Vaccine Annual influenza vaccination during flu season is recommended for all persons aged >= 6 months who do not have contraindications   Pneumococcal Vaccine   * Pneumococcal conjugate vaccine = PCV13 (Prevnar 13), PCV15 (Vaxneuvance), PCV20 (Prevnar 20)  * Pneumococcal polysaccharide vaccine = PPSV23 (Pneumovax) Adults 19-65 yo with certain risk factors or if 65+ yo  If never received any pneumonia vaccine: recommend Prevnar 20 (PCV20)  Give PCV20 if previously received 1 dose of PCV13 or PPSV23   Hepatitis B Vaccine 3 dose series if at intermediate or high risk (ex: diabetes, end stage renal disease, liver disease)   Respiratory syncytial virus (RSV) Vaccine - COVERED BY MEDICARE PART D  * RSVPreF3 (Arexvy) CDC recommends that adults 60 years of age and older may receive a single dose of RSV vaccine using shared clinical decision-making (SCDM)   Tetanus (Td) Vaccine - COST NOT COVERED BY MEDICARE PART B Following completion of primary series, a booster dose should be given every 10 years to maintain immunity against tetanus. Td may also be given as tetanus wound prophylaxis.   Tdap Vaccine - COST NOT COVERED BY MEDICARE PART B Recommended at least once for all adults. For pregnant patients, recommended with each pregnancy.   Shingles Vaccine (Shingrix) - COST NOT COVERED BY MEDICARE PART B  2 shot series recommended in those 19 years and older who have or will have weakened immune systems or those 50 years and older     Health Maintenance Due:      Topic Date Due    Lung Cancer Screening  Never done    Hepatitis C Screening  Completed     Immunizations Due:      Topic Date Due    Influenza Vaccine (1) Never done    COVID-19 Vaccine (3 - 2023-24 season) 09/01/2023     Advance Directives   What are advance directives?  Advance directives are legal documents that state your wishes and plans for medical  care. These plans are made ahead of time in case you lose your ability to make decisions for yourself. Advance directives can apply to any medical decision, such as the treatments you want, and if you want to donate organs.   What are the types of advance directives?  There are many types of advance directives, and each state has rules about how to use them. You may choose a combination of any of the following:  Living will:  This is a written record of the treatment you want. You can also choose which treatments you do not want, which to limit, and which to stop at a certain time. This includes surgery, medicine, IV fluid, and tube feedings.   Durable power of  for healthcare (DPAHC):  This is a written record that states who you want to make healthcare choices for you when you are unable to make them for yourself. This person, called a proxy, is usually a family member or a friend. You may choose more than 1 proxy.  Do not resuscitate (DNR) order:  A DNR order is used in case your heart stops beating or you stop breathing. It is a request not to have certain forms of treatment, such as CPR. A DNR order may be included in other types of advance directives.  Medical directive:  This covers the care that you want if you are in a coma, near death, or unable to make decisions for yourself. You can list the treatments you want for each condition. Treatment may include pain medicine, surgery, blood transfusions, dialysis, IV or tube feedings, and a ventilator (breathing machine).  Values history:  This document has questions about your views, beliefs, and how you feel and think about life. This information can help others choose the care that you would choose.  Why are advance directives important?  An advance directive helps you control your care. Although spoken wishes may be used, it is better to have your wishes written down. Spoken wishes can be misunderstood, or not followed. Treatments may be given even if  you do not want them. An advance directive may make it easier for your family to make difficult choices about your care.   Weight Management   Why it is important to manage your weight:  Being overweight increases your risk of health conditions such as heart disease, high blood pressure, type 2 diabetes, and certain types of cancer. It can also increase your risk for osteoarthritis, sleep apnea, and other respiratory problems. Aim for a slow, steady weight loss. Even a small amount of weight loss can lower your risk of health problems.  How to lose weight safely:  A safe and healthy way to lose weight is to eat fewer calories and get regular exercise. You can lose up about 1 pound a week by decreasing the number of calories you eat by 500 calories each day.   Healthy meal plan for weight management:  A healthy meal plan includes a variety of foods, contains fewer calories, and helps you stay healthy. A healthy meal plan includes the following:  Eat whole-grain foods more often.  A healthy meal plan should contain fiber. Fiber is the part of grains, fruits, and vegetables that is not broken down by your body. Whole-grain foods are healthy and provide extra fiber in your diet. Some examples of whole-grain foods are whole-wheat breads and pastas, oatmeal, brown rice, and bulgur.  Eat a variety of vegetables every day.  Include dark, leafy greens such as spinach, kale, bertram greens, and mustard greens. Eat yellow and orange vegetables such as carrots, sweet potatoes, and winter squash.   Eat a variety of fruits every day.  Choose fresh or canned fruit (canned in its own juice or light syrup) instead of juice. Fruit juice has very little or no fiber.  Eat low-fat dairy foods.  Drink fat-free (skim) milk or 1% milk. Eat fat-free yogurt and low-fat cottage cheese. Try low-fat cheeses such as mozzarella and other reduced-fat cheeses.  Choose meat and other protein foods that are low in fat.  Choose beans or other legumes  such as split peas or lentils. Choose fish, skinless poultry (chicken or turkey), or lean cuts of red meat (beef or pork). Before you cook meat or poultry, cut off any visible fat.   Use less fat and oil.  Try baking foods instead of frying them. Add less fat, such as margarine, sour cream, regular salad dressing and mayonnaise to foods. Eat fewer high-fat foods. Some examples of high-fat foods include french fries, doughnuts, ice cream, and cakes.  Eat fewer sweets.  Limit foods and drinks that are high in sugar. This includes candy, cookies, regular soda, and sweetened drinks.  Exercise:  Exercise at least 30 minutes per day on most days of the week. Some examples of exercise include walking, biking, dancing, and swimming. You can also fit in more physical activity by taking the stairs instead of the elevator or parking farther away from stores. Ask your healthcare provider about the best exercise plan for you.      © Copyright [employer] 2018 Information is for End User's use only and may not be sold, redistributed or otherwise used for commercial purposes. All illustrations and images included in CareNotes® are the copyrighted property of [employer] or [employer]

## 2024-03-21 NOTE — PROGRESS NOTES
Assessment and Plan:     Problem List Items Addressed This Visit          Cardiovascular and Mediastinum    Essential hypertension - Primary     BP is stable   Continue Amlodipine 5 mg daily   Follow a low salt diet  Lab work ordered today          Relevant Orders    CBC and differential    Comprehensive metabolic panel    NM myocardial perfusion spect (rx stress and/or rest)    Echo complete w/ contrast if indicated       Surgery/Wound/Pain    Ankle edema, bilateral     New issue, started over the past few months   He is on Amlodipine and we discussed this can be a side effect of this med  He is going to start wearing compression stockings, elevate legs and follow a low sodium diet  He mentioned leg heaviness on occasion and we did discuss doing some vascular studies-- he would like to have stress test/echo done first (noted separately)   We can also consider adding on HCTZ 12.5 mg if needed         Relevant Orders    Compression Stocking    Echo complete w/ contrast if indicated       Other    Obesity (BMI 30.0-34.9)     Encouraged lifestyle modifications          Mixed hyperlipidemia     Pt stopped statin therapy after myalgias   ASCVD risk at 42.9% which we discussed today  Pt is also now having some SOB on exertion   A stress test was ordered today   Repeat lab work ordered   Pt has no interest in trying a different statin therapy   Continue Omega 3  Follow a low fat/ low cholesterol diet, work on weight loss          Relevant Orders    Lipid panel    TSH, 3rd generation with Free T4 reflex    NM myocardial perfusion spect (rx stress and/or rest)    Echo complete w/ contrast if indicated     Other Visit Diagnoses       Medicare annual wellness visit, subsequent        SOB (shortness of breath) on exertion        Relevant Orders    NM myocardial perfusion spect (rx stress and/or rest)    Echo complete w/ contrast if indicated          Pt with new SOB on exertion and fatigue on exertion over the past few months.   Also now having some edema of the ankles bilaterally. No SOB at rest.  No cough, orthopnea.  ASCVD risk at 42.9%.  Intolerant of statin therapy.   On Amlodipine for HTN.  Stress test and echo ordered to r/o cardiac disease.  ED precautions also reviewed with patient       Depression Screening and Follow-up Plan: Patient was screened for depression during today's encounter. They screened negative with a PHQ-2 score of 0.      Preventive health issues were discussed with patient, and age appropriate screening tests were ordered as noted in patient's After Visit Summary.  Personalized health advice and appropriate referrals for health education or preventive services given if needed, as noted in patient's After Visit Summary.     History of Present Illness:     Patient presents for a Medicare Wellness Visit    HPI     Pt presents by himself today for a routine follow up and AWV    Ray notes he has been feeling more fatigued and somewhat SOB with exertion over the past few months.  He notices he is unable to move around like he used to due to these symptoms.  He denies CP.  Current ASCVD risk at 42.9%.  He has been intolerant of statin therapy in the past and has declined any other statins.  He is taking Amlodipine 5 mg daily.  Checks his BP every 3 days or so- typically in the 130s/80s.  He also notes having some ankle swelling over the past few months which is new for him.  He does try to watch his salt intake.  Does not use compression stockings.  Denies coughing, orthopnea or SOB at rest, palpitations       Patient Care Team:  YAQUELIN Ac as PCP - General (Family Medicine)     Review of Systems:     Review of Systems   Constitutional:  Negative for activity change, appetite change, chills, diaphoresis, fatigue, fever and unexpected weight change.   Eyes:  Negative for visual disturbance.   Respiratory:  Positive for shortness of breath. Negative for cough, chest tightness and wheezing.     Cardiovascular:  Negative for chest pain, palpitations and leg swelling.   Gastrointestinal:  Negative for abdominal pain, blood in stool, constipation, diarrhea and nausea.   Genitourinary:  Negative for dysuria.   Musculoskeletal:  Negative for arthralgias and myalgias.   Skin:  Negative for rash and wound.   Neurological:  Negative for dizziness, weakness, numbness and headaches.   Hematological:  Negative for adenopathy. Does not bruise/bleed easily.   Psychiatric/Behavioral:  Negative for dysphoric mood, self-injury, sleep disturbance and suicidal ideas. The patient is not nervous/anxious.         Problem List:     Patient Active Problem List   Diagnosis    Essential hypertension    Obesity (BMI 30.0-34.9)    Former smoker    Mixed hyperlipidemia    Arthritis    Ankle edema, bilateral      Past Medical and Surgical History:     Past Medical History:   Diagnosis Date    Back pain      History reviewed. No pertinent surgical history.   Family History:     Family History   Problem Relation Age of Onset    Cancer Mother     Heart attack Father 50      Social History:     Social History     Socioeconomic History    Marital status: /Civil Union     Spouse name: None    Number of children: None    Years of education: None    Highest education level: None   Occupational History    None   Tobacco Use    Smoking status: Former     Current packs/day: 0.00     Average packs/day: 1 pack/day for 63.1 years (63.1 ttl pk-yrs)     Types: Cigarettes     Start date: 11/10/1956     Quit date: 12/3/2019     Years since quittin.3     Passive exposure: Past    Smokeless tobacco: Never   Vaping Use    Vaping status: Never Used   Substance and Sexual Activity    Alcohol use: Yes    Drug use: Never    Sexual activity: Yes     Partners: Female   Other Topics Concern    None   Social History Narrative    None     Social Determinants of Health     Financial Resource Strain: Low Risk  (3/3/2023)    Overall Financial Resource  Strain (CARDIA)     Difficulty of Paying Living Expenses: Not hard at all   Food Insecurity: No Food Insecurity (3/21/2024)    Hunger Vital Sign     Worried About Running Out of Food in the Last Year: Never true     Ran Out of Food in the Last Year: Never true   Transportation Needs: No Transportation Needs (3/21/2024)    PRAPARE - Transportation     Lack of Transportation (Medical): No     Lack of Transportation (Non-Medical): No   Physical Activity: Insufficiently Active (12/10/2019)    Exercise Vital Sign     Days of Exercise per Week: 1 day     Minutes of Exercise per Session: 30 min   Stress: No Stress Concern Present (12/10/2019)    [employer] [place] [employer] - Occupational Stress Questionnaire     Feeling of Stress : Not at all   Social Connections: Socially Integrated (12/10/2019)    Social Connection and Isolation Panel [NHANES]     Frequency of Communication with Friends and Family: More than three times a week     Frequency of Social Gatherings with Friends and Family: More than three times a week     Attends Yazdanism Services: More than 4 times per year     Active Member of Clubs or Organizations: Yes     Attends Club or Organization Meetings: More than 4 times per year     Marital Status:    Intimate Partner Violence: Not At Risk (12/10/2019)    Humiliation, Afraid, Rape, and Kick questionnaire     Fear of Current or Ex-Partner: No     Emotionally Abused: No     Physically Abused: No     Sexually Abused: No   Housing Stability: Low Risk  (3/21/2024)    Housing Stability Vital Sign     Unable to Pay for Housing in the Last Year: No     Number of Places Lived in the Last Year: 1     Unstable Housing in the Last Year: No      Medications and Allergies:     Current Outpatient Medications   Medication Sig Dispense Refill    amLODIPine (NORVASC) 5 mg tablet TAKE 1 TABLET BY MOUTH TWICE A  tablet 1    aspirin (ECOTRIN LOW STRENGTH) 81 mg EC tablet Take 81 mg by mouth daily       Omega-3 Fatty Acids (FISH OIL) 1,000 mg Take 1,000 mg by mouth daily      fluticasone (FLONASE) 50 mcg/act nasal spray 2 sprays into each nostril daily (Patient not taking: Reported on 3/21/2024) 1 Bottle 5     No current facility-administered medications for this visit.     Allergies   Allergen Reactions    Lisinopril Cough      Immunizations:     Immunization History   Administered Date(s) Administered    COVID-19 PFIZER VACCINE 0.3 ML IM 02/17/2021, 03/08/2021    Pneumococcal Conjugate 13-Valent 12/16/2019    Pneumococcal Polysaccharide PPV23 02/24/2022      Health Maintenance:         Topic Date Due    Lung Cancer Screening  Never done    Hepatitis C Screening  Completed         Topic Date Due    Influenza Vaccine (1) Never done    COVID-19 Vaccine (3 - 2023-24 season) 09/01/2023      Medicare Screening Tests and Risk Assessments:     Ranulfo is here for his Subsequent Wellness visit. Last Medicare Wellness visit information reviewed, patient interviewed, no change since last AWV.     Health Risk Assessment:   Patient rates overall health as excellent. Patient feels that their physical health rating is same. Patient is very satisfied with their life. Eyesight was rated as same. Hearing was rated as same. Patient feels that their emotional and mental health rating is same. Patients states they are never, rarely angry. Patient states they are never, rarely unusually tired/fatigued. Pain experienced in the last 7 days has been some. Patient's pain rating has been 3/10. Patient states that he has experienced no weight loss or gain in last 6 months.     Depression Screening:   PHQ-2 Score: 0      Fall Risk Screening:   In the past year, patient has experienced: no history of falling in past year      Home Safety:  Patient does not have trouble with stairs inside or outside of their home. Patient has working smoke alarms and has working carbon monoxide detector. Home safety hazards include: none.     Nutrition:    Current diet is Regular.     Medications:   Patient is currently taking over-the-counter supplements. OTC medications include: see medication list. Patient is able to manage medications.     Activities of Daily Living (ADLs)/Instrumental Activities of Daily Living (IADLs):   Walk and transfer into and out of bed and chair?: Yes  Dress and groom yourself?: Yes    Bathe or shower yourself?: Yes    Feed yourself? Yes  Do your laundry/housekeeping?: Yes  Manage your money, pay your bills and track your expenses?: Yes  Make your own meals?: Yes    Do your own shopping?: Yes    Previous Hospitalizations:   Any hospitalizations or ED visits within the last 12 months?: No      Advance Care Planning:   Living will: Yes    Durable POA for healthcare: Yes    Advanced directive: Yes      Cognitive Screening:   Provider or family/friend/caregiver concerned regarding cognition?: No    PREVENTIVE SCREENINGS      Cardiovascular Screening:    General: History Lipid Disorder and Screening Current      Diabetes Screening:     General: Screening Current      Colorectal Cancer Screening:     General: Screening Not Indicated      Prostate Cancer Screening:    General: Screening Not Indicated      Osteoporosis Screening:    General: Patient Declines      Abdominal Aortic Aneurysm (AAA) Screening:    Risk factors include: tobacco use        General: Screening Not Indicated      Lung Cancer Screening:     General: Screening Not Indicated      Hepatitis C Screening:    General: Screening Current    Screening, Brief Intervention, and Referral to Treatment (SBIRT)    Screening  Typical number of drinks in a day: 0  Typical number of drinks in a week: 0  Interpretation: Low risk drinking behavior.    Single Item Drug Screening:  How often have you used an illegal drug (including marijuana) or a prescription medication for non-medical reasons in the past year? never    Single Item Drug Screen Score: 0  Interpretation: Negative screen for  "possible drug use disorder    No results found.     Physical Exam:     /86   Pulse 83   Temp (!) 96.8 °F (36 °C) (Tympanic)   Resp 18   Ht 5' 8.5\" (1.74 m)   Wt 105 kg (231 lb 12.8 oz)   SpO2 96%   BMI 34.73 kg/m²     Physical Exam  Vitals reviewed.   Constitutional:       General: He is not in acute distress.     Appearance: Normal appearance. He is obese. He is not ill-appearing, toxic-appearing or diaphoretic.   HENT:      Head: Normocephalic and atraumatic.      Right Ear: Tympanic membrane normal.      Left Ear: Tympanic membrane normal.      Nose: Nose normal.      Mouth/Throat:      Mouth: Mucous membranes are moist.      Pharynx: Oropharynx is clear.   Eyes:      Extraocular Movements: Extraocular movements intact.      Conjunctiva/sclera: Conjunctivae normal.      Pupils: Pupils are equal, round, and reactive to light.   Neck:      Vascular: No carotid bruit.   Cardiovascular:      Rate and Rhythm: Normal rate and regular rhythm.      Pulses: Normal pulses.      Heart sounds: Normal heart sounds. No murmur heard.  Pulmonary:      Effort: Pulmonary effort is normal. No respiratory distress.      Breath sounds: Normal breath sounds. No wheezing.   Abdominal:      General: Bowel sounds are normal. There is no distension.      Palpations: Abdomen is soft.      Tenderness: There is no abdominal tenderness.   Musculoskeletal:         General: Normal range of motion.      Cervical back: Normal range of motion and neck supple. No rigidity or tenderness. No muscular tenderness.      Right lower le+ Edema present.      Left lower le+ Edema present.   Lymphadenopathy:      Cervical: No cervical adenopathy.   Skin:     General: Skin is warm and dry.      Capillary Refill: Capillary refill takes less than 2 seconds.      Findings: No bruising, erythema or rash.   Neurological:      General: No focal deficit present.      Mental Status: He is alert and oriented to person, place, and time.      " Cranial Nerves: No cranial nerve deficit.   Psychiatric:         Mood and Affect: Mood normal.         Behavior: Behavior normal.         Thought Content: Thought content normal.         Judgment: Judgment normal.          YAQUELIN Ac

## 2024-03-21 NOTE — ASSESSMENT & PLAN NOTE
Pt stopped statin therapy after myalgias   ASCVD risk at 42.9% which we discussed today  Pt is also now having some SOB on exertion   A stress test was ordered today   Repeat lab work ordered   Pt has no interest in trying a different statin therapy   Continue Omega 3  Follow a low fat/ low cholesterol diet, work on weight loss

## 2024-03-21 NOTE — ASSESSMENT & PLAN NOTE
New issue, started over the past few months   He is on Amlodipine and we discussed this can be a side effect of this med  He is going to start wearing compression stockings, elevate legs and follow a low sodium diet  He mentioned leg heaviness on occasion and we did discuss doing some vascular studies-- he would like to have stress test/echo done first (noted separately)   We can also consider adding on HCTZ 12.5 mg if needed

## 2024-03-26 ENCOUNTER — APPOINTMENT (OUTPATIENT)
Dept: LAB | Facility: AMBULARY SURGERY CENTER | Age: 79
End: 2024-03-26
Payer: COMMERCIAL

## 2024-03-26 DIAGNOSIS — I10 ESSENTIAL HYPERTENSION: ICD-10-CM

## 2024-03-26 DIAGNOSIS — E78.2 MIXED HYPERLIPIDEMIA: ICD-10-CM

## 2024-03-26 LAB
ALBUMIN SERPL BCP-MCNC: 4.1 G/DL (ref 3.5–5)
ALP SERPL-CCNC: 105 U/L (ref 34–104)
ALT SERPL W P-5'-P-CCNC: 41 U/L (ref 7–52)
ANION GAP SERPL CALCULATED.3IONS-SCNC: 9 MMOL/L (ref 4–13)
AST SERPL W P-5'-P-CCNC: 31 U/L (ref 13–39)
BASOPHILS # BLD AUTO: 0.14 THOUSANDS/ÂΜL (ref 0–0.1)
BASOPHILS NFR BLD AUTO: 2 % (ref 0–1)
BILIRUB SERPL-MCNC: 0.51 MG/DL (ref 0.2–1)
BUN SERPL-MCNC: 13 MG/DL (ref 5–25)
CALCIUM SERPL-MCNC: 8.8 MG/DL (ref 8.4–10.2)
CHLORIDE SERPL-SCNC: 107 MMOL/L (ref 96–108)
CHOLEST SERPL-MCNC: 189 MG/DL
CO2 SERPL-SCNC: 24 MMOL/L (ref 21–32)
CREAT SERPL-MCNC: 0.98 MG/DL (ref 0.6–1.3)
EOSINOPHIL # BLD AUTO: 0.45 THOUSAND/ÂΜL (ref 0–0.61)
EOSINOPHIL NFR BLD AUTO: 5 % (ref 0–6)
ERYTHROCYTE [DISTWIDTH] IN BLOOD BY AUTOMATED COUNT: 13 % (ref 11.6–15.1)
GFR SERPL CREATININE-BSD FRML MDRD: 73 ML/MIN/1.73SQ M
GLUCOSE P FAST SERPL-MCNC: 96 MG/DL (ref 65–99)
HCT VFR BLD AUTO: 45 % (ref 36.5–49.3)
HDLC SERPL-MCNC: 31 MG/DL
HGB BLD-MCNC: 15 G/DL (ref 12–17)
IMM GRANULOCYTES # BLD AUTO: 0.03 THOUSAND/UL (ref 0–0.2)
IMM GRANULOCYTES NFR BLD AUTO: 0 % (ref 0–2)
LDLC SERPL CALC-MCNC: 119 MG/DL (ref 0–100)
LYMPHOCYTES # BLD AUTO: 1.66 THOUSANDS/ÂΜL (ref 0.6–4.47)
LYMPHOCYTES NFR BLD AUTO: 19 % (ref 14–44)
MCH RBC QN AUTO: 31.4 PG (ref 26.8–34.3)
MCHC RBC AUTO-ENTMCNC: 33.3 G/DL (ref 31.4–37.4)
MCV RBC AUTO: 94 FL (ref 82–98)
MONOCYTES # BLD AUTO: 0.87 THOUSAND/ÂΜL (ref 0.17–1.22)
MONOCYTES NFR BLD AUTO: 10 % (ref 4–12)
NEUTROPHILS # BLD AUTO: 5.81 THOUSANDS/ÂΜL (ref 1.85–7.62)
NEUTS SEG NFR BLD AUTO: 64 % (ref 43–75)
NONHDLC SERPL-MCNC: 158 MG/DL
NRBC BLD AUTO-RTO: 0 /100 WBCS
PLATELET # BLD AUTO: 317 THOUSANDS/UL (ref 149–390)
PMV BLD AUTO: 9.8 FL (ref 8.9–12.7)
POTASSIUM SERPL-SCNC: 4 MMOL/L (ref 3.5–5.3)
PROT SERPL-MCNC: 7.4 G/DL (ref 6.4–8.4)
RBC # BLD AUTO: 4.77 MILLION/UL (ref 3.88–5.62)
SODIUM SERPL-SCNC: 140 MMOL/L (ref 135–147)
TRIGL SERPL-MCNC: 193 MG/DL
TSH SERPL DL<=0.05 MIU/L-ACNC: 1.8 UIU/ML (ref 0.45–4.5)
WBC # BLD AUTO: 8.96 THOUSAND/UL (ref 4.31–10.16)

## 2024-03-26 PROCEDURE — 84443 ASSAY THYROID STIM HORMONE: CPT

## 2024-03-26 PROCEDURE — 36415 COLL VENOUS BLD VENIPUNCTURE: CPT

## 2024-03-26 PROCEDURE — 85025 COMPLETE CBC W/AUTO DIFF WBC: CPT

## 2024-03-26 PROCEDURE — 80061 LIPID PANEL: CPT

## 2024-03-26 PROCEDURE — 80053 COMPREHEN METABOLIC PANEL: CPT

## 2024-04-01 ENCOUNTER — HOSPITAL ENCOUNTER (OUTPATIENT)
Dept: NON INVASIVE DIAGNOSTICS | Facility: CLINIC | Age: 79
Discharge: HOME | End: 2024-04-01

## 2024-04-08 ENCOUNTER — HOSPITAL ENCOUNTER (OUTPATIENT)
Dept: NON INVASIVE DIAGNOSTICS | Facility: CLINIC | Age: 79
Discharge: HOME | End: 2024-04-08
Payer: COMMERCIAL

## 2024-04-08 VITALS
WEIGHT: 231 LBS | BODY MASS INDEX: 34.21 KG/M2 | HEART RATE: 84 BPM | HEIGHT: 69 IN | SYSTOLIC BLOOD PRESSURE: 138 MMHG | DIASTOLIC BLOOD PRESSURE: 86 MMHG

## 2024-04-08 DIAGNOSIS — R06.02 SOB (SHORTNESS OF BREATH) ON EXERTION: ICD-10-CM

## 2024-04-08 DIAGNOSIS — I10 ESSENTIAL HYPERTENSION: ICD-10-CM

## 2024-04-08 DIAGNOSIS — M25.471 ANKLE EDEMA, BILATERAL: ICD-10-CM

## 2024-04-08 DIAGNOSIS — E78.2 MIXED HYPERLIPIDEMIA: ICD-10-CM

## 2024-04-08 DIAGNOSIS — M25.472 ANKLE EDEMA, BILATERAL: ICD-10-CM

## 2024-04-08 LAB
AORTIC ROOT: 2.6 CM
AORTIC VALVE MEAN VELOCITY: 15 M/S
APICAL FOUR CHAMBER EJECTION FRACTION: 63 %
ASCENDING AORTA: 3.6 CM
AV AREA BY CONTINUOUS VTI: 1.9 CM2
AV AREA PEAK VELOCITY: 1.5 CM2
AV LVOT MEAN GRADIENT: 3 MMHG
AV LVOT PEAK GRADIENT: 7 MMHG
AV MEAN GRADIENT: 11 MMHG
AV PEAK GRADIENT: 23 MMHG
AV VALVE AREA: 1.95 CM2
AV VELOCITY RATIO: 0.54
BSA FOR ECHO PROCEDURE: 2.18 M2
DOP CALC AO PEAK VEL: 2.39 M/S
DOP CALC AO VTI: 39.3 CM
DOP CALC LVOT AREA: 2.83 CM2
DOP CALC LVOT CARDIAC INDEX: 3.31 L/MIN/M2
DOP CALC LVOT CARDIAC OUTPUT: 7.24 L/MIN
DOP CALC LVOT DIAMETER: 1.9 CM
DOP CALC LVOT PEAK VEL VTI: 26.99 CM
DOP CALC LVOT PEAK VEL: 1.3 M/S
DOP CALC LVOT STROKE INDEX: 36.5 ML/M2
DOP CALC LVOT STROKE VOLUME: 76.49
E WAVE DECELERATION TIME: 157 MS
E/A RATIO: 0.61
FRACTIONAL SHORTENING: 36 (ref 28–44)
INTERVENTRICULAR SEPTUM IN DIASTOLE (PARASTERNAL SHORT AXIS VIEW): 1 CM
INTERVENTRICULAR SEPTUM: 1 CM (ref 0.6–1.1)
LAAS-AP2: 22.7 CM2
LAAS-AP4: 23 CM2
LEFT ATRIUM SIZE: 3.6 CM
LEFT ATRIUM VOLUME (MOD BIPLANE): 69 ML
LEFT ATRIUM VOLUME INDEX (MOD BIPLANE): 31.5 ML/M2
LEFT INTERNAL DIMENSION IN SYSTOLE: 2.9 CM (ref 2.1–4)
LEFT VENTRICULAR INTERNAL DIMENSION IN DIASTOLE: 4.5 CM (ref 3.5–6)
LEFT VENTRICULAR POSTERIOR WALL IN END DIASTOLE: 1 CM
LEFT VENTRICULAR STROKE VOLUME: 58 ML
LVSV (TEICH): 58 ML
MV E'TISSUE VEL-SEP: 11 CM/S
MV PEAK A VEL: 1.18 M/S
MV PEAK E VEL: 72 CM/S
MV STENOSIS PRESSURE HALF TIME: 45 MS
MV VALVE AREA P 1/2 METHOD: 4.89
RIGHT ATRIUM AREA SYSTOLE A4C: 14.3 CM2
RIGHT VENTRICLE ID DIMENSION: 2.9 CM
SINOTUBULAR JUNCTION: 2.7 CM
SL CV LEFT ATRIUM LENGTH A2C: 6.1 CM
SL CV LV EF: 70
SL CV PED ECHO LEFT VENTRICLE DIASTOLIC VOLUME (MOD BIPLANE) 2D: 92 ML
SL CV PED ECHO LEFT VENTRICLE SYSTOLIC VOLUME (MOD BIPLANE) 2D: 34 ML
SL CV SINUS OF VALSALVA 2D: 3 CM
STJ: 2.7 CM
TRICUSPID ANNULAR PLANE SYSTOLIC EXCURSION: 2.3 CM

## 2024-04-08 PROCEDURE — 93306 TTE W/DOPPLER COMPLETE: CPT | Performed by: INTERNAL MEDICINE

## 2024-04-08 PROCEDURE — 93306 TTE W/DOPPLER COMPLETE: CPT

## 2024-07-31 ENCOUNTER — RA CDI HCC (OUTPATIENT)
Dept: OTHER | Facility: HOSPITAL | Age: 79
End: 2024-07-31

## 2024-08-01 ENCOUNTER — OFFICE VISIT (OUTPATIENT)
Dept: FAMILY MEDICINE CLINIC | Facility: CLINIC | Age: 79
End: 2024-08-01
Payer: COMMERCIAL

## 2024-08-01 ENCOUNTER — TELEPHONE (OUTPATIENT)
Age: 79
End: 2024-08-01

## 2024-08-01 VITALS
DIASTOLIC BLOOD PRESSURE: 78 MMHG | SYSTOLIC BLOOD PRESSURE: 140 MMHG | HEART RATE: 87 BPM | WEIGHT: 226.6 LBS | OXYGEN SATURATION: 97 % | HEIGHT: 69 IN | BODY MASS INDEX: 33.56 KG/M2 | TEMPERATURE: 97.9 F | RESPIRATION RATE: 18 BRPM

## 2024-08-01 DIAGNOSIS — R05.1 ACUTE COUGH: ICD-10-CM

## 2024-08-01 DIAGNOSIS — G57.90 NEUROPATHY OF LOWER EXTREMITY, UNSPECIFIED LATERALITY: Primary | ICD-10-CM

## 2024-08-01 DIAGNOSIS — I10 ESSENTIAL HYPERTENSION: ICD-10-CM

## 2024-08-01 DIAGNOSIS — R29.898 LEG HEAVINESS: ICD-10-CM

## 2024-08-01 DIAGNOSIS — J20.9 ACUTE BRONCHITIS, UNSPECIFIED ORGANISM: ICD-10-CM

## 2024-08-01 DIAGNOSIS — G47.00 INSOMNIA, UNSPECIFIED TYPE: ICD-10-CM

## 2024-08-01 DIAGNOSIS — E78.2 MIXED HYPERLIPIDEMIA: ICD-10-CM

## 2024-08-01 LAB
SARS-COV-2 AG UPPER RESP QL IA: NEGATIVE
VALID CONTROL: NORMAL

## 2024-08-01 PROCEDURE — 87811 SARS-COV-2 COVID19 W/OPTIC: CPT | Performed by: NURSE PRACTITIONER

## 2024-08-01 PROCEDURE — 3078F DIAST BP <80 MM HG: CPT | Performed by: NURSE PRACTITIONER

## 2024-08-01 PROCEDURE — 99214 OFFICE O/P EST MOD 30 MIN: CPT | Performed by: NURSE PRACTITIONER

## 2024-08-01 PROCEDURE — 1159F MED LIST DOCD IN RCRD: CPT | Performed by: NURSE PRACTITIONER

## 2024-08-01 PROCEDURE — G2211 COMPLEX E/M VISIT ADD ON: HCPCS | Performed by: NURSE PRACTITIONER

## 2024-08-01 PROCEDURE — 3077F SYST BP >= 140 MM HG: CPT | Performed by: NURSE PRACTITIONER

## 2024-08-01 PROCEDURE — 1160F RVW MEDS BY RX/DR IN RCRD: CPT | Performed by: NURSE PRACTITIONER

## 2024-08-01 RX ORDER — TRAZODONE HYDROCHLORIDE 50 MG/1
50 TABLET ORAL
Qty: 90 TABLET | Refills: 1 | Status: SHIPPED | OUTPATIENT
Start: 2024-08-01

## 2024-08-01 RX ORDER — AZITHROMYCIN 250 MG/1
TABLET, FILM COATED ORAL
Qty: 6 TABLET | Refills: 0 | Status: SHIPPED | OUTPATIENT
Start: 2024-08-01 | End: 2024-08-05

## 2024-08-01 RX ORDER — GABAPENTIN 100 MG/1
100 CAPSULE ORAL
Qty: 90 CAPSULE | Refills: 3 | Status: SHIPPED | OUTPATIENT
Start: 2024-08-01

## 2024-08-01 NOTE — TELEPHONE ENCOUNTER
Patient call and would like to speak with Sissy about the medication he just  today. Please had Sissy contact the patient when she available thank you.

## 2024-08-01 NOTE — TELEPHONE ENCOUNTER
Start with the Gabapentin. This sometimes has a side effect of fatigue.  If his sleep doesn't improve when taking the Gabapentin, then he can add on the Trazodone.

## 2024-08-01 NOTE — ASSESSMENT & PLAN NOTE
May take Trazodone 50 mg at HS  SE reviewed  Proper sleep habits reviewed  Try to be more active during the day

## 2024-08-01 NOTE — ASSESSMENT & PLAN NOTE
Arterial duplex ordered   Start Gabapentin 100 mg at HS  SE reviewed   Pt did not like compression stockings

## 2024-08-01 NOTE — TELEPHONE ENCOUNTER
He wants clarification on how to administer gabapentin and trazodone. Which one should he take first?

## 2024-08-01 NOTE — PROGRESS NOTES
Ambulatory Visit  Name: Ranulfo Krishnamurthy      : 1945      MRN: [id number]  Encounter Provider: YAQUELIN Ac  Encounter Date: 2024   Encounter department: [facility] [place]    Assessment & Plan   1. Neuropathy of lower extremity, unspecified laterality  Assessment & Plan:  Arterial duplex ordered   Start Gabapentin 100 mg at HS  SE reviewed   Pt did not like compression stockings  Orders:  -     gabapentin (NEURONTIN) 100 mg capsule; Take 1 capsule (100 mg total) by mouth daily at bedtime  -     VAS ARTERIAL DUPLEX- LOWER LIMB BILATERAL; Future; Expected date: 2024  2. Insomnia, unspecified type  Assessment & Plan:  May take Trazodone 50 mg at HS  SE reviewed  Proper sleep habits reviewed  Try to be more active during the day   Orders:  -     traZODone (DESYREL) 50 mg tablet; Take 1 tablet (50 mg total) by mouth daily at bedtime  3. Mixed hyperlipidemia  Assessment & Plan:  Pt stopped statin therapy after myalgias   ASCVD risk at 43.6% which we discussed today   Repeat lab work ordered   Pt has no interest in trying a different statin therapy   Continue Omega 3  Follow a low fat/ low cholesterol diet, work on weight loss   Orders:  -     Lipid panel; Future  -     TSH, 3rd generation with Free T4 reflex; Future  4. Essential hypertension  Assessment & Plan:  BP is stable   Continue Amlodipine 5 mg daily   Follow a low salt diet  Lab work ordered today   Orders:  -     CBC and differential; Future  -     Comprehensive metabolic panel; Future  -     TSH, 3rd generation with Free T4 reflex; Future  5. Acute bronchitis, unspecified organism  Comments:  Rapid COVID negative today.  Start Medrol dose ricardo.  May use OTC Mucinex and Flonase prn.  Call with worsening symptoms  Orders:  -     azithromycin (ZITHROMAX) 250 mg tablet; Take 2 tablets today then 1 tablet daily x 4 days  6. Acute cough  -     POCT Rapid Covid Ag  7. Leg heaviness  -     VAS ARTERIAL DUPLEX- LOWER LIMB  BILATERAL; Future; Expected date: 08/01/2024       History of Present Illness     HPI  Ray presents by himself today for a routine follow up    He notes still having some discomfort in his left lower leg intermittently.  Feels somewhat like a burning or heavy sensation.  Worse as the day progresses.  No skin color changes.  Possibly some tingling.  No leg cramping.  +some swelling at the end of the day.  Echo from 4/8/24:    Left Ventricle: Left ventricle is not well visualized. Left ventricular cavity size is normal. Wall thickness is normal. The left ventricular ejection fraction is 70% by visual estimation. Systolic function is vigorous. Wall motion is normal. Diastolic function is mildly abnormal, consistent with grade I (abnormal) relaxation.    Left Atrium: The atrium is mildly dilated.    Aortic Valve: There is aortic valve sclerosis.    Started with cold symptoms this past weekend  Low grade temp yesterday, cough, nasal congestion   No SOB or wheezing.  No N/V/D  No recent travel.  His son had similar symptoms recently as well     Trouble sleeping more recently- only getting about 3 hours of sleep per night.  Trouble both falling asleep and staying asleep.  He notices that if he is out camping, he sleeps much better      Review of Systems   Constitutional:  Negative for activity change, appetite change, chills, diaphoresis, fatigue, fever and unexpected weight change.   Eyes:  Negative for visual disturbance.   Respiratory:  Negative for cough, chest tightness, shortness of breath and wheezing.    Cardiovascular:  Negative for chest pain, palpitations and leg swelling.   Gastrointestinal:  Negative for abdominal pain, blood in stool, constipation, diarrhea and nausea.   Genitourinary:  Negative for dysuria.   Musculoskeletal:  Positive for arthralgias. Negative for myalgias.   Skin:  Negative for rash and wound.   Neurological:  Negative for dizziness, weakness, numbness and headaches.  "  Psychiatric/Behavioral:  Negative for dysphoric mood, self-injury, sleep disturbance and suicidal ideas. The patient is not nervous/anxious.      Current Outpatient Medications on File Prior to Visit   Medication Sig Dispense Refill    amLODIPine (NORVASC) 5 mg tablet TAKE 1 TABLET BY MOUTH TWICE A  tablet 1    aspirin (ECOTRIN LOW STRENGTH) 81 mg EC tablet Take 81 mg by mouth daily      Omega-3 Fatty Acids (FISH OIL) 1,000 mg Take 1,000 mg by mouth daily      fluticasone (FLONASE) 50 mcg/act nasal spray 2 sprays into each nostril daily (Patient not taking: Reported on 3/21/2024) 1 Bottle 5     No current facility-administered medications on file prior to visit.      Social History     Tobacco Use    Smoking status: Former     Current packs/day: 0.00     Average packs/day: 1 pack/day for 63.1 years (63.1 ttl pk-yrs)     Types: Cigarettes     Start date: 11/10/1956     Quit date: 12/3/2019     Years since quittin.6     Passive exposure: Past    Smokeless tobacco: Never   Vaping Use    Vaping status: Never Used   Substance and Sexual Activity    Alcohol use: Yes    Drug use: Never    Sexual activity: Yes     Partners: Female     Objective     /78   Pulse 87   Temp 97.9 °F (36.6 °C) (Temporal)   Resp 18   Ht 5' 8.5\" (1.74 m)   Wt 103 kg (226 lb 9.6 oz)   SpO2 97%   BMI 33.95 kg/m²     Physical Exam  Vitals reviewed.   Constitutional:       General: He is not in acute distress.     Appearance: Normal appearance. He is well-developed. He is obese. He is not ill-appearing, toxic-appearing or diaphoretic.   HENT:      Head: Normocephalic and atraumatic.      Right Ear: Tympanic membrane normal.      Left Ear: Tympanic membrane normal.      Nose: Congestion and rhinorrhea present.      Mouth/Throat:      Mouth: Mucous membranes are moist.      Pharynx: Oropharynx is clear.      Tonsils: No tonsillar exudate or tonsillar abscesses.   Eyes:      Extraocular Movements: Extraocular movements intact.    "   Conjunctiva/sclera: Conjunctivae normal.      Pupils: Pupils are equal, round, and reactive to light.   Neck:      Thyroid: No thyromegaly.      Vascular: No carotid bruit.   Cardiovascular:      Rate and Rhythm: Normal rate and regular rhythm.      Pulses: Normal pulses.      Heart sounds: Normal heart sounds. No murmur heard.     Comments: No LE edema   B/L LE with no skin color changes.  Skin is warm and dry.  No wounds, erythema.    Pulmonary:      Effort: Pulmonary effort is normal. No respiratory distress.      Breath sounds: Normal breath sounds. No wheezing.   Abdominal:      General: Bowel sounds are normal. There is no distension.      Palpations: Abdomen is soft.      Tenderness: There is no abdominal tenderness.   Musculoskeletal:         General: Normal range of motion.      Cervical back: Normal range of motion and neck supple. No rigidity or tenderness. No muscular tenderness.   Lymphadenopathy:      Cervical: No cervical adenopathy.   Skin:     General: Skin is warm and dry.      Capillary Refill: Capillary refill takes less than 2 seconds.      Findings: No bruising, erythema or rash.   Neurological:      General: No focal deficit present.      Mental Status: He is alert and oriented to person, place, and time.      Cranial Nerves: No cranial nerve deficit.   Psychiatric:         Mood and Affect: Mood normal.         Behavior: Behavior normal.         Thought Content: Thought content normal.         Judgment: Judgment normal.       Administrative Statements

## 2024-08-01 NOTE — ASSESSMENT & PLAN NOTE
Pt stopped statin therapy after myalgias   ASCVD risk at 43.6% which we discussed today   Repeat lab work ordered   Pt has no interest in trying a different statin therapy   Continue Omega 3  Follow a low fat/ low cholesterol diet, work on weight loss

## 2024-08-14 ENCOUNTER — HOSPITAL ENCOUNTER (OUTPATIENT)
Dept: NON INVASIVE DIAGNOSTICS | Facility: CLINIC | Age: 79
Discharge: HOME | End: 2024-08-14
Payer: COMMERCIAL

## 2024-08-14 DIAGNOSIS — G57.90 NEUROPATHY OF LOWER EXTREMITY, UNSPECIFIED LATERALITY: ICD-10-CM

## 2024-08-14 DIAGNOSIS — R29.898 LEG HEAVINESS: ICD-10-CM

## 2024-08-14 PROCEDURE — 93925 LOWER EXTREMITY STUDY: CPT

## 2024-08-14 PROCEDURE — 93923 UPR/LXTR ART STDY 3+ LVLS: CPT

## 2024-08-15 ENCOUNTER — TELEPHONE (OUTPATIENT)
Dept: FAMILY MEDICINE CLINIC | Facility: CLINIC | Age: 79
End: 2024-08-15

## 2024-08-15 DIAGNOSIS — I70.201 STENOSIS OF RIGHT FEMORAL ARTERY (HCC): Primary | ICD-10-CM

## 2024-08-15 PROCEDURE — 93925 LOWER EXTREMITY STUDY: CPT | Performed by: SURGERY

## 2024-08-15 PROCEDURE — 93922 UPR/L XTREMITY ART 2 LEVELS: CPT | Performed by: SURGERY

## 2024-08-15 NOTE — TELEPHONE ENCOUNTER
Called and spoke to patient. Patient aware of results. 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  He needs to be evaluated by Vascular and I did place this referral

## 2024-08-27 NOTE — PROGRESS NOTES
Assessment/Plan:      There are no diagnoses linked to this encounter.      Claudication in peripheral vascular disease (HCC)  78-year-old male referred for peripheral vascular disease.  He has had bilateral lower extremity heaviness when walking approximately 100 yards for the past year.  This is equal in both his right and left lower extremity.  He denies rest pain as well as lower extremity wounds.  He has had no prior arterial procedures.  He is currently smoking and is not currently disabled by his symptoms.  His PCP ordered a lower extremity arterial duplex which demonstrated a right SFA occlusion with an TARYN of0.75 on the right and 0.67 on the left.        I discussed the results of his testing with him as well as management strategy at this point.  I recommended conservative management at this point.  He is already taking an aspirin which I advised him to continue I will prescribe him Lipitor 20 mg daily and I informed him that he should get LFTs and refills of this prescription from his PCP.  I will also refer him to the smoking cessation program as well as a supervised walking program.  I advised him to walk is much as possible and be as active as he can it is okay to wear a low grade compression stocking which he inquired about and would like to continue wearing.    I will order lower extremity arterial duplex for him in 6 months and an office visit for follow-up in 1 year.  He also will need screening for abdominal aortic aneurysm as well as carotid stenosis and I will order duplexes for that as well in 6 months.          Subjective:     Patient ID: Ranulfo Krishnamurthy is a 78 y.o. male.    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 DEIRDRE done 8/14/24. Patient reports b/l leg tiredness with walking x 100 yards. Resolves with rest. Denies rest pain and no wounds.     HPI    Review of Systems   Constitutional: Negative.    HENT: Negative.     Eyes: Negative.     Respiratory:  Positive for shortness of breath.    Cardiovascular:  Positive for leg swelling.   Gastrointestinal: Negative.    Endocrine: Negative.    Genitourinary: Negative.    Musculoskeletal:  Positive for arthralgias.   Skin: Negative.    Allergic/Immunologic: Negative.    Neurological:  Positive for weakness and numbness.   Hematological: Negative.    Psychiatric/Behavioral: Negative.         I have reviewed the ROS above and made changes as needed.      Objective:     Physical Exam    General  Exam: alert, awake, oriented, no distress, consistent with stated age    Integumentary  Exam: warm, dry, no gross lesions, no bruises and normal color    Head and Neck  Exam: supple, right carotid bruit, trachea midline, no JVD, no mass or palpable nodes    Eye  Exam: extraoccular movements intact, no scleral icterus, sclera clear, pupils equal round and reactive to light    ENMT  Exam: oral mucosa pink and moist    Chest and Lung  Exam: chest normal without deformity, bilaterally expansive, clear to auscultation    Cardiovascular  Exam: regular rate, regular rhythm, no murmurs, no rubs or gallops    Adbomen  Exam: soft, non-tender, non-distended, no pulsatile abdominal masses, no abdominal bruit    Peripheral Vascular  Exam: no clubbing of the digits of the upper extremity, no cyanosis, no edema, both feet are warm, radial pulses 2+ bilaterally, skin well perfused, without and no varicosities.    No widened popliteal pulse noted bilaterally    Upper Extremity:  Palpation: Radial pulse- Bilateral 2+    Lower Extremity:  Palpation: Femoral pulse- Bilateral 2+          Absent distal pulses bilaterally   No BLE wounds, feet warm  Neurologic  Exam:alert, non-focal, oriented x 3, cranial nerves II-XII grossly intact        Tobacco use is a significant patient-modifiable risk factor for this patient’s vascular disease with multiple vascular comorbidities, and a significant risk factor for failure of and complications from  any endovascular or surgical interventions.    I explained to the patient the effects of smoking including peripheral artery disease, coronary artery disease, cerebrovascular disease as well as cancer and chronic obstructive pulmonary disease. I asked the patient to stop smoking immediately. It is never too late to quit, and many studies show significant health benefits as well as economical savings after smoking cessation. I offered to the patient nicotine replacement therapy as well as referral to the smoking cessation program and access to the quit line 8-172-WKIVSBG or ambulatory referral to our network smoking cessation program.    Based on our conversation, this patient does not appear ready to quit    And declined my offer of nicotine replacement or tobacco cessation medications    The patient did not set a quit date. I will continue to  follow up on this issue at our next scheduled visit.     I spent approximately 10 minutes on tobacco cessation counseling with this patient.

## 2024-08-28 ENCOUNTER — CONSULT (OUTPATIENT)
Dept: VASCULAR SURGERY | Facility: CLINIC | Age: 79
End: 2024-08-28
Payer: COMMERCIAL

## 2024-08-28 ENCOUNTER — PATIENT OUTREACH (OUTPATIENT)
Dept: OTHER | Facility: CLINIC | Age: 79
End: 2024-08-28

## 2024-08-28 VITALS
HEIGHT: 69 IN | WEIGHT: 227 LBS | HEART RATE: 84 BPM | BODY MASS INDEX: 33.62 KG/M2 | DIASTOLIC BLOOD PRESSURE: 82 MMHG | SYSTOLIC BLOOD PRESSURE: 142 MMHG

## 2024-08-28 DIAGNOSIS — I67.2 CEREBRAL ATHEROSCLEROSIS: ICD-10-CM

## 2024-08-28 DIAGNOSIS — I70.201 STENOSIS OF RIGHT FEMORAL ARTERY (HCC): ICD-10-CM

## 2024-08-28 DIAGNOSIS — I73.9 CLAUDICATION IN PERIPHERAL VASCULAR DISEASE (HCC): Primary | ICD-10-CM

## 2024-08-28 PROCEDURE — 99205 OFFICE O/P NEW HI 60 MIN: CPT | Performed by: SURGERY

## 2024-08-28 RX ORDER — ATORVASTATIN CALCIUM 20 MG/1
20 TABLET, FILM COATED ORAL DAILY
Qty: 30 TABLET | Refills: 3 | Status: SHIPPED | OUTPATIENT
Start: 2024-08-28

## 2024-08-28 NOTE — ASSESSMENT & PLAN NOTE
78-year-old male referred for peripheral vascular disease.  He has had bilateral lower extremity heaviness when walking approximately 100 yards for the past year.  This is equal in both his right and left lower extremity.  He denies rest pain as well as lower extremity wounds.  He has had no prior arterial procedures.  He is currently smoking and is not currently disabled by his symptoms.  His PCP ordered a lower extremity arterial duplex which demonstrated a right SFA occlusion with an TARYN of0.75 on the right and 0.67 on the left.        I discussed the results of his testing with him as well as management strategy at this point.  I recommended conservative management at this point.  He is already taking an aspirin which I advised him to continue I will prescribe him Lipitor 20 mg daily and I informed him that he should get LFTs and refills of this prescription from his PCP.  I will also refer him to the smoking cessation program as well as a supervised walking program.  I advised him to walk is much as possible and be as active as he can it is okay to wear a low grade compression stocking which he inquired about and would like to continue wearing.    I will order lower extremity arterial duplex for him in 6 months and an office visit for follow-up in 1 year.  He also will need screening for abdominal aortic aneurysm as well as carotid stenosis and I will order duplexes for that as well in 6 months.

## 2024-09-05 DIAGNOSIS — I10 ESSENTIAL HYPERTENSION: ICD-10-CM

## 2024-09-06 RX ORDER — AMLODIPINE BESYLATE 5 MG/1
TABLET ORAL
Qty: 180 TABLET | Refills: 1 | Status: SHIPPED | OUTPATIENT
Start: 2024-09-06

## 2025-01-22 DIAGNOSIS — G47.00 INSOMNIA, UNSPECIFIED TYPE: ICD-10-CM

## 2025-01-22 RX ORDER — TRAZODONE HYDROCHLORIDE 50 MG/1
50 TABLET, FILM COATED ORAL
Qty: 90 TABLET | Refills: 1 | Status: SHIPPED | OUTPATIENT
Start: 2025-01-22

## 2025-02-24 ENCOUNTER — TELEPHONE (OUTPATIENT)
Age: 80
End: 2025-02-24

## 2025-02-24 NOTE — TELEPHONE ENCOUNTER
Dr. Fareed Price, anesthesia, was contacted and informed of patient's history, EKG, Blood Sugar (11/5/2020) and planned surgery. Orders received and no clearance required. Per Yasemin Dior EKG from 2019 is ok. Pt called to speak Sissy Maria, pt states he spoke to her 6 months ago and would like to speak to her again. Pt did not want to disclose the reason.

## 2025-02-27 DIAGNOSIS — I10 ESSENTIAL HYPERTENSION: ICD-10-CM

## 2025-02-27 RX ORDER — AMLODIPINE BESYLATE 5 MG/1
5 TABLET ORAL 2 TIMES DAILY
Qty: 180 TABLET | Refills: 1 | Status: SHIPPED | OUTPATIENT
Start: 2025-02-27

## 2025-04-03 ENCOUNTER — OFFICE VISIT (OUTPATIENT)
Dept: FAMILY MEDICINE CLINIC | Facility: CLINIC | Age: 80
End: 2025-04-03
Payer: COMMERCIAL

## 2025-04-03 VITALS
TEMPERATURE: 98 F | HEIGHT: 69 IN | RESPIRATION RATE: 18 BRPM | BODY MASS INDEX: 32.23 KG/M2 | SYSTOLIC BLOOD PRESSURE: 138 MMHG | WEIGHT: 217.6 LBS | HEART RATE: 87 BPM | DIASTOLIC BLOOD PRESSURE: 82 MMHG | OXYGEN SATURATION: 98 %

## 2025-04-03 DIAGNOSIS — F17.219 CIGARETTE NICOTINE DEPENDENCE WITH NICOTINE-INDUCED DISORDER: ICD-10-CM

## 2025-04-03 DIAGNOSIS — I10 ESSENTIAL HYPERTENSION: ICD-10-CM

## 2025-04-03 DIAGNOSIS — Z00.00 ANNUAL WELLNESS VISIT: ICD-10-CM

## 2025-04-03 DIAGNOSIS — I70.201 STENOSIS OF RIGHT FEMORAL ARTERY (HCC): ICD-10-CM

## 2025-04-03 DIAGNOSIS — E78.2 MIXED HYPERLIPIDEMIA: ICD-10-CM

## 2025-04-03 DIAGNOSIS — I67.2 CEREBRAL ATHEROSCLEROSIS: ICD-10-CM

## 2025-04-03 DIAGNOSIS — I73.9 CLAUDICATION IN PERIPHERAL VASCULAR DISEASE (HCC): Primary | ICD-10-CM

## 2025-04-03 DIAGNOSIS — G47.00 INSOMNIA, UNSPECIFIED TYPE: ICD-10-CM

## 2025-04-03 DIAGNOSIS — E66.811 OBESITY (BMI 30.0-34.9): ICD-10-CM

## 2025-04-03 PROCEDURE — 99214 OFFICE O/P EST MOD 30 MIN: CPT | Performed by: NURSE PRACTITIONER

## 2025-04-03 PROCEDURE — G2211 COMPLEX E/M VISIT ADD ON: HCPCS | Performed by: NURSE PRACTITIONER

## 2025-04-03 PROCEDURE — G0439 PPPS, SUBSEQ VISIT: HCPCS | Performed by: NURSE PRACTITIONER

## 2025-04-03 NOTE — PATIENT INSTRUCTIONS
Medicare Preventive Visit Patient Instructions  Thank you for completing your Welcome to Medicare Visit or Medicare Annual Wellness Visit today. Your next wellness visit will be due in one year (4/4/2026).  The screening/preventive services that you may require over the next 5-10 years are detailed below. Some tests may not apply to you based off risk factors and/or age. Screening tests ordered at today's visit but not completed yet may show as past due. Also, please note that scanned in results may not display below.  Preventive Screenings:  Service Recommendations Previous Testing/Comments   Colorectal Cancer Screening  Colonoscopy    Fecal Occult Blood Test (FOBT)/Fecal Immunochemical Test (FIT)  Fecal DNA/Cologuard Test  Flexible Sigmoidoscopy Age: 45-75 years old   Colonoscopy: every 10 years (May be performed more frequently if at higher risk)  OR  FOBT/FIT: every 1 year  OR  Cologuard: every 3 years  OR  Sigmoidoscopy: every 5 years  Screening may be recommended earlier than age 45 if at higher risk for colorectal cancer. Also, an individualized decision between you and your healthcare provider will decide whether screening between the ages of 76-85 would be appropriate. Colonoscopy: Not on file  FOBT/FIT: Not on file  Cologuard: Not on file  Sigmoidoscopy: Not on file          Prostate Cancer Screening Individualized decision between patient and health care provider in men between ages of 55-69   Medicare will cover every 12 months beginning on the day after your 50th birthday PSA: No results in last 5 years           Hepatitis C Screening Once for adults born between 1945 and 1965  More frequently in patients at high risk for Hepatitis C Hep C Antibody: 02/28/2022        Diabetes Screening 1-2 times per year if you're at risk for diabetes or have pre-diabetes Fasting glucose: 96 mg/dL (3/26/2024)  A1C: 5.3 % (2/28/2022)      Cholesterol Screening Once every 5 years if you don't have a lipid disorder. May  order more often based on risk factors. Lipid panel: 03/26/2024         Other Preventive Screenings Covered by Medicare:  Abdominal Aortic Aneurysm (AAA) Screening: covered once if your at risk. You're considered to be at risk if you have a family history of AAA or a male between the age of 65-75 who smoking at least 100 cigarettes in your lifetime.  Lung Cancer Screening: covers low dose CT scan once per year if you meet all of the following conditions: (1) Age 55-77; (2) No signs or symptoms of lung cancer; (3) Current smoker or have quit smoking within the last 15 years; (4) You have a tobacco smoking history of at least 20 pack years (packs per day x number of years you smoked); (5) You get a written order from a healthcare provider.  Glaucoma Screening: covered annually if you're considered high risk: (1) You have diabetes OR (2) Family history of glaucoma OR (3)  aged 50 and older OR (4)  American aged 65 and older  Osteoporosis Screening: covered every 2 years if you meet one of the following conditions: (1) Have a vertebral abnormality; (2) On glucocorticoid therapy for more than 3 months; (3) Have primary hyperparathyroidism; (4) On osteoporosis medications and need to assess response to drug therapy.  HIV Screening: covered annually if you're between the age of 15-65. Also covered annually if you are younger than 15 and older than 65 with risk factors for HIV infection. For pregnant patients, it is covered up to 3 times per pregnancy.    Immunizations:  Immunization Recommendations   Influenza Vaccine Annual influenza vaccination during flu season is recommended for all persons aged >= 6 months who do not have contraindications   Pneumococcal Vaccine   * Pneumococcal conjugate vaccine = PCV13 (Prevnar 13), PCV15 (Vaxneuvance), PCV20 (Prevnar 20)  * Pneumococcal polysaccharide vaccine = PPSV23 (Pneumovax) Adults 19-65 yo with certain risk factors or if 65+ yo  If never received any  pneumonia vaccine: recommend Prevnar 20 (PCV20)  Give PCV20 if previously received 1 dose of PCV13 or PPSV23   Hepatitis B Vaccine 3 dose series if at intermediate or high risk (ex: diabetes, end stage renal disease, liver disease)   Respiratory syncytial virus (RSV) Vaccine - COVERED BY MEDICARE PART D  * RSVPreF3 (Arexvy) CDC recommends that adults 60 years of age and older may receive a single dose of RSV vaccine using shared clinical decision-making (SCDM)   Tetanus (Td) Vaccine - COST NOT COVERED BY MEDICARE PART B Following completion of primary series, a booster dose should be given every 10 years to maintain immunity against tetanus. Td may also be given as tetanus wound prophylaxis.   Tdap Vaccine - COST NOT COVERED BY MEDICARE PART B Recommended at least once for all adults. For pregnant patients, recommended with each pregnancy.   Shingles Vaccine (Shingrix) - COST NOT COVERED BY MEDICARE PART B  2 shot series recommended in those 19 years and older who have or will have weakened immune systems or those 50 years and older     Health Maintenance Due:      Topic Date Due   • Hepatitis C Screening  Completed     Immunizations Due:      Topic Date Due   • Influenza Vaccine (1) Never done   • COVID-19 Vaccine (3 - 2024-25 season) 09/01/2024     Advance Directives   What are advance directives?  Advance directives are legal documents that state your wishes and plans for medical care. These plans are made ahead of time in case you lose your ability to make decisions for yourself. Advance directives can apply to any medical decision, such as the treatments you want, and if you want to donate organs.   What are the types of advance directives?  There are many types of advance directives, and each state has rules about how to use them. You may choose a combination of any of the following:  Living will:  This is a written record of the treatment you want. You can also choose which treatments you do not want, which  to limit, and which to stop at a certain time. This includes surgery, medicine, IV fluid, and tube feedings.   Durable power of  for healthcare (DPAHC):  This is a written record that states who you want to make healthcare choices for you when you are unable to make them for yourself. This person, called a proxy, is usually a family member or a friend. You may choose more than 1 proxy.  Do not resuscitate (DNR) order:  A DNR order is used in case your heart stops beating or you stop breathing. It is a request not to have certain forms of treatment, such as CPR. A DNR order may be included in other types of advance directives.  Medical directive:  This covers the care that you want if you are in a coma, near death, or unable to make decisions for yourself. You can list the treatments you want for each condition. Treatment may include pain medicine, surgery, blood transfusions, dialysis, IV or tube feedings, and a ventilator (breathing machine).  Values history:  This document has questions about your views, beliefs, and how you feel and think about life. This information can help others choose the care that you would choose.  Why are advance directives important?  An advance directive helps you control your care. Although spoken wishes may be used, it is better to have your wishes written down. Spoken wishes can be misunderstood, or not followed. Treatments may be given even if you do not want them. An advance directive may make it easier for your family to make difficult choices about your care.   Cigarette Smoking and Your Health   Risks to your health if you smoke:  Nicotine and other chemicals found in tobacco damage every cell in your body. Even if you are a light smoker, you have an increased risk for cancer, heart disease, and lung disease. If you are pregnant or have diabetes, smoking increases your risk for complications.   Benefits to your health if you stop smoking:   You decrease respiratory  symptoms such as coughing, wheezing, and shortness of breath.   You reduce your risk for cancers of the lung, mouth, throat, kidney, bladder, pancreas, stomach, and cervix. If you already have cancer, you increase the benefits of chemotherapy. You also reduce your risk for cancer returning or a second cancer from developing.   You reduce your risk for heart disease, blood clots, heart attack, and stroke.   You reduce your risk for lung infections, and diseases such as pneumonia, asthma, chronic bronchitis, and emphysema.  Your circulation improves. More oxygen can be delivered to your body. If you have diabetes, you lower your risk for complications, such as kidney, artery, and eye diseases. You also lower your risk for nerve damage. Nerve damage can lead to amputations, poor vision, and blindness.  You improve your body's ability to heal and to fight infections.  For more information and support to stop smoking:   [employer]  Phone: 3- 058 - [phone]  Web Address: www.[employer]  Weight Management   Why it is important to manage your weight:  Being overweight increases your risk of health conditions such as heart disease, high blood pressure, type 2 diabetes, and certain types of cancer. It can also increase your risk for osteoarthritis, sleep apnea, and other respiratory problems. Aim for a slow, steady weight loss. Even a small amount of weight loss can lower your risk of health problems.  How to lose weight safely:  A safe and healthy way to lose weight is to eat fewer calories and get regular exercise. You can lose up about 1 pound a week by decreasing the number of calories you eat by 500 calories each day.   Healthy meal plan for weight management:  A healthy meal plan includes a variety of foods, contains fewer calories, and helps you stay healthy. A healthy meal plan includes the following:  Eat whole-grain foods more often.  A healthy meal plan should contain fiber. Fiber is the part of grains,  fruits, and vegetables that is not broken down by your body. Whole-grain foods are healthy and provide extra fiber in your diet. Some examples of whole-grain foods are whole-wheat breads and pastas, oatmeal, brown rice, and bulgur.  Eat a variety of vegetables every day.  Include dark, leafy greens such as spinach, kale, bertram greens, and mustard greens. Eat yellow and orange vegetables such as carrots, sweet potatoes, and winter squash.   Eat a variety of fruits every day.  Choose fresh or canned fruit (canned in its own juice or light syrup) instead of juice. Fruit juice has very little or no fiber.  Eat low-fat dairy foods.  Drink fat-free (skim) milk or 1% milk. Eat fat-free yogurt and low-fat cottage cheese. Try low-fat cheeses such as mozzarella and other reduced-fat cheeses.  Choose meat and other protein foods that are low in fat.  Choose beans or other legumes such as split peas or lentils. Choose fish, skinless poultry (chicken or turkey), or lean cuts of red meat (beef or pork). Before you cook meat or poultry, cut off any visible fat.   Use less fat and oil.  Try baking foods instead of frying them. Add less fat, such as margarine, sour cream, regular salad dressing and mayonnaise to foods. Eat fewer high-fat foods. Some examples of high-fat foods include french fries, doughnuts, ice cream, and cakes.  Eat fewer sweets.  Limit foods and drinks that are high in sugar. This includes candy, cookies, regular soda, and sweetened drinks.  Exercise:  Exercise at least 30 minutes per day on most days of the week. Some examples of exercise include walking, biking, dancing, and swimming. You can also fit in more physical activity by taking the stairs instead of the elevator or parking farther away from stores. Ask your healthcare provider about the best exercise plan for you.      © Copyright [employer] 2018 Information is for End User's use only and may not be sold, redistributed or otherwise used for  commercial purposes. All illustrations and images included in CareNotes® are the copyrighted property of [employer] or [employer]

## 2025-04-03 NOTE — PROGRESS NOTES
Name: Ranulfo Krishnamurthy      : 1945      MRN: [id number]  Encounter Provider: YAQUELIN Ac  Encounter Date: 4/3/2025   Encounter department: [facility]  :  No chief complaint on file.    Health Maintenance   Topic Date Due    Zoster Vaccine (1 of 2) Never done    RSV Vaccine for Pregnant Patients and Patients Age 60+ Years (1 - 1-dose 75+ series) Never done    Influenza Vaccine (1) Never done    COVID-19 Vaccine (3 - 2024-25 season) 2024    Medicare Annual Wellness Visit (AWV)  2025    Fall Risk  2026    Depression Screening  2026    Hepatitis C Screening  Completed    Pneumococcal Vaccine: 65+ Years  Completed    Meningococcal B Vaccine  Aged Out    RSV Vaccine age 0-20 Months  Aged Out    HIB Vaccine  Aged Out    IPV Vaccine  Aged Out    Hepatitis A Vaccine  Aged Out    Meningococcal ACWY Vaccine  Aged Out    HPV Vaccine  Aged Out       Assessment & Plan  Annual wellness visit         Claudication in peripheral vascular disease (HCC)  Stable  Vascular evaluation completed 24  He will continue with home leg exercises which have been helping  Continue Lipitor 20 mg daily   Lab work as ordered   Encouraged compression stockings   Lower extremity arterial duplex which demonstrated a right SFA occlusion with an TARYN of 0.75 on the right and 0.67 on the left completed 24  Vascular has ordered repeat lower extremity arterial duplex as well as screening for AAA and carotid stenosis  Stop smoking!!!    Orders:    VAS carotid complete study; Future    Stenosis of right femoral artery (HCC)  24: Lower extremity arterial duplex which demonstrated a right SFA occlusion with an TARYN of 0.75 on the right and 0.67 on the left   Vascular is now following with Ray  Repeat Arterial duplex due now which was ordered by Vascular  Continue statin therapy   Compression stockings encouraged   Smoking cessation advised!         Mixed hyperlipidemia  Pt was  restarted on Lipitor 20 mg daily via Vascular in 8/2025  He has been compliant with this and is tolerating it well   Continue Omega 3  Follow a low fat/ low cholesterol diet  Lab work as ordered    Orders:    VAS carotid complete study; Future    Essential hypertension  BP is stable   Continue Amlodipine 5 mg daily   Follow a low salt diet  Lab work ordered today     Orders:    VAS carotid complete study; Future    Cigarette nicotine dependence with nicotine-induced disorder  60 ppd year history   Over the past 5-6 years, he will intermittently smoke for a few months  Has been smoking 4-5 cigarettes daily for the past 8 months or so   Smoking cessation advised bu the has no interest in this currently          Obesity (BMI 30.0-34.9)  Encouraged lifestyle modifications     Orders:    VAS carotid complete study; Future    Cerebral atherosclerosis    Orders:    VAS carotid complete study; Future    Insomnia, unspecified type  Improved   No longer taking Trazodone            Depression Screening and Follow-up Plan: Patient was screened for depression during today's encounter. They screened negative with a PHQ-2 score of 0.      Tobacco Cessation Counseling: Tobacco cessation counseling was provided. The patient is sincerely urged to quit consumption of tobacco. He is not ready to quit tobacco. Medication options and side effects of medication discussed. Patient refused medication.       Preventive health issues were discussed with patient, and age appropriate screening tests were ordered as noted in patient's After Visit Summary. Personalized health advice and appropriate referrals for health education or preventive services given if needed, as noted in patient's After Visit Summary.    History of Present Illness     HPI   Ray presents by himself today for a routine follow up and AWV    Saw Vascular 8/28/24 for claudication in peripheral vascular disease   Lower extremity arterial duplex which demonstrated a right SFA  occlusion with an TARYN of 0.75 on the right and 0.67 on the left completed 8/14/24  He was started on Lipitor 20 mg daily, advised to stop smoking, use compression stockings and a repeat lower extremity arterial duplex for 6 months was ordered.  Vascular also placed orders to screen for abdominal aortic aneurysm and carotid artery stenosis.     Today, Colby tells me that he is okay with having the carotid artery duplex but prefers to hold off on the other testing.  He notes doing some home leg exercises which have actually helped his legs.  He is compliant with the Lipitor and tolerates this.  Smoking 4-5 cigarettes daily again which he started a few months ago.  No interest in quitting again right now.  He does not use compression stockings.  Notes some mild LE swelling bilaterally but he feels this has also improved.  He also stopped the Gabapentin for his LE neuropathy since the leg exercises have helped     No longer taking Trazodone for sleep.  He notes he stopped napping during the day and this has improved his sleep at night     Taking Amlodipine 5 mg daily for HTN.  Diet could be better in regards to sodium      Patient Care Team:  YAQUELIN Ac as PCP - General (Family Medicine)    Review of Systems   Constitutional:  Negative for activity change, appetite change, chills, diaphoresis, fatigue, fever and unexpected weight change.   Eyes:  Negative for visual disturbance.   Respiratory:  Negative for cough, chest tightness, shortness of breath and wheezing.    Cardiovascular:  Positive for leg swelling. Negative for chest pain and palpitations.   Gastrointestinal:  Negative for abdominal pain, blood in stool, constipation, diarrhea and nausea.   Genitourinary:  Negative for dysuria.   Musculoskeletal:  Positive for arthralgias. Negative for myalgias.   Skin:  Negative for rash and wound.   Neurological:  Positive for weakness and numbness. Negative for dizziness and headaches.    Psychiatric/Behavioral:  Negative for dysphoric mood, self-injury, sleep disturbance and suicidal ideas. The patient is not nervous/anxious.      Medical History Reviewed by provider this encounter:       Annual Wellness Visit Questionnaire   Ranulfo is here for his Subsequent Wellness visit. Last Medicare Wellness visit information reviewed, patient interviewed, no change since last AWV.     Health Risk Assessment:   Patient rates overall health as excellent. Patient feels that their physical health rating is same. Patient is very satisfied with their life. Eyesight was rated as same. Hearing was rated as same. Patient feels that their emotional and mental health rating is same. Patients states they are never, rarely angry. Patient states they are never, rarely unusually tired/fatigued. Pain experienced in the last 7 days has been some. Patient's pain rating has been 3/10. Patient states that he has experienced no weight loss or gain in last 6 months.     Depression Screening:   PHQ-2 Score: 0      Fall Risk Screening:   In the past year, patient has experienced: no history of falling in past year      Home Safety:  Patient does not have trouble with stairs inside or outside of their home. Patient has working smoke alarms and has working carbon monoxide detector. Home safety hazards include: none.     Nutrition:   Current diet is Regular.     Medications:   Patient is currently taking over-the-counter supplements. OTC medications include: see medication list. Patient is able to manage medications.     Activities of Daily Living (ADLs)/Instrumental Activities of Daily Living (IADLs):   Walk and transfer into and out of bed and chair?: Yes  Dress and groom yourself?: Yes    Bathe or shower yourself?: Yes    Feed yourself? Yes  Do your laundry/housekeeping?: Yes  Manage your money, pay your bills and track your expenses?: Yes  Make your own meals?: Yes    Do your own shopping?: Yes    Previous Hospitalizations:    Any hospitalizations or ED visits within the last 12 months?: No      Advance Care Planning:   Living will: Yes    Durable POA for healthcare: Yes    Advanced directive: Yes      Cognitive Screening:   Provider or family/friend/caregiver concerned regarding cognition?: No    Preventive Screenings      Cardiovascular Screening:    General: Screening Not Indicated and History Lipid Disorder      Diabetes Screening:     General: Screening Current      Colorectal Cancer Screening:     General: Screening Not Indicated      Prostate Cancer Screening:    General: Screening Not Indicated      Osteoporosis Screening:    General: Patient Declines      Abdominal Aortic Aneurysm (AAA) Screening:    Risk factors include: tobacco use        General: Screening Not Indicated      Lung Cancer Screening:     General: Screening Not Indicated      Hepatitis C Screening:    General: Screening Current    Screening, Brief Intervention, and Referral to Treatment (SBIRT)     Screening  Typical number of drinks in a day: 0  Typical number of drinks in a week: 0  Interpretation: Low risk drinking behavior.    Single Item Drug Screening:  How often have you used an illegal drug (including marijuana) or a prescription medication for non-medical reasons in the past year? never    Single Item Drug Screen Score: 0  Interpretation: Negative screen for possible drug use disorder    Social Drivers of Health     Financial Resource Strain: Low Risk  (3/3/2023)    Overall Financial Resource Strain (CARDIA)     Difficulty of Paying Living Expenses: Not hard at all   Food Insecurity: No Food Insecurity (4/3/2025)    Hunger Vital Sign     Worried About Running Out of Food in the Last Year: Never true     Ran Out of Food in the Last Year: Never true   Transportation Needs: No Transportation Needs (4/3/2025)    PRAPARE - Transportation     Lack of Transportation (Medical): No     Lack of Transportation (Non-Medical): No   Housing Stability: Unknown  "(4/3/2025)    Housing Stability Vital Sign     Unable to Pay for Housing in the Last Year: No     Homeless in the Last Year: No   Utilities: Not At Risk (4/3/2025)    [facility] Utilities     Threatened with loss of utilities: No     No results found.    Objective   /82   Pulse 87   Temp 98 °F (36.7 °C) (Tympanic)   Resp 18   Ht 5' 8.7\" (1.745 m)   Wt 98.7 kg (217 lb 9.6 oz)   SpO2 98%   BMI 32.42 kg/m²     Physical Exam  Vitals reviewed.   Constitutional:       General: He is not in acute distress.     Appearance: Normal appearance. He is obese. He is not ill-appearing, toxic-appearing or diaphoretic.   HENT:      Head: Normocephalic and atraumatic.      Mouth/Throat:      Mouth: Mucous membranes are moist.   Eyes:      Extraocular Movements: Extraocular movements intact.      Conjunctiva/sclera: Conjunctivae normal.      Pupils: Pupils are equal, round, and reactive to light.   Cardiovascular:      Rate and Rhythm: Normal rate and regular rhythm.      Pulses: Normal pulses.      Heart sounds: Normal heart sounds. No murmur heard.     Comments: Trace B/L LE edema, skin is warm and dry, no skin color changes  Pulmonary:      Effort: Pulmonary effort is normal. No respiratory distress.      Breath sounds: Normal breath sounds. No wheezing.   Abdominal:      General: Bowel sounds are normal. There is no distension.      Palpations: Abdomen is soft.      Tenderness: There is no abdominal tenderness.   Musculoskeletal:         General: Normal range of motion.      Cervical back: Normal range of motion and neck supple. No rigidity or tenderness. No muscular tenderness.   Lymphadenopathy:      Cervical: No cervical adenopathy.   Skin:     General: Skin is warm and dry.      Capillary Refill: Capillary refill takes less than 2 seconds.      Findings: No bruising, erythema or rash.   Neurological:      General: No focal deficit present.      Mental Status: He is alert and oriented to person, place, and time.      " Cranial Nerves: No cranial nerve deficit.   Psychiatric:         Mood and Affect: Mood normal.         Behavior: Behavior normal.         Thought Content: Thought content normal.         Judgment: Judgment normal.

## 2025-04-08 PROBLEM — F17.200 NICOTINE DEPENDENCE: Status: ACTIVE | Noted: 2019-12-10

## 2025-04-08 PROBLEM — I70.201 STENOSIS OF RIGHT FEMORAL ARTERY (HCC): Status: ACTIVE | Noted: 2025-04-08

## 2025-04-08 NOTE — ASSESSMENT & PLAN NOTE
Pt was restarted on Lipitor 20 mg daily via Vascular in 8/2025  He has been compliant with this and is tolerating it well   Continue Omega 3  Follow a low fat/ low cholesterol diet  Lab work as ordered    Orders:    VAS carotid complete study; Future

## 2025-04-08 NOTE — ASSESSMENT & PLAN NOTE
Stable  Vascular evaluation completed 8/28/24  He will continue with home leg exercises which have been helping  Continue Lipitor 20 mg daily   Lab work as ordered   Encouraged compression stockings   Lower extremity arterial duplex which demonstrated a right SFA occlusion with an TARYN of 0.75 on the right and 0.67 on the left completed 8/14/24  Vascular has ordered repeat lower extremity arterial duplex as well as screening for AAA and carotid stenosis  Stop smoking!!!    Orders:    VAS carotid complete study; Future

## 2025-04-08 NOTE — ASSESSMENT & PLAN NOTE
60 ppd year history   Over the past 5-6 years, he will intermittently smoke for a few months  Has been smoking 4-5 cigarettes daily for the past 8 months or so   Smoking cessation advised bu the has no interest in this currently

## 2025-04-08 NOTE — ASSESSMENT & PLAN NOTE
8/14/24: Lower extremity arterial duplex which demonstrated a right SFA occlusion with an TARYN of 0.75 on the right and 0.67 on the left   Vascular is now following with Ray  Repeat Arterial duplex due now which was ordered by Vascular  Continue statin therapy   Compression stockings encouraged   Smoking cessation advised!

## 2025-04-08 NOTE — ASSESSMENT & PLAN NOTE
BP is stable   Continue Amlodipine 5 mg daily   Follow a low salt diet  Lab work ordered today     Orders:    VAS carotid complete study; Future

## 2025-04-14 ENCOUNTER — TELEPHONE (OUTPATIENT)
Age: 80
End: 2025-04-14

## 2025-04-14 ENCOUNTER — DOCUMENTATION (OUTPATIENT)
Dept: HEMATOLOGY ONCOLOGY | Facility: CLINIC | Age: 80
End: 2025-04-14

## 2025-04-14 ENCOUNTER — HOSPITAL ENCOUNTER (EMERGENCY)
Facility: HOSPITAL | Age: 80
Discharge: HOME | End: 2025-04-14
Attending: EMERGENCY MEDICINE
Payer: COMMERCIAL

## 2025-04-14 ENCOUNTER — APPOINTMENT (EMERGENCY)
Dept: CT IMAGING | Facility: HOSPITAL | Age: 80
End: 2025-04-14
Payer: COMMERCIAL

## 2025-04-14 VITALS
WEIGHT: 215.83 LBS | BODY MASS INDEX: 32.15 KG/M2 | HEART RATE: 95 BPM | DIASTOLIC BLOOD PRESSURE: 91 MMHG | TEMPERATURE: 97.8 F | SYSTOLIC BLOOD PRESSURE: 196 MMHG | RESPIRATION RATE: 20 BRPM | OXYGEN SATURATION: 98 %

## 2025-04-14 DIAGNOSIS — R59.9 ADENOPATHY: ICD-10-CM

## 2025-04-14 DIAGNOSIS — S22.000A COMPRESSION FRACTURE OF BODY OF THORACIC VERTEBRA (HCC): ICD-10-CM

## 2025-04-14 DIAGNOSIS — K59.03 DRUG-INDUCED CONSTIPATION: ICD-10-CM

## 2025-04-14 DIAGNOSIS — C79.51 METASTATIC CANCER TO BONE (HCC): ICD-10-CM

## 2025-04-14 DIAGNOSIS — M54.50 ACUTE LOW BACK PAIN: Primary | ICD-10-CM

## 2025-04-14 DIAGNOSIS — R52 PAIN: Primary | ICD-10-CM

## 2025-04-14 LAB
ALBUMIN SERPL BCG-MCNC: 4.2 G/DL (ref 3.5–5)
ALP SERPL-CCNC: 128 U/L (ref 34–104)
ALT SERPL W P-5'-P-CCNC: 13 U/L (ref 7–52)
ANION GAP SERPL CALCULATED.3IONS-SCNC: 10 MMOL/L (ref 4–13)
AST SERPL W P-5'-P-CCNC: 16 U/L (ref 13–39)
BASOPHILS # BLD AUTO: 0.09 THOUSANDS/ÂΜL (ref 0–0.1)
BASOPHILS NFR BLD AUTO: 1 % (ref 0–1)
BILIRUB DIRECT SERPL-MCNC: 0.04 MG/DL (ref 0–0.2)
BILIRUB SERPL-MCNC: 0.42 MG/DL (ref 0.2–1)
BUN SERPL-MCNC: 17 MG/DL (ref 5–25)
CALCIUM SERPL-MCNC: 9.2 MG/DL (ref 8.4–10.2)
CHLORIDE SERPL-SCNC: 105 MMOL/L (ref 96–108)
CO2 SERPL-SCNC: 26 MMOL/L (ref 21–32)
CREAT SERPL-MCNC: 0.97 MG/DL (ref 0.6–1.3)
EOSINOPHIL # BLD AUTO: 0.31 THOUSAND/ÂΜL (ref 0–0.61)
EOSINOPHIL NFR BLD AUTO: 3 % (ref 0–6)
ERYTHROCYTE [DISTWIDTH] IN BLOOD BY AUTOMATED COUNT: 13.5 % (ref 11.6–15.1)
GFR SERPL CREATININE-BSD FRML MDRD: 73 ML/MIN/1.73SQ M
GLUCOSE SERPL-MCNC: 109 MG/DL (ref 65–140)
HCT VFR BLD AUTO: 41.4 % (ref 36.5–49.3)
HGB BLD-MCNC: 14.1 G/DL (ref 12–17)
IMM GRANULOCYTES # BLD AUTO: 0.06 THOUSAND/UL (ref 0–0.2)
IMM GRANULOCYTES NFR BLD AUTO: 1 % (ref 0–2)
LIPASE SERPL-CCNC: 12 U/L (ref 11–82)
LYMPHOCYTES # BLD AUTO: 1.53 THOUSANDS/ÂΜL (ref 0.6–4.47)
LYMPHOCYTES NFR BLD AUTO: 14 % (ref 14–44)
MCH RBC QN AUTO: 31.9 PG (ref 26.8–34.3)
MCHC RBC AUTO-ENTMCNC: 34.1 G/DL (ref 31.4–37.4)
MCV RBC AUTO: 94 FL (ref 82–98)
MONOCYTES # BLD AUTO: 1.07 THOUSAND/ÂΜL (ref 0.17–1.22)
MONOCYTES NFR BLD AUTO: 10 % (ref 4–12)
NEUTROPHILS # BLD AUTO: 8.01 THOUSANDS/ÂΜL (ref 1.85–7.62)
NEUTS SEG NFR BLD AUTO: 71 % (ref 43–75)
NRBC BLD AUTO-RTO: 0 /100 WBCS
PLATELET # BLD AUTO: 384 THOUSANDS/UL (ref 149–390)
PMV BLD AUTO: 8.7 FL (ref 8.9–12.7)
POTASSIUM SERPL-SCNC: 3.9 MMOL/L (ref 3.5–5.3)
PROT SERPL-MCNC: 7.8 G/DL (ref 6.4–8.4)
RBC # BLD AUTO: 4.42 MILLION/UL (ref 3.88–5.62)
SODIUM SERPL-SCNC: 141 MMOL/L (ref 135–147)
WBC # BLD AUTO: 11.07 THOUSAND/UL (ref 4.31–10.16)

## 2025-04-14 PROCEDURE — 83690 ASSAY OF LIPASE: CPT

## 2025-04-14 PROCEDURE — 36415 COLL VENOUS BLD VENIPUNCTURE: CPT

## 2025-04-14 PROCEDURE — 99284 EMERGENCY DEPT VISIT MOD MDM: CPT

## 2025-04-14 PROCEDURE — 85025 COMPLETE CBC W/AUTO DIFF WBC: CPT

## 2025-04-14 PROCEDURE — 80076 HEPATIC FUNCTION PANEL: CPT

## 2025-04-14 PROCEDURE — 99285 EMERGENCY DEPT VISIT HI MDM: CPT | Performed by: EMERGENCY MEDICINE

## 2025-04-14 PROCEDURE — 74177 CT ABD & PELVIS W/CONTRAST: CPT

## 2025-04-14 PROCEDURE — 80048 BASIC METABOLIC PNL TOTAL CA: CPT

## 2025-04-14 RX ORDER — ACETAMINOPHEN 325 MG/1
975 TABLET ORAL ONCE
Status: COMPLETED | OUTPATIENT
Start: 2025-04-14 | End: 2025-04-14

## 2025-04-14 RX ORDER — IBUPROFEN 400 MG/1
400 TABLET, FILM COATED ORAL ONCE
Status: DISCONTINUED | OUTPATIENT
Start: 2025-04-14 | End: 2025-04-14

## 2025-04-14 RX ORDER — ACETAMINOPHEN 500 MG
1000 TABLET ORAL EVERY 8 HOURS PRN
Qty: 30 TABLET | Refills: 5 | Status: SHIPPED | OUTPATIENT
Start: 2025-04-14

## 2025-04-14 RX ORDER — METHOCARBAMOL 500 MG/1
500 TABLET, FILM COATED ORAL ONCE
Status: COMPLETED | OUTPATIENT
Start: 2025-04-14 | End: 2025-04-14

## 2025-04-14 RX ORDER — OXYCODONE HYDROCHLORIDE 5 MG/1
5 TABLET ORAL EVERY 8 HOURS PRN
Qty: 12 TABLET | Refills: 0 | Status: SHIPPED | OUTPATIENT
Start: 2025-04-14 | End: 2025-04-18 | Stop reason: SDUPTHER

## 2025-04-14 RX ORDER — DOCUSATE SODIUM 100 MG/1
100 CAPSULE, LIQUID FILLED ORAL 3 TIMES DAILY PRN
Qty: 30 CAPSULE | Refills: 5 | Status: SHIPPED | OUTPATIENT
Start: 2025-04-14

## 2025-04-14 RX ORDER — IBUPROFEN 800 MG/1
800 TABLET, FILM COATED ORAL EVERY 8 HOURS PRN
Qty: 30 TABLET | Refills: 5 | Status: SHIPPED | OUTPATIENT
Start: 2025-04-14

## 2025-04-14 RX ORDER — LIDOCAINE 50 MG/G
1 PATCH TOPICAL ONCE
Status: DISCONTINUED | OUTPATIENT
Start: 2025-04-14 | End: 2025-04-14 | Stop reason: HOSPADM

## 2025-04-14 RX ADMIN — ACETAMINOPHEN 975 MG: 325 TABLET, FILM COATED ORAL at 04:25

## 2025-04-14 RX ADMIN — LIDOCAINE 1 PATCH: 700 PATCH TOPICAL at 04:24

## 2025-04-14 RX ADMIN — METHOCARBAMOL 500 MG: 500 TABLET ORAL at 04:25

## 2025-04-14 RX ADMIN — METHOCARBAMOL 500 MG: 500 TABLET ORAL at 04:37

## 2025-04-14 RX ADMIN — IOHEXOL 100 ML: 350 INJECTION, SOLUTION INTRAVENOUS at 05:30

## 2025-04-14 NOTE — INCIDENTAL FINDINGS
The following findings require follow up:  Radiographic finding   Finding: CT recon only lumbar spine  Result Date: 4/14/2025  Impression: 1. 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 Evaluation with MRI of the lumbar spine with contrast is recommended. Lower thoracic spine should be included versus obtaining a separate MRI of the thoracic spine. 2. Left common iliac and left external iliac adenopathy which could be related to metastasis or lymphoproliferative disease.    CT abdomen pelvis with contrast  Result Date: 4/14/2025  Impression: 1. Left common iliac and external iliac adenopathy with nonspecific mildly prominent mesenteric nodes. Differential diagnosis includes metastases from prostate cancer or other malignancy or lymphoma. 2. Multiple metastatic bony lesions in the thoracic, lumbar spine and left iliac bone with pathologic compression at T11. Mass extending in the canal at L2 causing canal stenosis. Evaluation with MRI of the lower thoracic thoracic and lumbar spine is recommended.     Follow up required: Neurology, Hematology/Oncology, PCP   Follow up should be done within 1-2 week(s)    Please notify the following clinician to assist with the follow up:   YAQUELIN Ac    Incidental finding results were discussed with the Patient by Helder Guerrero MD on 04/14/25.   They expressed understanding and all questions answered.

## 2025-04-14 NOTE — TELEPHONE ENCOUNTER
Pt's daughter called.  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  They are wondering if he doesn't have enough medicine that the ER gave him would we provide that for him?  Can doctor look at the ER notes. Does he need another appt to come in since was just there and has other appts.    Please call Evert back.  Thanks.  Evert Krishnamurthy () [phone] (Mobile)

## 2025-04-14 NOTE — TELEPHONE ENCOUNTER
I spoke with Colby and his son.  Reviewed ED notes and imaging from this morning.  CT of the abdomen and pelvis with:     IMPRESSION:        1. Left common iliac and external iliac adenopathy with nonspecific mildly prominent mesenteric nodes. Differential diagnosis includes metastases from prostate cancer or other malignancy or lymphoma.  2. Mul  tiple metastatic bony lesions in the thoracic, lumbar spine and left iliac bone with pathologic compression at T11. Mass extending in the canal at L2 causing canal stenosis.  Evaluation with MRI of the lower thoracic thoracic and lumbar spine is recommended.    Interpreted by Helder Guerrero MD on 4/14/2025  6:28 AM           Colby will be seeing Hematology/Oncology this Wednesday, 4/16/25.  He has Oxycodone 5 mg one tab every 8 hours prn.  He was instructed by the ED that he can also take Ibuprofen and/or Tylenol OTC prn.  He has asked for scripts of these to be sent to his pharmacy and I did send these.  I reviewed side effects of Oxycodone and I sent Colace to his pharmacy for prn use.  He notes his pain is mild right now.  He has less pain when sitting still or lying down.  Increased pain with ambulation.  He knows to let me know if the pain is not being adequately controlled with the Oxycodone, Ibuprofen and Tylenol.      Will need MRI of the thoracic and lumbar spine but will defer these to Hematology/Oncology on Wednesday of this week.      All questions answered, expressed my support and sympathy.  Encouraged Colby to reach out with any questions or concerns.  I'll check back in with him later this week.

## 2025-04-14 NOTE — ED PROVIDER NOTES
Time reflects when diagnosis was documented in both MDM as applicable and the Disposition within this note       Time User Action Codes Description Comment    4/14/2025  4:11 AM Helder Guerrero [M54.50] Acute low back pain     4/14/2025  6:36 AM Helder Guerrero Add [C79.51] Metastatic cancer to bone (HCC)     4/14/2025  6:36 AM Helder Guerrero Add [S22.000A] Compression fracture of body of thoracic vertebra (HCC)     4/14/2025  6:37 AM Helder Guerrero Add [R59.0] Inguinal adenopathy     4/14/2025  6:37 AM Helder Guerrero Remove [R59.0] Inguinal adenopathy     4/14/2025  6:37 AM Helder Guerrero Add [R59.9] Adenopathy           ED Disposition       ED Disposition   Discharge    Condition   Stable    Date/Time   Mon Apr 14, 2025  6:43 AM    Comment   Ranulfo Krishnamurthy discharge to home/self care.                   Assessment & Plan       Medical Decision Making  Pt is a 79 y.o. male who presents to the ED on April 14, 2025. Patient presents with right-sided lower back pain that has persisted for the past week.  Patient states that week prior he was out fishing and may have overly exerted himself.  Patient attempted to treat back pain with Aleve OTC with moderate improvement in pain.  However, earlier this morning at approximately 1:30 AM, patient woke up with significant back pain, prompting him to come to the ED.  Patient states that he still been able to walk independently, however states that walking is limited due to pain.  Patient also recently recovered from what he describes as a stomach bug that he and his son who was at bedside was also recovering from.  Patient states that pain is located to the right side, no radiation to the posterior lateral right leg. Patient otherwise denies any fever, chills, chest pain, current nausea, current vomiting, current diarrhea, urinary distention, urinary incontinence, fecal incontinence, abdominal pain, radiating pain, ambulatory dysfunction, neurologic  dysfunction, saddle anesthesia.  Patient also denies any IV drug use, illicit drug use. Still currently smoking.  ROS otherwise negative.  No other concerns at this time.    This patient presents with back pain most consistent with compression fractures of the thoracic vertebrae concerning for spinal mets of malignancy with unknown primary. No back pain red flags on history or physical. Presentation not consistent with cauda equina (no bowel or urinary incontinence/retention, no saddle anesthesia, no distal weakness), AAA, viscus perforation, osteomyelitis or epidural abscess (no IVDU, vertebral tenderness), renal colic, pyelonephritis (afebrile, no CVAT, no urinary symptoms).  Due to imaging findings, patient was provided with ambulatory referral to hematology oncology and neurosurgery.  Patient advised to continue pain management with OTC ibuprofen and Tylenol, patient provided course of oxycodone for breakthrough pain.  Discussed management and discharge plan with patient at bedside, who was in agreement, all questions answered, patient discharged home.    Amount and/or Complexity of Data Reviewed  Labs: ordered.  Radiology: ordered.    Risk  OTC drugs.  Prescription drug management.             Medications   lidocaine (LIDODERM) 5 % patch 1 patch (1 patch Topical Medication Applied 4/14/25 0424)   acetaminophen (TYLENOL) tablet 975 mg (975 mg Oral Given 4/14/25 0425)   methocarbamol (ROBAXIN) tablet 500 mg (500 mg Oral Given 4/14/25 0425)   methocarbamol (ROBAXIN) tablet 500 mg (500 mg Oral Given 4/14/25 0437)   iohexol (OMNIPAQUE) 350 MG/ML injection (MULTI-DOSE) 100 mL (100 mL Intravenous Given 4/14/25 0530)       ED Risk Strat Scores                    No data recorded        SBIRT 20yo+      Flowsheet Row Most Recent Value   Initial Alcohol Screen: US AUDIT-C     1. How often do you have a drink containing alcohol? 0 Filed at: 04/14/2025 0311   2. How many drinks containing alcohol do you have on a typical  day you are drinking?  0 Filed at: 04/14/2025 0311   3a. Male UNDER 65: How often do you have five or more drinks on one occasion? 0 Filed at: 04/14/2025 0311   3b. FEMALE Any Age, or MALE 65+: How often do you have 4 or more drinks on one occassion? 0 Filed at: 04/14/2025 0311   Audit-C Score 0 Filed at: 04/14/2025 0311   FELA: How many times in the past year have you...    Used an illegal drug or used a prescription medication for non-medical reasons? Never Filed at: 04/14/2025 0311                            History of Present Illness       Chief Complaint   Patient presents with    Back Pain     Lower back pain since Saturday. Denies injury to area. Took 2 ibuprofen prior to arrival. Bloating and nausea since this morning.        Past Medical History:   Diagnosis Date    Back pain       History reviewed. No pertinent surgical history.   Family History   Problem Relation Age of Onset    Cancer Mother     Heart attack Father 50      Social History     Tobacco Use    Smoking status: Every Day     Current packs/day: 0.25     Types: Cigarettes     Passive exposure: Past    Smokeless tobacco: Never   Vaping Use    Vaping status: Never Used   Substance Use Topics    Alcohol use: Yes    Drug use: Never      E-Cigarette/Vaping    E-Cigarette Use Never User       E-Cigarette/Vaping Substances      I have reviewed and agree with the history as documented.       Back Pain      Review of Systems   Musculoskeletal:  Positive for back pain.           Objective       ED Triage Vitals   Temperature Pulse Blood Pressure Respirations SpO2 Patient Position - Orthostatic VS   04/14/25 0307 04/14/25 0307 04/14/25 0307 04/14/25 0307 04/14/25 0307 --   97.8 °F (36.6 °C) 95 (!) 196/91 20 98 %       Temp src Heart Rate Source BP Location FiO2 (%) Pain Score    -- -- -- -- 04/14/25 0427        10 - Worst Possible Pain      Vitals      Date and Time Temp Pulse SpO2 Resp BP Pain Score FACES Pain Rating User   04/14/25 0427 -- -- -- -- -- 10  - Worst Possible Pain -- JY   04/14/25 0307 97.8 °F (36.6 °C) 95 98 % 20 196/91 -- -- [facility]            Physical Exam  Vitals and nursing note reviewed.   Constitutional:       General: He is not in acute distress.     Appearance: He is well-developed.          Comments: 5/5 strength with hip flexion and extension, knee flexion and extension, thigh abduction and adduction, dorsiflexion and plantarflexion of the ankles, inversion and eversion of the feet, and dorsiflexion of the toes bilaterally. Able to ambulate on the heels and toes. Intact sensation to light touch in the peripheral nerve distributions of the bilateral lower extremities. 2+ patellar and ankle reflexes bilaterally. No sustained ankle clonus. No saddle anesthesia.      HENT:      Head: Normocephalic and atraumatic.   Eyes:      Conjunctiva/sclera: Conjunctivae normal.   Cardiovascular:      Rate and Rhythm: Normal rate and regular rhythm.      Heart sounds: No murmur heard.     No friction rub. No gallop.   Pulmonary:      Effort: Pulmonary effort is normal. No respiratory distress.      Breath sounds: Normal breath sounds. No wheezing, rhonchi or rales.   Abdominal:      General: There is no distension.      Palpations: Abdomen is soft.      Tenderness: There is no abdominal tenderness. There is no right CVA tenderness, left CVA tenderness, guarding or rebound.   Musculoskeletal:         General: No swelling.      Cervical back: Neck supple.   Skin:     General: Skin is warm and dry.      Capillary Refill: Capillary refill takes less than 2 seconds.   Neurological:      General: No focal deficit present.      Mental Status: He is alert and oriented to person, place, and time.      Cranial Nerves: No cranial nerve deficit.      Sensory: No sensory deficit.      Motor: No weakness.      Coordination: Coordination normal.   Psychiatric:         Mood and Affect: Mood normal.         Results Reviewed       Procedure Component Value Units Date/Time    Basic  metabolic panel [[id number]] Collected: 04/14/25 0435    Lab Status: Final result Specimen: Blood from Arm, Left Updated: 04/14/25 0500     Sodium 141 mmol/L      Potassium 3.9 mmol/L      Chloride 105 mmol/L      CO2 26 mmol/L      ANION GAP 10 mmol/L      BUN 17 mg/dL      Creatinine 0.97 mg/dL      Glucose 109 mg/dL      Calcium 9.2 mg/dL      eGFR 73 ml/min/1.73sq m     Narrative:      [employer] guidelines for Chronic Kidney Disease (CKD):     Stage 1 with normal or high GFR (GFR > 90 mL/min/1.73 square meters)    Stage 2 Mild CKD (GFR = 60-89 mL/min/1.73 square meters)    Stage 3A Moderate CKD (GFR = 45-59 mL/min/1.73 square meters)    Stage 3B Moderate CKD (GFR = 30-44 mL/min/1.73 square meters)    Stage 4 Severe CKD (GFR = 15-29 mL/min/1.73 square meters)    Stage 5 End Stage CKD (GFR <15 mL/min/1.73 square meters)  Note: GFR calculation is accurate only with a steady state creatinine    Hepatic function panel [[id number]]  (Abnormal) Collected: 04/14/25 0435    Lab Status: Final result Specimen: Blood from Arm, Left Updated: 04/14/25 0500     Total Bilirubin 0.42 mg/dL      Bilirubin, Direct 0.04 mg/dL      Alkaline Phosphatase 128 U/L      AST 16 U/L      ALT 13 U/L      Total Protein 7.8 g/dL      Albumin 4.2 g/dL     Lipase [[id number]]  (Normal) Collected: 04/14/25 0435    Lab Status: Final result Specimen: Blood from Arm, Left Updated: 04/14/25 0500     Lipase 12 u/L     CBC and differential [[id number]]  (Abnormal) Collected: 04/14/25 0435    Lab Status: Final result Specimen: Blood from Arm, Left Updated: 04/14/25 0449     WBC 11.07 Thousand/uL      RBC 4.42 Million/uL      Hemoglobin 14.1 g/dL      Hematocrit 41.4 %      MCV 94 fL      MCH 31.9 pg      MCHC 34.1 g/dL      RDW 13.5 %      MPV 8.7 fL      Platelets 384 Thousands/uL      nRBC 0 /100 WBCs      Segmented % 71 %      Immature Grans % 1 %      Lymphocytes % 14 %      Monocytes % 10 %      Eosinophils Relative 3 %       Basophils Relative 1 %      Absolute Neutrophils 8.01 Thousands/µL      Absolute Immature Grans 0.06 Thousand/uL      Absolute Lymphocytes 1.53 Thousands/µL      Absolute Monocytes 1.07 Thousand/µL      Eosinophils Absolute 0.31 Thousand/µL      Basophils Absolute 0.09 Thousands/µL             CT abdomen pelvis with contrast   ED Interpretation by Helder Guerrero MD (04/14 0628)   FINDINGS:     ABDOMEN     LOWER CHEST: Subsegmental atelectasis in the left lower lobe.     LIVER/BILIARY TREE: Unremarkable.     GALLBLADDER: No calcified gallstones. No pericholecystic inflammatory change.     SPLEEN: Unremarkable.     PANCREAS: Unremarkable.     ADRENAL GLANDS: Low density lobulated thickening of bilateral adrenal glands, statistically likely due to small adenomas or adenomatous hyperplasia.     KIDNEYS/URETERS: No hydronephrosis. Renal vascular calcifications. Subcentimeter hypoattenuating renal lesion(s), too small to characterize but statistically likely benign, which do not warrant follow-up (Radiology June 2019).     STOMACH AND BOWEL: Stomach is unremarkable.  Small bowel loops show normal caliber.  No inflammatory changes or bowel obstruction.        APPENDIX: Normal.     ABDOMINOPELVIC CAVITY: No ascites. No pneumoperitoneum.  Enlarged left common iliac node measuring 2.3 x 1.7 cm series 302 image 109  5.3 x 3.8 x 6.4 cm left external iliac node series 302    image 131.  Multiple slightly prominent mesenteric nodes are seen, the largest with a short axis of 12 mm series 302 image 56.     VESSELS: Atherosclerosis without abdominal aortic aneurysm.  Ectasia of the infrarenal abdominal aorta measuring 2.8 cm.     PELVIS     REPRODUCTIVE ORGANS: Enlarged prostate. Bulging of the left posterior mid gland.  Right hydrocele.        URINARY BLADDER: Unremarkable.     ABDOMINAL WALL/INGUINAL REGIONS: Unremarkable.     BONES: Multiple lytic bony lesions at T10, T11, L1 and L2 with paravertebral soft tissue swelling at  T11.  Pathologic compression fracture is seen at T11. There is lytic bony lesion with mass extending in the canal at L2 causing canal stenosis.  Large lytic bony lesion is seen in the left iliac bone 302/116.     IMPRESSION:        1. Left common iliac and external iliac adenopathy with nonspecific mildly prominent mesenteric nodes. Differential diagnosis includes metastases from prostate cancer or other malignancy or lymphoma.  2. Mul   tiple metastatic bony lesions in the thoracic, lumbar spine and left iliac bone with pathologic compression at T11. Mass extending in the canal at L2 causing canal stenosis.  Evaluation with MRI of the lower thoracic thoracic and lumbar spine is recommended.        Final Interpretation by Stephanie Traore MD (04/14 0690)         1. Left common iliac and external iliac adenopathy with nonspecific mildly prominent mesenteric nodes. Differential diagnosis includes metastases from prostate cancer or other malignancy or lymphoma.   2. Multiple metastatic bony lesions in the thoracic, lumbar spine and left iliac bone with pathologic compression at T11. Mass extending in the canal at L2 causing canal stenosis.   Evaluation with MRI of the lower thoracic thoracic and lumbar spine is recommended.      I personally discussed this study with Adan Guerrero on 4/14/2025 6:23 AM.                  Workstation performed: [id number]         CT recon only lumbar spine   ED Interpretation by Helder Guerrero MD (04/14 0697)   FINDINGS:     ALIGNMENT: Normal alignment. No spondylolisthesis.  No scoliosis.     VERTEBRAE: There is partially visualized inferior endplate compression at T11.  Lytic bony lesions are demonstrated at T11, L1, L2.     DEGENERATIVE CHANGES: Degenerative disease of the lumbar spine with disc space narrowing and osteophyte formation at L4-5 and L5-S1.        PREVERTEBRAL AND PARASPINAL SOFT TISSUES: Paravertebral soft tissue swelling is demonstrated at T11 which could  represent hematoma due to pathologic compression fracture or mass. There is mass extending in the spinal canal at L2 with severe compression.  Evaluation with MRI lumbar spine with contrast is recommended.  Ectasia of the infrarenal abdominal aorta is seen.  Left common iliac adenopathy and left external iliac adenopathy is noted, described on CT abdomen and pelvis the same date.        IMPRESSION:        1. Metastatic bony lesions in the lower thoracic and lumbar spine with pathologic compression fracture at T11 and mass    extending in the canal at L2. Evaluation with MRI of the lumbar spine with contrast is recommended. Lower thoracic spine should be   included versus obtaining a separate MRI of the thoracic spine.  2. Left common iliac and left external iliac adenopathy which could be related to metastasis or lymphoproliferative disease.      Final Interpretation by Stephanie Traore MD (04/14 0623)         1. 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 Evaluation with MRI of the lumbar spine with contrast is recommended. Lower thoracic spine should be    included versus obtaining a separate MRI of the thoracic spine.   2. Left common iliac and left external iliac adenopathy which could be related to metastasis or lymphoproliferative disease.      I personally discussed this study with Helder Guerrero on 4/14/2025 6:22 AM.                     Workstation performed: [id number]             Procedures    ED Medication and Procedure Management   Prior to Admission Medications   Prescriptions Last Dose Informant Patient Reported? Taking?   Omega-3 Fatty Acids (FISH OIL) 1,000 mg  Self Yes No   Sig: Take 1,000 mg by mouth daily   amLODIPine (NORVASC) 5 mg tablet   No No   Sig: Take 1 tablet (5 mg total) by mouth 2 (two) times a day   aspirin (ECOTRIN LOW STRENGTH) 81 mg EC tablet  Self Yes No   Sig: Take 81 mg  by mouth daily   atorvastatin (LIPITOR) 20 mg tablet   No No   Sig: Take 1 tablet (20 mg total) by mouth daily      Facility-Administered Medications: None     Patient's Medications   Discharge Prescriptions    OXYCODONE (ROXICODONE) 5 IMMEDIATE RELEASE TABLET    Take 1 tablet (5 mg total) by mouth every 8 (eight) hours as needed for severe pain for up to 12 days Max Daily Amount: 15 mg       Start Date: 4/14/2025 End Date: 4/26/2025       Order Dose: 5 mg       Quantity: 12 tablet    Refills: 0       ED SEPSIS DOCUMENTATION   Time reflects when diagnosis was documented in both MDM as applicable and the Disposition within this note       Time User Action Codes Description Comment    4/14/2025  4:11 AM Helder Guerrero [M54.50] Acute low back pain     4/14/2025  6:36 AM Helder Guerrero [C79.51] Metastatic cancer to bone (HCC)     4/14/2025  6:36 AM Helder Guerrero [S22.000A] Compression fracture of body of thoracic vertebra (HCC)     4/14/2025  6:37 AM Helder Guerrero [R59.0] Inguinal adenopathy     4/14/2025  6:37 AM Helder Guerrero Remove [R59.0] Inguinal adenopathy     4/14/2025  6:37 AM Helder Guerrero [R59.9] Adenopathy                  Helder Guerrero MD  04/14/25 0708

## 2025-04-14 NOTE — ED ATTENDING ATTESTATION
4/14/2025  IAshley DO, saw and evaluated the patient. I have discussed the patient with the resident/non-physician practitioner and agree with the resident's/non-physician practitioner's findings, Plan of Care, and MDM as documented in the resident's/non-physician practitioner's note, except where noted. All available labs and Radiology studies were reviewed.  I was present for key portions of any procedure(s) performed by the resident/non-physician practitioner and I was immediately available to provide assistance.       At this point I agree with the current assessment done in the Emergency Department.  I have conducted an independent evaluation of this patient a history and physical is as follows:    79-year-old male presenting to the emergency department with son for right lower back pain that has been ongoing for the last week or so.  States he has been taking Aleve with some improvement, they had gotten better for 2 days until he woke up this morning at 1:30 AM and took Aleve and the pain did not get better.  Denies any nausea and vomiting to me.  States his abdomen also feels bloated tonight.  Complains of right lower back pain that radiates to the right lower flank.  No trauma.  No fevers or chills.  No bladder/bowel incontinence or retention.  No radiation of pain down his legs.  No numbness or tingling or weakness.    Upon physical examination, patient overall appears well, not in acute distress, heart regular rate, no increased work of breathing, has mild right lower paralumbar muscular tenderness to palpation, no midline spinal tenderness or step-offs, no abdominal tenderness to palpation.    No red flag signs for back pain.  CT scan results as below.  Patient's pain is improved on repeat evaluation.  He does not have any focal deficits.  Pain is controlled.  He is able to ambulate.  Updated with all results.  Provided prescription for pain medication.  Advise follow-up with heme-onc and  neurosurgery.  Also advised PCP follow-up.  Strict return precautions were discussed.    ED Course         Critical Care Time  Procedures    CT abdomen pelvis with contrast   ED Interpretation   FINDINGS:     ABDOMEN     LOWER CHEST: Subsegmental atelectasis in the left lower lobe.     LIVER/BILIARY TREE: Unremarkable.     GALLBLADDER: No calcified gallstones. No pericholecystic inflammatory change.     SPLEEN: Unremarkable.     PANCREAS: Unremarkable.     ADRENAL GLANDS: Low density lobulated thickening of bilateral adrenal glands, statistically likely due to small adenomas or adenomatous hyperplasia.     KIDNEYS/URETERS: No hydronephrosis. Renal vascular calcifications. Subcentimeter hypoattenuating renal lesion(s), too small to characterize but statistically likely benign, which do not warrant follow-up (Radiology June 2019).     STOMACH AND BOWEL: Stomach is unremarkable.  Small bowel loops show normal caliber.  No inflammatory changes or bowel obstruction.        APPENDIX: Normal.     ABDOMINOPELVIC CAVITY: No ascites. No pneumoperitoneum.  Enlarged left common iliac node measuring 2.3 x 1.7 cm series 302 image 109  5.3 x 3.8 x 6.4 cm left external iliac node series 302    image 131.  Multiple slightly prominent mesenteric nodes are seen, the largest with a short axis of 12 mm series 302 image 56.     VESSELS: Atherosclerosis without abdominal aortic aneurysm.  Ectasia of the infrarenal abdominal aorta measuring 2.8 cm.     PELVIS     REPRODUCTIVE ORGANS: Enlarged prostate. Bulging of the left posterior mid gland.  Right hydrocele.        URINARY BLADDER: Unremarkable.     ABDOMINAL WALL/INGUINAL REGIONS: Unremarkable.     BONES: Multiple lytic bony lesions at T10, T11, L1 and L2 with paravertebral soft tissue swelling at T11.  Pathologic compression fracture is seen at T11. There is lytic bony lesion with mass extending in the canal at L2 causing canal stenosis.  Large lytic bony lesion is seen in the left  iliac bone 302/116.     IMPRESSION:        1. Left common iliac and external iliac adenopathy with nonspecific mildly prominent mesenteric nodes. Differential diagnosis includes metastases from prostate cancer or other malignancy or lymphoma.  2. Mul   tiple metastatic bony lesions in the thoracic, lumbar spine and left iliac bone with pathologic compression at T11. Mass extending in the canal at L2 causing canal stenosis.  Evaluation with MRI of the lower thoracic thoracic and lumbar spine is recommended.        Final Result         1. Left common iliac and external iliac adenopathy with nonspecific mildly prominent mesenteric nodes. Differential diagnosis includes metastases from prostate cancer or other malignancy or lymphoma.   2. Multiple metastatic bony lesions in the thoracic, lumbar spine and left iliac bone with pathologic compression at T11. Mass extending in the canal at L2 causing canal stenosis.   Evaluation with MRI of the lower thoracic thoracic and lumbar spine is recommended.      I personally discussed this study with Adan Guerrero on 4/14/2025 6:23 AM.                  Workstation performed: [id number]         CT recon only lumbar spine   ED Interpretation   FINDINGS:     ALIGNMENT: Normal alignment. No spondylolisthesis.  No scoliosis.     VERTEBRAE: There is partially visualized inferior endplate compression at T11.  Lytic bony lesions are demonstrated at T11, L1, L2.     DEGENERATIVE CHANGES: Degenerative disease of the lumbar spine with disc space narrowing and osteophyte formation at L4-5 and L5-S1.        PREVERTEBRAL AND PARASPINAL SOFT TISSUES: Paravertebral soft tissue swelling is demonstrated at T11 which could represent hematoma due to pathologic compression fracture or mass. There is mass extending in the spinal canal at L2 with severe compression.  Evaluation with MRI lumbar spine with contrast is recommended.  Ectasia of the infrarenal abdominal aorta is seen.  Left common iliac  adenopathy and left external iliac adenopathy is noted, described on CT abdomen and pelvis the same date.        IMPRESSION:        1. Metastatic bony lesions in the lower thoracic and lumbar spine with pathologic compression fracture at T11 and mass    extending in the canal at L2. Evaluation with MRI of the lumbar spine with contrast is recommended. Lower thoracic spine should be   included versus obtaining a separate MRI of the thoracic spine.  2. Left common iliac and left external iliac adenopathy which could be related to metastasis or lymphoproliferative disease.      Final Result         1. 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 Evaluation with MRI of the lumbar spine with contrast is recommended. Lower thoracic spine should be    included versus obtaining a separate MRI of the thoracic spine.   2. Left common iliac and left external iliac adenopathy which could be related to metastasis or lymphoproliferative disease.      I personally discussed this study with Helder Guerrero on 4/14/2025 6:22 AM.                     Workstation performed: [id number]

## 2025-04-15 NOTE — PROGRESS NOTES
Name: Ranulfo Krishnamurthy      : 1945      MRN: [id number]  Encounter Provider: Chyna Salas MD  Encounter Date: 2025   Encounter department: [facility]  :  Assessment & Plan  Lytic bone lesions on xray    Seen in ER for back pain 2025     Referred to Heme Onc for workup     Labs 2025     Na 141 K 3.9 Cr 0.97 Ca 9.2 ALT/AST 13/16 lipase 12 WBC 11.1 Hgb 14.1  MCV 94 plts 384 ANC 8010       CT AP 2024 with the following      ABDOMINOPELVIC CAVITY: No ascites. No pneumoperitoneum.   Enlarged left common iliac node measuring 2.3 x 1.7 cm series 302 image 109   5.3 x 3.8 x 6.4 cm left external iliac node series 302   image 131.   Multiple slightly prominent mesenteric nodes are seen, the largest with a short axis of 12 mm series 302 image 56.      VESSELS: Atherosclerosis without abdominal aortic aneurysm.   Ectasia of the infrarenal abdominal aorta measuring 2.8 cm.      PELVIS      REPRODUCTIVE ORGANS: Enlarged prostate. Bulging of the left posterior mid gland.   Right hydrocele.         URINARY BLADDER: Unremarkable.      ABDOMINAL WALL/INGUINAL REGIONS: Unremarkable.      BONES: Multiple lytic bony lesions at T10, T11, L1 and L2 with paravertebral soft tissue swelling at T11.   Pathologic compression fracture is seen at T11. There is lytic bony lesion with mass extending in the canal at L2 causing canal stenosis.   Large lytic bony lesion is seen in the left iliac bone 302/116.      IMPRESSION:         1. Left common iliac and external iliac adenopathy with nonspecific mildly prominent mesenteric nodes. Differential diagnosis includes metastases from prostate cancer or other malignancy or lymphoma.   2. Mul   tiple metastatic bony lesions in the thoracic, lumbar spine and left iliac bone with pathologic compression at T11. Mass extending in the canal at L2 causing canal stenosis.   Evaluation with MRI of the lower thoracic thoracic  and lumbar spine is recommended.   Orders:    Testosterone; Future  No workup to date      Summary/Recommendations     Needs MRI T-L spine r/o cord compression, assess pathologic compression fracture    Does have back pain and reason for ER visit; may consider kyphoplasty    Has NS appointment in coming days    Labs/PSA     PSA    CT PET to be considered; if PSA elevated, will do PSMA, otherwise conventional FDG PET     If PSA not elevated, consider biopsy left external iliac as 5.3 cm and has pain in lower pelvis     SPEP, FLC B2M, LDH even though CBC normal    Follow up Zeferino 3 weeks         History of Present Illness   No chief complaint on file.      Pt is a 79 y.o. male who presents to the ED on April 14, 2025. Patient presents with right-sided lower back pain that has persisted for the past week.  Patient states that week prior he was out fishing and may have overly exerted himself.  Patient attempted to treat back pain with Aleve OTC with moderate improvement in pain.  However, earlier this morning at approximately 1:30 AM, patient woke up with significant back pain, prompting him to come to the ED.  Patient states that he still been able to walk independently, however states that walking is limited due to pain.  Patient also recently recovered from what he describes as a stomach bug that he and his son who was at bedside was also recovering from.  Patient states that pain is located to the right side, no radiation to the posterior lateral right leg. Patient otherwise denies any fever, chills, chest pain, current nausea, current vomiting, current diarrhea, urinary distention, urinary incontinence, fecal incontinence, abdominal pain, radiating pain, ambulatory dysfunction, neurologic dysfunction, saddle anesthesia.  Patient also denies any IV drug use, illicit drug use. Still currently smoking.  ROS otherwise negative.  No other concerns at this time.     This patient presents with back pain most consistent  with compression fractures of the thoracic vertebrae concerning for spinal mets of malignancy with unknown primary. No back pain red flags on history or physical. Presentation not consistent with cauda equina (no bowel or urinary incontinence/retention, no saddle anesthesia, no distal weakness), AAA, viscus perforation, osteomyelitis or epidural abscess (no IVDU, vertebral tenderness), renal colic, pyelonephritis (afebrile, no CVAT, no urinary symptoms).  Due to imaging findings, patient was provided with ambulatory referral to hematology oncology and neurosurgery.  Patient advised to continue pain management with OTC ibuprofen and Tylenol, patient provided course of oxycodone for breakthrough pain.  Discussed management and discharge plan with patient at bedside, who was in agreement, all questions answered, patient discharged home.        CT  AP 4/14/20225      LOWER CHEST: Subsegmental atelectasis in the left lower lobe.      LIVER/BILIARY TREE: Unremarkable.      GALLBLADDER: No calcified gallstones. No pericholecystic inflammatory change.      SPLEEN: Unremarkable.      PANCREAS: Unremarkable.      ADRENAL GLANDS: Low density lobulated thickening of bilateral adrenal glands, statistically likely due to small adenomas or adenomatous hyperplasia.      KIDNEYS/URETERS: No hydronephrosis. Renal vascular calcifications. Subcentimeter hypoattenuating renal lesion(s), too small to characterize but statistically likely benign, which do not warrant follow-up (Radiology June 2019).      STOMACH AND BOWEL: Stomach is unremarkable.   Small bowel loops show normal caliber.   No inflammatory changes or bowel obstruction.         APPENDIX: Normal.      ABDOMINOPELVIC CAVITY: No ascites. No pneumoperitoneum.   Enlarged left common iliac node measuring 2.3 x 1.7 cm series 302 image 109   5.3 x 3.8 x 6.4 cm left external iliac node series 302   image 131.   Multiple slightly prominent mesenteric nodes are seen, the largest with  a short axis of 12 mm series 302 image 56.      VESSELS: Atherosclerosis without abdominal aortic aneurysm.   Ectasia of the infrarenal abdominal aorta measuring 2.8 cm.      PELVIS      REPRODUCTIVE ORGANS: Enlarged prostate. Bulging of the left posterior mid gland.   Right hydrocele.         URINARY BLADDER: Unremarkable.      ABDOMINAL WALL/INGUINAL REGIONS: Unremarkable.      BONES: Multiple lytic bony lesions at T10, T11, L1 and L2 with paravertebral soft tissue swelling at T11.   Pathologic compression fracture is seen at T11. There is lytic bony lesion with mass extending in the canal at L2 causing canal stenosis.   Large lytic bony lesion is seen in the left iliac bone 302/116.      IMPRESSION:         1. Left common iliac and external iliac adenopathy with nonspecific mildly prominent mesenteric nodes. Differential diagnosis includes metastases from prostate cancer or other malignancy or lymphoma.   2. Mul   tiple metastatic bony lesions in the thoracic, lumbar spine and left iliac bone with pathologic compression at T11. Mass extending in the canal at L2 causing canal stenosis.   Evaluation with MRI of the lower thoracic thoracic and lumbar spine is recommended.       Patient states has had back pain intermittently for several months.  Does not remember when had PSA done last/exam.  No weight loss, 215 pounds.  Pain across both sides lower back as well as left flank/pelvis.  No urinary issues, no neurological issues    Has been smoking since teens; no recent lung evaluation -scan in ER was just AP    Pertinent Medical History      04/15/25:      Review of Systems   Constitutional:  Positive for fatigue.   HENT: Negative.     Respiratory: Negative.     Cardiovascular: Negative.    Gastrointestinal: Negative.    Genitourinary: Negative.    Musculoskeletal:  Positive for arthralgias and back pain.   Neurological: Negative.    Hematological: Negative.    Psychiatric/Behavioral: Negative.             Objective    There were no vitals taken for this visit.    Pain Screening:     ECOG   0-1  Physical Exam  Vitals reviewed.   HENT:      Head: Normocephalic.      Mouth/Throat:      Mouth: Mucous membranes are moist.      Pharynx: Oropharynx is clear.   Cardiovascular:      Rate and Rhythm: Normal rate.      Pulses: Normal pulses.   Pulmonary:      Effort: Pulmonary effort is normal.   Abdominal:      Palpations: Abdomen is soft.   Musculoskeletal:         General: Normal range of motion.      Cervical back: Normal range of motion.   Skin:     General: Skin is warm.   Neurological:      General: No focal deficit present.      Mental Status: He is alert.   Psychiatric:         Mood and Affect: Mood normal.         Labs: I have reviewed the following labs:  Lab Results   Component Value Date/Time    WBC 11.07 (H) 04/14/2025 04:35 AM    RBC 4.42 04/14/2025 04:35 AM    Hemoglobin 14.1 04/14/2025 04:35 AM    Hematocrit 41.4 04/14/2025 04:35 AM    MCV 94 04/14/2025 04:35 AM    MCH 31.9 04/14/2025 04:35 AM    RDW 13.5 04/14/2025 04:35 AM    Platelets 384 04/14/2025 04:35 AM    Segmented % 71 04/14/2025 04:35 AM    Lymphocytes % 14 04/14/2025 04:35 AM    Monocytes % 10 04/14/2025 04:35 AM    Eosinophils Relative 3 04/14/2025 04:35 AM    Basophils Relative 1 04/14/2025 04:35 AM    Immature Grans % 1 04/14/2025 04:35 AM    Absolute Neutrophils 8.01 (H) 04/14/2025 04:35 AM     Lab Results   Component Value Date/Time    Potassium 3.9 04/14/2025 04:35 AM    Chloride 105 04/14/2025 04:35 AM    CO2 26 04/14/2025 04:35 AM    BUN 17 04/14/2025 04:35 AM    Creatinine 0.97 04/14/2025 04:35 AM    Calcium 9.2 04/14/2025 04:35 AM    AST 16 04/14/2025 04:35 AM    ALT 13 04/14/2025 04:35 AM    Alkaline Phosphatase 128 (H) 04/14/2025 04:35 AM    Total Protein 7.8 04/14/2025 04:35 AM    Albumin 4.2 04/14/2025 04:35 AM    Total Bilirubin 0.42 04/14/2025 04:35 AM    eGFR 73 04/14/2025 04:35 AM           Administrative Statements   I have spent a total  time of 60 minutes in caring for this patient on the day of the visit/encounter including Impressions, Counseling / Coordination of care, Documenting in the medical record, Reviewing/placing orders in the medical record (including tests, medications, and/or procedures), and Obtaining or reviewing history  .

## 2025-04-16 ENCOUNTER — CONSULT (OUTPATIENT)
Age: 80
End: 2025-04-16
Attending: EMERGENCY MEDICINE
Payer: COMMERCIAL

## 2025-04-16 VITALS
WEIGHT: 215 LBS | BODY MASS INDEX: 31.84 KG/M2 | SYSTOLIC BLOOD PRESSURE: 186 MMHG | RESPIRATION RATE: 18 BRPM | HEIGHT: 69 IN | TEMPERATURE: 98.2 F | HEART RATE: 90 BPM | OXYGEN SATURATION: 96 % | DIASTOLIC BLOOD PRESSURE: 88 MMHG

## 2025-04-16 DIAGNOSIS — R59.9 ADENOPATHY: ICD-10-CM

## 2025-04-16 DIAGNOSIS — C79.51 METASTATIC CANCER TO BONE (HCC): ICD-10-CM

## 2025-04-16 DIAGNOSIS — M89.8X9 LYTIC BONE LESIONS ON XRAY: Primary | ICD-10-CM

## 2025-04-16 PROCEDURE — 99205 OFFICE O/P NEW HI 60 MIN: CPT | Performed by: INTERNAL MEDICINE

## 2025-04-16 NOTE — PATIENT INSTRUCTIONS
Labs tomorrow    PET scan in 2 weeks    MRI thoracic and lumbar spine in 1-2 weeks-needs open MRI

## 2025-04-17 ENCOUNTER — OFFICE VISIT (OUTPATIENT)
Dept: NEUROSURGERY | Facility: CLINIC | Age: 80
End: 2025-04-17
Payer: COMMERCIAL

## 2025-04-17 ENCOUNTER — APPOINTMENT (OUTPATIENT)
Dept: LAB | Facility: AMBULARY SURGERY CENTER | Age: 80
End: 2025-04-17
Payer: COMMERCIAL

## 2025-04-17 VITALS
TEMPERATURE: 98.5 F | DIASTOLIC BLOOD PRESSURE: 70 MMHG | HEIGHT: 69 IN | OXYGEN SATURATION: 92 % | BODY MASS INDEX: 31.84 KG/M2 | SYSTOLIC BLOOD PRESSURE: 148 MMHG | WEIGHT: 215 LBS | HEART RATE: 96 BPM | RESPIRATION RATE: 18 BRPM

## 2025-04-17 DIAGNOSIS — M54.50 ACUTE LOW BACK PAIN: ICD-10-CM

## 2025-04-17 DIAGNOSIS — I10 ESSENTIAL HYPERTENSION: ICD-10-CM

## 2025-04-17 DIAGNOSIS — M48.54XA PATHOLOGIC COMPRESSION FRACTURE OF THORACIC VERTEBRA, INITIAL ENCOUNTER (HCC): ICD-10-CM

## 2025-04-17 DIAGNOSIS — C79.51 METASTATIC CANCER TO BONE (HCC): ICD-10-CM

## 2025-04-17 DIAGNOSIS — M48.56XA PATHOLOGICAL COMPRESSION FRACTURE OF LUMBAR VERTEBRA, INITIAL ENCOUNTER (HCC): Primary | ICD-10-CM

## 2025-04-17 DIAGNOSIS — S22.000A COMPRESSION FRACTURE OF BODY OF THORACIC VERTEBRA (HCC): ICD-10-CM

## 2025-04-17 DIAGNOSIS — E78.2 MIXED HYPERLIPIDEMIA: ICD-10-CM

## 2025-04-17 DIAGNOSIS — M89.8X9 LYTIC BONE LESIONS ON XRAY: ICD-10-CM

## 2025-04-17 LAB
ALBUMIN SERPL BCG-MCNC: 4.1 G/DL (ref 3.5–5)
ALP SERPL-CCNC: 139 U/L (ref 34–104)
ALT SERPL W P-5'-P-CCNC: 20 U/L (ref 7–52)
ANION GAP SERPL CALCULATED.3IONS-SCNC: 11 MMOL/L (ref 4–13)
AST SERPL W P-5'-P-CCNC: 19 U/L (ref 13–39)
BASOPHILS # BLD AUTO: 0.09 THOUSANDS/ÂΜL (ref 0–0.1)
BASOPHILS NFR BLD AUTO: 1 % (ref 0–1)
BILIRUB SERPL-MCNC: 0.41 MG/DL (ref 0.2–1)
BUN SERPL-MCNC: 17 MG/DL (ref 5–25)
CALCIUM SERPL-MCNC: 9.4 MG/DL (ref 8.4–10.2)
CHLORIDE SERPL-SCNC: 103 MMOL/L (ref 96–108)
CHOLEST SERPL-MCNC: 173 MG/DL (ref ?–200)
CO2 SERPL-SCNC: 24 MMOL/L (ref 21–32)
CREAT SERPL-MCNC: 1 MG/DL (ref 0.6–1.3)
EOSINOPHIL # BLD AUTO: 0.43 THOUSAND/ÂΜL (ref 0–0.61)
EOSINOPHIL NFR BLD AUTO: 5 % (ref 0–6)
ERYTHROCYTE [DISTWIDTH] IN BLOOD BY AUTOMATED COUNT: 13.7 % (ref 11.6–15.1)
GFR SERPL CREATININE-BSD FRML MDRD: 70 ML/MIN/1.73SQ M
GLUCOSE P FAST SERPL-MCNC: 92 MG/DL (ref 65–99)
HCT VFR BLD AUTO: 45.1 % (ref 36.5–49.3)
HDLC SERPL-MCNC: 33 MG/DL
HGB BLD-MCNC: 14.1 G/DL (ref 12–17)
IGA SERPL-MCNC: 516 MG/DL (ref 66–433)
IGG SERPL-MCNC: 1051 MG/DL (ref 635–1741)
IGM SERPL-MCNC: 51 MG/DL (ref 45–281)
IMM GRANULOCYTES # BLD AUTO: 0.05 THOUSAND/UL (ref 0–0.2)
IMM GRANULOCYTES NFR BLD AUTO: 1 % (ref 0–2)
LDH SERPL-CCNC: 172 U/L (ref 140–271)
LDLC SERPL CALC-MCNC: 104 MG/DL (ref 0–100)
LYMPHOCYTES # BLD AUTO: 1.67 THOUSANDS/ÂΜL (ref 0.6–4.47)
LYMPHOCYTES NFR BLD AUTO: 19 % (ref 14–44)
MAGNESIUM SERPL-MCNC: 2 MG/DL (ref 1.9–2.7)
MCH RBC QN AUTO: 30.9 PG (ref 26.8–34.3)
MCHC RBC AUTO-ENTMCNC: 31.3 G/DL (ref 31.4–37.4)
MCV RBC AUTO: 99 FL (ref 82–98)
MONOCYTES # BLD AUTO: 0.77 THOUSAND/ÂΜL (ref 0.17–1.22)
MONOCYTES NFR BLD AUTO: 9 % (ref 4–12)
NEUTROPHILS # BLD AUTO: 5.77 THOUSANDS/ÂΜL (ref 1.85–7.62)
NEUTS SEG NFR BLD AUTO: 65 % (ref 43–75)
NONHDLC SERPL-MCNC: 140 MG/DL
NRBC BLD AUTO-RTO: 0 /100 WBCS
PLATELET # BLD AUTO: 436 THOUSANDS/UL (ref 149–390)
PMV BLD AUTO: 9.1 FL (ref 8.9–12.7)
POTASSIUM SERPL-SCNC: 4 MMOL/L (ref 3.5–5.3)
PROT SERPL-MCNC: 7.5 G/DL (ref 6.4–8.4)
PSA SERPL-MCNC: 686.36 NG/ML (ref 0–4)
RBC # BLD AUTO: 4.57 MILLION/UL (ref 3.88–5.62)
SODIUM SERPL-SCNC: 138 MMOL/L (ref 135–147)
TESTOST SERPL-MSCNC: 223 NG/DL (ref 175–781)
TRIGL SERPL-MCNC: 180 MG/DL (ref ?–150)
TSH SERPL DL<=0.05 MIU/L-ACNC: 3.06 UIU/ML (ref 0.45–4.5)
WBC # BLD AUTO: 8.78 THOUSAND/UL (ref 4.31–10.16)

## 2025-04-17 PROCEDURE — 83735 ASSAY OF MAGNESIUM: CPT

## 2025-04-17 PROCEDURE — 82232 ASSAY OF BETA-2 PROTEIN: CPT

## 2025-04-17 PROCEDURE — 99204 OFFICE O/P NEW MOD 45 MIN: CPT | Performed by: NURSE PRACTITIONER

## 2025-04-17 PROCEDURE — 80061 LIPID PANEL: CPT

## 2025-04-17 PROCEDURE — 83521 IG LIGHT CHAINS FREE EACH: CPT

## 2025-04-17 PROCEDURE — 84403 ASSAY OF TOTAL TESTOSTERONE: CPT

## 2025-04-17 PROCEDURE — 83615 LACTATE (LD) (LDH) ENZYME: CPT

## 2025-04-17 PROCEDURE — 84153 ASSAY OF PSA TOTAL: CPT

## 2025-04-17 PROCEDURE — 82784 ASSAY IGA/IGD/IGG/IGM EACH: CPT

## 2025-04-17 PROCEDURE — 36415 COLL VENOUS BLD VENIPUNCTURE: CPT

## 2025-04-17 PROCEDURE — 86334 IMMUNOFIX E-PHORESIS SERUM: CPT

## 2025-04-17 PROCEDURE — 80053 COMPREHEN METABOLIC PANEL: CPT

## 2025-04-17 PROCEDURE — 84443 ASSAY THYROID STIM HORMONE: CPT

## 2025-04-17 PROCEDURE — 85025 COMPLETE CBC W/AUTO DIFF WBC: CPT

## 2025-04-17 PROCEDURE — 84165 PROTEIN E-PHORESIS SERUM: CPT

## 2025-04-17 RX ORDER — POLYETHYLENE GLYCOL 3350 17 G/17G
17 POWDER, FOR SOLUTION ORAL AS NEEDED
COMMUNITY

## 2025-04-17 NOTE — PROGRESS NOTES
Name: Ranulfo Krishnamurthy      : 1945      MRN: [id number]  Encounter Provider: YAQUELIN Limon  Encounter Date: 2025   Encounter department: [facility]  :  Assessment & Plan  Metastatic cancer to bone (HCC)  As addressed in HPI  Orders:    Ambulatory Referral to Neurosurgery    XR spine thoracolumbar 2 vw; Future    XR spine thoracolumbar 2 vw; Future    Compression fracture of body of thoracic vertebra (HCC)  As addressed in HPI  Orders:    Ambulatory Referral to Neurosurgery    Pathological compression fracture of lumbar vertebra, initial encounter (HCC)  As addressed in HPI  Initial appoint ement 3 days post dx injury   Brace _REFUSES remarked I will discuss with my doctor   Pain/Pian med treating with oxycodone , acetaminophen, motrin,   Patient explained med regimen-Oxycodone   ever 8 hours Take 1 tablet (5 mg total) by mouth every 8 (eight) hours as needed for severe pain for up to 12 days Max Daily Amount: 15 mg -   3/4 10, Motrin ---  800 mg  2 per days, Tylenol --- 500 mg tab one to 2 per days.  Denies pain in midline spin palpation  thoracolumbar but admitted to pain in right lateral mid lumbar area.   Reports he has no pain , hsi pain today 3/10 is r/t  to our transport w/c. He reports at onset pain severity was 10/10.  Reports using a walker in home  has gliders and wheels, never used before this incident walked independently.         Imagining   2025 CT Recon only lumbar spine--- Metastatic bony lesions in the lower thoracic and lumbar spine with pathologic compression fracture at T11 and mass extending in the canal at L2. Evaluation with MRI of the lumbar spine with contrast is recommended. Lower thoracic spine should be included versus obtaining a separate MRI of the thoracic spine.    Plan    Explained x-ray imagining, ---refuses .  imagining ordered for today and due again in 4 weeks May  15 2025  --remarked  I will discuss with my Dr. KHAN  thoracic and lumbar ordered by oncologist as per patient, Family reports he will only do open air and are already scheduled form May  5th and 6th. Patient will not do closed MRI.  Explained protocol for the conservative management of compression fracture wear brace for approximately 8 -12 weeks, serial x-ray at specified intervals.   PATIENT REFUSES BRACE, REMARKED HE WILL DISCUSS WITH HIS Dr.   Explained thoracic and lumbar spine precautions to prevent fracture progression and comfort.  After explaining activity restrictions daughter reports patient is nearly complying doing that ,everyone is waiting on him at home.    Explained the effect of NSAID on bone growth  given effect on bone growth.      Pain regimen as discussed above.   Advised of red flag symptoms, if he/she were to develop go to the to go to closest ED for assessment, uncontrolled back or leg pain, leg weakness, paresthesia, bowel or bladder issues, ambulatory dysfunction.      Contact neurosurgery with additional questions or concerns     Instructions for continued care documented in AVS.         Metrics Compression Fracture   Date 4/17/2025 RM LBP 11/24, ODQ12/45  [id number] [id number] = 0584 Your Health State Today 80% VAS 10/10 , today 3/10    Orders:    XR spine thoracolumbar 2 vw; Future    XR spine thoracolumbar 2 vw; Future    Pathologic compression fracture of thoracic vertebra, initial encounter (HCC)  As addressed in HPI  Orders:    XR spine thoracolumbar 2 vw; Future    XR spine thoracolumbar 2 vw; Future        History of Present Illness     Ranulfo Krishnamurthy is a 79 y.o. male who presents Initial neurosurgery visit pathologic  compression fractures. HO metastatic cancer bone.    HPI   As per provider notes presented to  ED on April 14, 2025. Patient presents with right-sided lower back pain that has persisted for the past week. Patient states that week prior he was out fishing and may have overly exerted himself. Patient attempted to treat  back pain with Aleve OTC with moderate improvement in pain. However, earlier this morning at approximately 1:30 AM, patient woke up with significant back pain, prompting him to come to the ED.   Remained in ED 4 hours   ED referred to Neurosurgery and hematology/oncology     Hematology 4/16/2025 as per provider note   To heme-onc for workup.  CT/AP-BONES: Multiple lytic bony lesions at T10, T11, L1 and L2 with paravertebral soft tissue swelling at T11.   Pathologic compression fracture is seen at T11. There is lytic bony lesion with mass extending in the canal at L2 causing canal stenosis. Large lytic bony lesion is seen in the left iliac bone 302/116.    This patient presents with back pain most consistent with compression fractures of the thoracic vertebrae concerning for spinal mets of malignancy with unknown primary. Patient advised to continue pain management with OTC ibuprofen and Tylenol, patient provided course of oxycodone for breakthrough pain.     He presents today for initial neurosurgery appointment.      Review of Systems   Gastrointestinal:  Positive for constipation.   Genitourinary:  Negative for enuresis.   Musculoskeletal:  Positive for back pain (lbp across the back worse on right side, minor in right hip) and gait problem (uses walker to ambulate). Negative for myalgias.   Neurological:  Negative for weakness and numbness.   Hematological:  Bruises/bleeds easily (asa81).   All other systems reviewed and are negative.    I have personally reviewed the MA's review of systems and made changes as necessary.    Past Medical History   Past Medical History:   Diagnosis Date    Back pain      History reviewed. No pertinent surgical history.  Family History   Problem Relation Age of Onset    Cancer Mother     Heart attack Father 50     he reports that he has been smoking cigarettes. He has been exposed to tobacco smoke. He has never used smokeless tobacco. He reports current alcohol use. He reports that  "he does not use drugs.  Current Outpatient Medications   Medication Instructions    acetaminophen (TYLENOL) 1,000 mg, Oral, Every 8 hours PRN    amLODIPine (NORVASC) 5 mg, Oral, 2 times daily    aspirin (ECOTRIN LOW STRENGTH) 81 mg, Daily    atorvastatin (LIPITOR) 20 mg, Oral, Daily    docusate sodium (COLACE) 100 mg, Oral, 3 times daily PRN    fish oil 1,000 mg, Daily    ibuprofen (MOTRIN) 800 mg, Oral, Every 8 hours PRN    oxyCODONE (ROXICODONE) 5 mg, Oral, Every 8 hours PRN    polyethylene glycol (MIRALAX) 17 g, As needed     Allergies   Allergen Reactions    Lisinopril Cough      Objective   /70 (BP Location: Right arm, Patient Position: Sitting, Cuff Size: Standard)   Pulse 96   Temp 98.5 °F (36.9 °C) (Temporal)   Resp 18   Ht 5' 8.7\" (1.745 m)   Wt 97.5 kg (215 lb)   SpO2 92%   BMI 32.03 kg/m²     Physical Exam  Vitals and nursing note reviewed. Exam conducted with a chaperone present (children daughter and son).   Pulmonary:      Effort: Pulmonary effort is normal. No respiratory distress.   Musculoskeletal:      Cervical back: Normal range of motion.      Right lower leg: No edema.      Left lower leg: No edema.      Comments: Enforced activity limitation   Neurological:      Mental Status: He is alert and oriented to person, place, and time.      Motor: No weakness.      Gait: Gait abnormal (wheel chair).   Psychiatric:         Mood and Affect: Mood normal.         Behavior: Behavior normal.       Neurological Exam  Mental Status  Alert. Oriented to person, place, time and situation. Oriented to person, place, and time.    Gait   Abnormal gait (wheel chair).  Wheel chair.    [email or web address]  Radiology Results Review: I have reviewed radiology reports from [email or web address] including: CT abdomen/pelvis.    Administrative Statements   I have spent a total time of 45 minutes in caring for this patient on the day of the visit/encounter including Diagnostic results, Risks and benefits of tx options, " Instructions for management, Patient and family education, Importance of tx compliance, Risk factor reductions, Impressions, Counseling / Coordination of care, Documenting in the medical record, Reviewing/placing orders in the medical record (including tests, medications, and/or procedures), Obtaining or reviewing history  , and Communicating with other healthcare professionals .

## 2025-04-17 NOTE — PATIENT INSTRUCTIONS
Patient Education Compression fracture lumbar thoracic Instructions for Continued care.     Will wear brace for approximately 8-12 weeks, serial x-rays at every visit to assess healing of lumbar fracture  REFUSED BRACE   Pain control as per OTC medications use as needed for pain, acetaminophen 500 mg po every 4 hours or 1000 mg (2 tabs) every 8 hours. - HE IS ALSO TALKING  IBUPROFEN,  OXYCODONE    Thoracic and lumbar spine precautions and rational to prevent fracture progression no bending at the waist , no lifting greater than 10 LBS a gallon of milk weighs approximately 10 lbs , do not carry a purse or bag weighing greater than 10 - 20 lbs.    No bending, pushing, pulling, trunk twisting, overhead or forward stretching until cleared by neurosurgery    Follow-up in after completion of MRI lumbar AND THORACIC  ORDERED BY pcp  ----SCHEDULED OPEN AIR  REFUSES  CLOSED MRI   Follow up sooner or go to closest ED uncontrolled back or leg pain, leg weakness, paresthesia (numbness or tingling worsening for baseline), bowel (incontinence) or bladder (retention) issues, ambulatory dysfunction.

## 2025-04-18 DIAGNOSIS — C79.51 METASTATIC CANCER TO BONE (HCC): ICD-10-CM

## 2025-04-18 DIAGNOSIS — M54.50 ACUTE LOW BACK PAIN: ICD-10-CM

## 2025-04-18 LAB
ALBUMIN SERPL ELPH-MCNC: 3.72 G/DL (ref 3.2–5.1)
ALBUMIN SERPL ELPH-MCNC: 52.4 % (ref 48–70)
ALPHA1 GLOB SERPL ELPH-MCNC: 0.4 G/DL (ref 0.15–0.47)
ALPHA1 GLOB SERPL ELPH-MCNC: 5.7 % (ref 1.8–7)
ALPHA2 GLOB SERPL ELPH-MCNC: 0.9 G/DL (ref 0.42–1.04)
ALPHA2 GLOB SERPL ELPH-MCNC: 12.7 % (ref 5.9–14.9)
BETA GLOB ABNORMAL SERPL ELPH-MCNC: 0.42 G/DL (ref 0.31–0.57)
BETA1 GLOB SERPL ELPH-MCNC: 5.9 % (ref 4.7–7.7)
BETA2 GLOB SERPL ELPH-MCNC: 6.9 % (ref 3.1–7.9)
BETA2+GAMMA GLOB SERPL ELPH-MCNC: 0.49 G/DL (ref 0.2–0.58)
GAMMA GLOB ABNORMAL SERPL ELPH-MCNC: 1.16 G/DL (ref 0.4–1.66)
GAMMA GLOB SERPL ELPH-MCNC: 16.4 % (ref 6.9–22.3)
IGG/ALB SER: 1.1 {RATIO} (ref 1.1–1.8)
M PROTEIN 1 SERPL ELPH-MCNC: 0.32 G/DL
PROT SERPL-MCNC: 7.1 G/DL (ref 6.4–8.4)

## 2025-04-18 PROCEDURE — 84165 PROTEIN E-PHORESIS SERUM: CPT | Performed by: PATHOLOGY

## 2025-04-18 PROCEDURE — 86334 IMMUNOFIX E-PHORESIS SERUM: CPT | Performed by: PATHOLOGY

## 2025-04-18 RX ORDER — OXYCODONE HYDROCHLORIDE 5 MG/1
5 TABLET ORAL EVERY 8 HOURS PRN
Qty: 12 TABLET | Refills: 0 | Status: SHIPPED | OUTPATIENT
Start: 2025-04-18 | End: 2025-04-30

## 2025-04-18 NOTE — TELEPHONE ENCOUNTER
Patient daughter called to request a refill for their oxyCODONE (Roxicodone) 5 immediate release tablet  advised a refill was requested on 4/17 and is pending approval. Daughter verbalized understanding and is in agreement.     Does the patient have enough for 3 days?   [] Yes   [x] No - Send as HP to POD

## 2025-04-18 NOTE — TELEPHONE ENCOUNTER
Patient son Called in requesting refill to be sent to Dr. Salas because she told them if he needs the medication until pain management call them to make the appointment she would refill this medication    Reason for call:   [x] Refill   [] Prior Auth  [] Other:     Office:   [] PCP/Provider -   [x] Specialty/Provider - Chyna Salas    Medication: oxyCODONE (Roxicodone) 5 immediate release tablet    Dose/Frequency:  Take 1 tablet (5 mg total) by mouth every 8 (eight) hours as needed for severe pain     Quantity: 12 tablet     Pharmacy: [facility]/pharmacy #[id number]     Does the patient have enough for 3 days?   [] Yes   [x] No - Send as HP to POD

## 2025-04-18 NOTE — TELEPHONE ENCOUNTER
1 [id number] 04/14/2025 04/14/2025 oxyCODONE HCL (Tablet) 12.0 4 5 MG 22.50 CARMEL [place] [facility] Medicare 0 / 0 [place]

## 2025-04-19 LAB
B2 MICROGLOB SERPL-MCNC: 3.2 MG/L (ref 0.6–2.4)
KAPPA LC FREE SER-MCNC: 75.2 MG/L (ref 3.3–19.4)
KAPPA LC FREE/LAMBDA FREE SER: 1.65 {RATIO} (ref 0.26–1.65)
LAMBDA LC FREE SERPL-MCNC: 45.7 MG/L (ref 5.7–26.3)

## 2025-04-20 RX ORDER — OXYCODONE HYDROCHLORIDE 5 MG/1
5 TABLET ORAL EVERY 8 HOURS PRN
Qty: 90 TABLET | Refills: 0 | Status: SHIPPED | OUTPATIENT
Start: 2025-04-20 | End: 2025-05-20

## 2025-04-26 DIAGNOSIS — R52 PAIN: ICD-10-CM

## 2025-04-28 RX ORDER — IBUPROFEN 800 MG/1
800 TABLET, FILM COATED ORAL EVERY 8 HOURS PRN
Qty: 30 TABLET | Refills: 0 | OUTPATIENT
Start: 2025-04-28

## 2025-05-01 ENCOUNTER — HOSPITAL ENCOUNTER (OUTPATIENT)
Dept: RADIOLOGY | Age: 80
Discharge: HOME | End: 2025-05-01
Attending: INTERNAL MEDICINE
Payer: COMMERCIAL

## 2025-05-01 DIAGNOSIS — C79.51 METASTATIC CANCER TO BONE (HCC): ICD-10-CM

## 2025-05-01 LAB — GLUCOSE SERPL-MCNC: 108 MG/DL (ref 65–140)

## 2025-05-01 PROCEDURE — 78815 PET IMAGE W/CT SKULL-THIGH: CPT

## 2025-05-01 PROCEDURE — A9552 F18 FDG: HCPCS

## 2025-05-01 PROCEDURE — 82948 REAGENT STRIP/BLOOD GLUCOSE: CPT

## 2025-05-05 ENCOUNTER — TELEPHONE (OUTPATIENT)
Age: 80
End: 2025-05-05

## 2025-05-05 DIAGNOSIS — C79.51 METASTATIC CANCER TO BONE (HCC): Primary | ICD-10-CM

## 2025-05-05 RX ORDER — LORAZEPAM 0.5 MG/1
0.5 TABLET ORAL ONCE
Qty: 1 TABLET | Refills: 0 | Status: SHIPPED | OUTPATIENT
Start: 2025-05-06 | End: 2025-05-06

## 2025-05-05 NOTE — TELEPHONE ENCOUNTER
Called and spoke to Colby. Dr. Salas would still like him to get his MRIs completed. We can order him ativan to take prior to see if that helps. Colby states he will not have time to get the ativan today prior to the MRI. Asking if he should reschedule. I let him know he can reschedule, but I am not sure how soon he would be able to get back in. He has an MRI tomorrow as well that I can order the ativan for. I will let him know if his appointment with Dr. Salas on Wednesday needs to be pushed out or not until after both of his MRIs are completed. Colby voiced understanding and will call to cancel his MRI today.

## 2025-05-05 NOTE — PROGRESS NOTES
Name: Ranulfo Krishnamurthy      : 1945      MRN: [id number]  Encounter Provider: Chyna Salas MD  Encounter Date: 2025   Encounter department: [facility]  :  Assessment & Plan      Pt is a 79 y.o. male with PMHX HTN, arthritis, PVD who presented to the ED on 2025. Patient presents with right-sided lower back pain that has persisted for the past week.  Patient states that week prior he was out fishing and may have overly exerted himself.      Found to have lytic bone lesions on Xray-no workup done at time and referred to Heme Onc    Lytic bone lesions on xray     Seen in ER for back pain 2025      Referred to Heme Onc for workup      Labs 2025      Na 141 K 3.9 Cr 0.97 Ca 9.2 ALT/AST 13/16 lipase 12 WBC 11.1 Hgb 14.1  MCV 94 plts 384 ANC 8010         CT AP 2024 with the following       ABDOMINOPELVIC CAVITY: No ascites. No pneumoperitoneum.   Enlarged left common iliac node measuring 2.3 x 1.7 cm series 302 image 109   5.3 x 3.8 x 6.4 cm left external iliac node series 302   image 131.   Multiple slightly prominent mesenteric nodes are seen, the largest with a short axis of 12 mm series 302 image 56.      VESSELS: Atherosclerosis without abdominal aortic aneurysm.   Ectasia of the infrarenal abdominal aorta measuring 2.8 cm.      PELVIS      REPRODUCTIVE ORGANS: Enlarged prostate. Bulging of the left posterior mid gland.   Right hydrocele.         URINARY BLADDER: Unremarkable.      ABDOMINAL WALL/INGUINAL REGIONS: Unremarkable.      BONES: Multiple lytic bony lesions at T10, T11, L1 and L2 with paravertebral soft tissue swelling at T11.   Pathologic compression fracture is seen at T11. There is lytic bony lesion with mass extending in the canal at L2 causing canal stenosis.   Large lytic bony lesion is seen in the left iliac bone 302/116.      IMPRESSION:         1. Left common iliac and external iliac adenopathy with  nonspecific mildly prominent mesenteric nodes. Differential diagnosis includes metastases from prostate cancer or other malignancy or lymphoma.   2. Multiple metastatic bony lesions in the thoracic, lumbar spine and left iliac bone with pathologic compression at T11. Mass extending in the canal at L2 causing canal stenosis.   Evaluation with MRI of the lower thoracic thoracic and lumbar spine is recommended.     5/7/2025        Was supposed to have PSMA PET based on markedly elevated PSA but wasn't done despite two requests to change from FDG to PSMA    Will need to repeat PSMA as multiple areas of issues on PET as below    PET was as below 5/1/2025    1. Mildly FDG avid left posterior prostate exophytic lesion, SUV max 3.3, with multiple mildly FDG avid lytic osseous lesions and FDG avid iliac chain lymphadenopathy. Findings favored to represent metastatic prostate cancer. Recommend tissue   confirmation and PSMA PET/CT if clinical appropriate.     2. Left lower lobe 1.9 cm pulmonary nodule without significant FDG activity. Findings are indeterminate and may represent incidental benign lesion, non-FDG avid primary lung malignancy, post infectious/inflammatory process, or additional prostate   metastatic lesion. Follow-up on PSMA PET/CT and tissue sampling as clinically appropriate.     3. FDG avid left parotid 1.8 cm mass suspicious for primary salivary gland neoplasm such as a pleomorphic adenoma or Warthin's tumor. Consider tissue sampling for further evaluation.    Thus there are several areas of concern    Left parotid 1.3x1.8 SUV max 11-will send to ENT/possible biopsy    Chest-1.9x1.6x1.6 LLL nodule SUV 0.9-again, PSMA may have been positive but this is a sizable nodule that may need biopsy   L common iliac 2.2x1.8 cm SUV 2.7    Left external iliac 5.4x3.8.6.3 cm SUV 4.4    Prostatomegaly c/w     Multiple lytic lesions T11,T10, L2     MRI T spine done outside, was unable to do L spine as could  lay flat    Involvement demonstrated in all vertebral bodies with compression T11-no stenosis of the spinal canal.  T10 with pedicle involvement as well as T11.  L1/L2 pedicle involved Mild pathologic wedge compression fracture T11    In addition to above, lab work done with     M protein  IgG kappa M spike 0.32 g/dL Na 138 K 4.0 Cr 1.0 Ca 9.4 Mg 2.0 ALT/AST 20/19 TB 0.41  B2M 3.2  Testosterone 223 WBC 8/8 Hgb 14.1 MCV 99 plts 436     Continues with significant back pain         Summary/Recommendations      Prostate:     ; testosterone 223 will start Lupron/ADT with casodex 50 mg daily for flare.  Lupron for now 7.5 mg IM monthly    Bony lesions/external and internal left iliacs most likely metastatic prostate but will now get PSMA that was requested and not done when PSA was found to be 686    Rad Onc consult for T11/back pain    Will consider bisphosphonate but will hold for now    M spike    IgG kappa paraprotein 0.32 mg/dL-will get baseline bone marrow especially with bony disease which is more likely prostate but could be myeloma defining    Parotid left    Tail palplable on exam 1.3x1.8 cm with SUV 11 (non PSMA) this suggests this is not prostate although not ruled out; may be primary salivary gland, could be metastatic disease    Will have ENT see/biopsy    Lung    1.9 cm LLL nodule; prior smoker, would consider biopsy; will re-evaluate after PSMA PET    Pain-    Mostly bony; using tylenol 500 mg-cautioned on dose ceiling 3 grams    Palliative care consulted as taking oxycodone     Discussed bowel regimen     Follow up     3 weeks          History of Present Illness   No chief complaint on file.    Pt is a 79 y.o. male who presents to the ED on April 14, 2025. Patient presents with right-sided lower back pain that has persisted for the past week.  Patient states that week prior he was out fishing and may have overly exerted himself.  Patient attempted to treat back pain with Aleve OTC  with moderate improvement in pain.  However, earlier this morning at approximately 1:30 AM, patient woke up with significant back pain, prompting him to come to the ED.  Patient states that he still been able to walk independently, however states that walking is limited due to pain.  Patient also recently recovered from what he describes as a stomach bug that he and his son who was at bedside was also recovering from.  Patient states that pain is located to the right side, no radiation to the posterior lateral right leg. Patient otherwise denies any fever, chills, chest pain, current nausea, current vomiting, current diarrhea, urinary distention, urinary incontinence, fecal incontinence, abdominal pain, radiating pain, ambulatory dysfunction, neurologic dysfunction, saddle anesthesia.  Patient also denies any IV drug use, illicit drug use. Still currently smoking.  ROS otherwise negative.  No other concerns at this time.     This patient presents with back pain most consistent with compression fractures of the thoracic vertebrae concerning for spinal mets of malignancy with unknown primary. No back pain red flags on history or physical. Presentation not consistent with cauda equina (no bowel or urinary incontinence/retention, no saddle anesthesia, no distal weakness), AAA, viscus perforation, osteomyelitis or epidural abscess (no IVDU, vertebral tenderness), renal colic, pyelonephritis (afebrile, no CVAT, no urinary symptoms).  Due to imaging findings, patient was provided with ambulatory referral to hematology oncology and neurosurgery.  Patient advised to continue pain management with OTC ibuprofen and Tylenol, patient provided course of oxycodone for breakthrough pain.  Discussed management and discharge plan with patient at bedside, who was in agreement, all questions answered, patient discharged home.           CT  AP 4/14/20225       LOWER CHEST: Subsegmental atelectasis in the left lower lobe.       LIVER/BILIARY TREE: Unremarkable.      GALLBLADDER: No calcified gallstones. No pericholecystic inflammatory change.      SPLEEN: Unremarkable.      PANCREAS: Unremarkable.      ADRENAL GLANDS: Low density lobulated thickening of bilateral adrenal glands, statistically likely due to small adenomas or adenomatous hyperplasia.      KIDNEYS/URETERS: No hydronephrosis. Renal vascular calcifications. Subcentimeter hypoattenuating renal lesion(s), too small to characterize but statistically likely benign, which do not warrant follow-up (Radiology June 2019).      STOMACH AND BOWEL: Stomach is unremarkable.   Small bowel loops show normal caliber.   No inflammatory changes or bowel obstruction.         APPENDIX: Normal.      ABDOMINOPELVIC CAVITY: No ascites. No pneumoperitoneum.   Enlarged left common iliac node measuring 2.3 x 1.7 cm series 302 image 109   5.3 x 3.8 x 6.4 cm left external iliac node series 302   image 131.   Multiple slightly prominent mesenteric nodes are seen, the largest with a short axis of 12 mm series 302 image 56.      VESSELS: Atherosclerosis without abdominal aortic aneurysm.   Ectasia of the infrarenal abdominal aorta measuring 2.8 cm.      PELVIS      REPRODUCTIVE ORGANS: Enlarged prostate. Bulging of the left posterior mid gland.   Right hydrocele.         URINARY BLADDER: Unremarkable.      ABDOMINAL WALL/INGUINAL REGIONS: Unremarkable.      BONES: Multiple lytic bony lesions at T10, T11, L1 and L2 with paravertebral soft tissue swelling at T11.   Pathologic compression fracture is seen at T11. There is lytic bony lesion with mass extending in the canal at L2 causing canal stenosis.   Large lytic bony lesion is seen in the left iliac bone 302/116.      IMPRESSION:         1. Left common iliac and external iliac adenopathy with nonspecific mildly prominent mesenteric nodes. Differential diagnosis includes metastases from prostate cancer or other malignancy or lymphoma.   2. Mul   tiple  metastatic bony lesions in the thoracic, lumbar spine and left iliac bone with pathologic compression at T11. Mass extending in the canal at L2 causing canal stenosis.   Evaluation with MRI of the lower thoracic thoracic and lumbar spine is recommended.         Patient states has had back pain intermittently for several months.  Does not remember when had PSA done last/exam.  No weight loss, 215 pounds.  Pain across both sides lower back as well as left flank/pelvis.  No urinary issues, no neurological issues     Has been smoking since teens; no recent lung evaluation -scan in ER was just AP     5/7/2025    CT PET  5/1/2025      FINDINGS:     VISUALIZED BRAIN:  No acute abnormalities are seen.     HEAD/NECK:  Lack of synchronization between PET and CT images within the head/neck secondary to motion, somewhat limiting evaluation.     FDG avid left parotid mass measuring 1.3 x 1.8 cm, SUV max 11 (series 1200, image 57).     No additional FDG avid lymph nodes/lesions.     CT images: No additional significant findings.     CHEST:  Left lower lobe peripheral nodule measuring 1.9 x 1.6 x 1.6 cm, with FDG activity similar to background soft tissue, SUV max 0.9. This nodule was excluded from the field-of-view on prior exam.     Additional left lower lobe 0.5 cm nodule (series 3, image 141), without significant FDG activity, however this is below the size threshold for FDG evaluation. This nodule was obscured by atelectasis on prior exam.     CT images: Mild emphysematous changes. Patent central airways. Moderate coronary artery calcifications.     ABDOMEN:  Mildly FDG avid left common iliac lymph node measuring 2.2 x 1.8 cm, SUV max 2.7, unchanged in size from prior.     Redemonstrated borderline enlarged mesenteric lymph nodes without significant FDG activity, for example a left mid abdominal mesenteric lymph node measuring 1.2 cm short axis (series 3, image 204). These lymph nodes are grossly unchanged from prior.     CT  images: Low-density lobulated thickening of bilateral adrenal glands, likely adenomatous hyperplasia. Small fat-containing umbilical hernia.     PELVIS:  Large FDG avid left external iliac lymph node measuring 5.4 x 3.8 x 6.3 cm, SUV max 4.4, unchanged in size from prior.     Redemonstrated prostatomegaly with left posterior mid gland bulging/exophytic lesion measuring approximately 2.1 x 2.2 cm, SUV max 3.3. Low-level FDG activity throughout the remainder of the prostate, particularly the inferior prostate, SUV max 2.2.     CT images: Small to moderate right hydrocele.     OSSEOUS STRUCTURES:  Redemonstrated multiple lytic bony lesions demonstrating mild FDG activity with representative examples as below.  - Left iliac bone lytic lesion with minimal FDG activity, SUV max 1.9.  - Focus of mild right posterior iliac bone FDG activity with associated cortical thinning, SUV max 2.3 (series 1200, image 229).  - L2 vertebral body lytic lesion extending into the left pedicle, SUV max 3.3 (series 1200, image 190).  -T11 vertebral body lytic lesions with associated pathologic compression fracture, SUV max 3.5.  - T10 vertebral body lytic lesion extending into the right pedicle and posterior elements, SUV max 2.7 (series 1200, image 152).     CT images: Spinal degenerative changes.     IMPRESSION:     1. Mildly FDG avid left posterior prostate exophytic lesion, SUV max 3.3, with multiple mildly FDG avid lytic osseous lesions and FDG avid iliac chain lymphadenopathy. Findings favored to represent metastatic prostate cancer. Recommend tissue   confirmation and PSMA PET/CT if clinical appropriate.     2. Left lower lobe 1.9 cm pulmonary nodule without significant FDG activity. Findings are indeterminate and may represent incidental benign lesion, non-FDG avid primary lung malignancy, post infectious/inflammatory process, or additional prostate   metastatic lesion. Follow-up on PSMA PET/CT and tissue sampling as clinically  appropriate.     3. FDG avid left parotid 1.8 cm mass suspicious for primary salivary gland neoplasm such as a pleomorphic adenoma or Warthin's tumor. Consider tissue sampling for further evaluation.      Pertinent Medical History      04/15/25:     05/04/25:      Review of Systems   Constitutional:  Positive for fatigue.   HENT: Negative.     Respiratory: Negative.     Cardiovascular: Negative.    Gastrointestinal:  Positive for abdominal pain and constipation.   Genitourinary: Negative.    Musculoskeletal:  Positive for arthralgias, back pain and myalgias.   Neurological: Negative.    Hematological: Negative.    Psychiatric/Behavioral: Negative.             Objective   There were no vitals taken for this visit.    Pain Screening:     ECOG   1  Physical Exam  Vitals reviewed.   HENT:      Head: Normocephalic.      Nose: Nose normal.      Mouth/Throat:      Mouth: Mucous membranes are moist.      Pharynx: Oropharynx is clear.   Eyes:      Pupils: Pupils are equal, round, and reactive to light.   Cardiovascular:      Rate and Rhythm: Normal rate.      Pulses: Normal pulses.   Pulmonary:      Effort: Pulmonary effort is normal.   Abdominal:      General: Bowel sounds are normal.   Musculoskeletal:         General: Normal range of motion.      Cervical back: Normal range of motion.   Neurological:      General: No focal deficit present.      Mental Status: He is alert.         Labs: I have reviewed the following labs:  Lab Results   Component Value Date/Time    WBC 8.78 04/17/2025 07:37 AM    RBC 4.57 04/17/2025 07:37 AM    Hemoglobin 14.1 04/17/2025 07:37 AM    Hematocrit 45.1 04/17/2025 07:37 AM    MCV 99 (H) 04/17/2025 07:37 AM    MCH 30.9 04/17/2025 07:37 AM    RDW 13.7 04/17/2025 07:37 AM    Platelets 436 (H) 04/17/2025 07:37 AM    Segmented % 65 04/17/2025 07:37 AM    Lymphocytes % 19 04/17/2025 07:37 AM    Monocytes % 9 04/17/2025 07:37 AM    Eosinophils Relative 5 04/17/2025 07:37 AM    Basophils Relative 1  04/17/2025 07:37 AM    Immature Grans % 1 04/17/2025 07:37 AM    Absolute Neutrophils 5.77 04/17/2025 07:37 AM     Lab Results   Component Value Date/Time    Potassium 4.0 04/17/2025 07:37 AM    Chloride 103 04/17/2025 07:37 AM    CO2 24 04/17/2025 07:37 AM    BUN 17 04/17/2025 07:37 AM    Creatinine 1.00 04/17/2025 07:37 AM    Glucose, Fasting 92 04/17/2025 07:37 AM    Calcium 9.4 04/17/2025 07:37 AM    AST 19 04/17/2025 07:37 AM    ALT 20 04/17/2025 07:37 AM    Alkaline Phosphatase 139 (H) 04/17/2025 07:37 AM    Total Protein 7.5 04/17/2025 07:37 AM    Total Protein 7.1 04/17/2025 07:37 AM    Albumin 4.1 04/17/2025 07:37 AM    Total Bilirubin 0.41 04/17/2025 07:37 AM    eGFR 70 04/17/2025 07:37 AM           Administrative Statements   I have spent a total time of 40 minutes in caring for this patient on the day of the visit/encounter including Diagnostic results, Impressions, Counseling / Coordination of care, Documenting in the medical record, Reviewing/placing orders in the medical record (including tests, medications, and/or procedures), and Obtaining or reviewing history  .

## 2025-05-05 NOTE — TELEPHONE ENCOUNTER
Provider: Dr. Salas    Patient called in with daughter on the line. 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     He reports he just had a PET scan completed on 5/1 and inquiring if that will suffice. I confirmed with him we would discuss with the provider and return his call. He verbalized understanding and has no additional questions or concerns at this moment.

## 2025-05-07 ENCOUNTER — OFFICE VISIT (OUTPATIENT)
Age: 80
End: 2025-05-07
Payer: COMMERCIAL

## 2025-05-07 ENCOUNTER — TELEPHONE (OUTPATIENT)
Age: 80
End: 2025-05-07

## 2025-05-07 ENCOUNTER — HOSPITAL ENCOUNTER (OUTPATIENT)
Dept: INFUSION CENTER | Facility: HOSPITAL | Age: 80
Discharge: HOME | End: 2025-05-07
Attending: INTERNAL MEDICINE
Payer: COMMERCIAL

## 2025-05-07 ENCOUNTER — DOCUMENTATION (OUTPATIENT)
Dept: HEMATOLOGY ONCOLOGY | Facility: CLINIC | Age: 80
End: 2025-05-07

## 2025-05-07 VITALS
RESPIRATION RATE: 16 BRPM | SYSTOLIC BLOOD PRESSURE: 187 MMHG | DIASTOLIC BLOOD PRESSURE: 93 MMHG | TEMPERATURE: 96.5 F | OXYGEN SATURATION: 97 % | HEART RATE: 93 BPM

## 2025-05-07 VITALS
SYSTOLIC BLOOD PRESSURE: 170 MMHG | RESPIRATION RATE: 18 BRPM | DIASTOLIC BLOOD PRESSURE: 92 MMHG | BODY MASS INDEX: 31.25 KG/M2 | TEMPERATURE: 98.3 F | WEIGHT: 211 LBS | HEIGHT: 69 IN | OXYGEN SATURATION: 96 % | HEART RATE: 88 BPM

## 2025-05-07 DIAGNOSIS — C79.51 METASTATIC CANCER TO BONE (HCC): Primary | ICD-10-CM

## 2025-05-07 DIAGNOSIS — M89.8X9 LYTIC BONE LESIONS ON XRAY: ICD-10-CM

## 2025-05-07 DIAGNOSIS — C61 PROSTATE CANCER (HCC): Primary | ICD-10-CM

## 2025-05-07 DIAGNOSIS — C61 PROSTATE CANCER (HCC): ICD-10-CM

## 2025-05-07 DIAGNOSIS — K11.9 LESION OF PAROTID GLAND: ICD-10-CM

## 2025-05-07 PROCEDURE — 96402 CHEMO HORMON ANTINEOPL SQ/IM: CPT

## 2025-05-07 PROCEDURE — 99215 OFFICE O/P EST HI 40 MIN: CPT | Performed by: INTERNAL MEDICINE

## 2025-05-07 RX ORDER — BICALUTAMIDE 50 MG/1
50 TABLET, FILM COATED ORAL DAILY
Qty: 30 TABLET | Refills: 0 | Status: SHIPPED | OUTPATIENT
Start: 2025-05-07

## 2025-05-07 RX ADMIN — LEUPROLIDE ACETATE 7.5 MG: KIT at 10:35

## 2025-05-07 NOTE — PATIENT INSTRUCTIONS
Information from today's visit:    PSA is 686. Prostate cancer is the most likely diagnosis.   2. Because of the bone lesions, unsure if it is metastatic disease from prostate vs. Pre-myeloma  3. 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 Parotid gland - need to see ENT for biopsy   5. Lesions in the lungs    Treatment for prostate cancer  Lupron injection once a month  We need to get testosterone below 50 (currently 223)  Also, casodex     Questions asked  Radiation vs chemo?  Radiation would be tolerated better ---> some side effects nausea/fatigue.    Radiation is directed to the bony area.     Next to do things   See head/neck ENT  Bone marrow Biopsy (takes 7 to 10 days to return)   PET scan dedicated to the prostate   Radiation oncology     Recommendations   Radiation is recommended as it will help with walking and functioning     Bowel movement regime options  Ducolax, 1 tablet, twice a day  2. Senna, 1 tablet twice a day - you can increase that   3. Milk of Magnesia - follow directions   4. Mag Citrate - 1/2 bottle q 12 hrs     To have a good bowel movement   Drink at least 40 oz - 60 oz of water daily  Fiber -  fresh fruits and vegetables, whole grains    Pain control  Take tylenol (500mg), 2 tabs every 8 hours as needed - take this if pain level is 2-4   2. If pain level is 5 - 10, take an oxycodone  3. IF you are about to have imaging, and pain level is 3 or above, take oxycodone. If lower, okay to take tylenol.

## 2025-05-07 NOTE — PROGRESS NOTES
"Ranulfo Novakarnel  tolerated treatment well with no complications.  BP elevated and per pt he gets \"nervous at DrDonna appointments.\" Took all of his BP medication early am.    Ranulfo Krishnamurthy is aware of future appt on 6/4/2025 at 0830.     AVS printed and given to Ranulfo Krishnamurthy:  Yes         "

## 2025-05-07 NOTE — PROGRESS NOTES
All records needed are in patients chart. No records retrieval needed at this time.     Referral received/ Chart reviewed for work up completed   Referral to: Rad/Onc  Dx: Prostate cancer    Imaging completed: [x][facility] []Other:    [x] PET/CT   [x] MRI   [] CT   [] US   [] Mammo   [] Bone scan   [] N/A    Pathology completed: [][facility] []Other:    Date:   Location:   [x]N/A    Additional records needed:   [] Genomic report   [] Genetic testing results   [] Office Note   [] Procedure/ Operative note   [] Lab results   [x] N/A      [] Radiation Oncology records retrieval needed (PN to route to rad/onc clerical pool once scheduled)  Date:  Location:           PSA Date:        Lab Results   Component Value Date    .360 (H) 04/17/2025

## 2025-05-08 ENCOUNTER — TELEPHONE (OUTPATIENT)
Age: 80
End: 2025-05-08

## 2025-05-08 NOTE — TELEPHONE ENCOUNTER
I called Ranulfo in response to a referral that was received for patient to establish care with Palliative Care    Outreach was made to schedule a consultation.    A consultation was scheduled for patient during this call. Patient is scheduled on 5/28/25 at 8:30am with Dr Grullon at the [facility]    Per scheduling guidelines, patient should be seen within 7 days. Patient declined sooner appointment due to already having an appointment on the same date.  A message will be sent to the Palliative Care Leadership pool in regards to patient's appointment being scheduled outside of guidelines.

## 2025-05-09 ENCOUNTER — TELEPHONE (OUTPATIENT)
Dept: INTERVENTIONAL RADIOLOGY/VASCULAR | Facility: HOSPITAL | Age: 80
End: 2025-05-09

## 2025-05-09 ENCOUNTER — TELEPHONE (OUTPATIENT)
Age: 80
End: 2025-05-09

## 2025-05-09 RX ORDER — SODIUM CHLORIDE 9 MG/ML
75 INJECTION, SOLUTION INTRAVENOUS CONTINUOUS
Status: CANCELLED | OUTPATIENT
Start: 2025-05-09

## 2025-05-09 NOTE — TELEPHONE ENCOUNTER
5/9/25 CALLED PT AND HE STATES HE DOES NOT WANT TO R/S CANCELLED APPT. PT DID NOT HAVE XRAYS T/LSPINE COMPLETED.     Appointment canceled for Ranulfo Krishnamurthy ([id number])   Visit type: OFFICE VISIT SHORT [facility]   5/21/2025 10:30 AM (30 minutes) with YAQUELIN Limon in [facility] [place]      Reason for cancellation: Canceled via MyChart

## 2025-05-13 ENCOUNTER — CONSULT (OUTPATIENT)
Dept: RADIATION ONCOLOGY | Facility: HOSPITAL | Age: 80
End: 2025-05-13
Attending: NURSE PRACTITIONER
Payer: COMMERCIAL

## 2025-05-13 VITALS
TEMPERATURE: 97.9 F | HEART RATE: 84 BPM | DIASTOLIC BLOOD PRESSURE: 78 MMHG | RESPIRATION RATE: 18 BRPM | SYSTOLIC BLOOD PRESSURE: 140 MMHG

## 2025-05-13 DIAGNOSIS — C61 PROSTATE CANCER (HCC): ICD-10-CM

## 2025-05-13 PROCEDURE — 99211 OFF/OP EST MAY X REQ PHY/QHP: CPT | Performed by: RADIOLOGY

## 2025-05-13 PROCEDURE — 99205 OFFICE O/P NEW HI 60 MIN: CPT | Performed by: RADIOLOGY

## 2025-05-13 PROCEDURE — 77263 THER RADIOLOGY TX PLNG CPLX: CPT | Performed by: RADIOLOGY

## 2025-05-13 NOTE — PROGRESS NOTES
Consultation Visit   Name: Ranulfo Krishnamurthy      : 1945      MRN: [id number]  Encounter Provider: Jayden Oswald MD  Encounter Date: 2025   Encounter department: [facility] RADIATION ONCOLOGY  :  Assessment & Plan  Prostate cancer (HCC)  Mr. Krishnamurthy is an 79-year-old male who recently presented to the emergency room with new onset persistent right-sided lower back pain x 1 week.  He was admitted to the hospital with workup revealing extensive osseous metastatic disease and a PSA of 686 consistent with metastatic prostate cancer.  Based on blood work, there is also some concern for potential multiple myeloma and he is scheduled for a bone marrow biopsy tomorrow.  He has already initiated androgen deprivation therapy.  He has been referred to our department for consideration of palliative spine radiation.    At this time, the patient's low back pain is mild, approximately 1 out of 10, but does become more intense with activity.  He has no neurologic symptoms at this time.  We have reviewed the available imaging including his MRI thoracic spine, FDG PET/CT, and CT of the abdomen and pelvis.  There is involvement of essentially every vertebral body.  There is a pathologic compression fracture at T11 and what appears to be mild canal stenosis at L2.  The patient today describes his pain as somewhat lower than L2 and location more in the vicinity of L4/L5.  A PSMA PET/CT is pending for 2025.  We would recommend palliative radiation therapy to include the pathologic compression fracture of T11 as well as the area of canal stenosis at L2.  The treatment field would likely encompass T10-L5.  Treatment would be delivered over the course of 2 weeks, 3000 cGy in 10 fractions.  The associated risks and toxicities of treatment were discussed with the patient in detail, including, but not limited to, fatigue, erythema, nausea, and diarrhea.  He will return for CT planning within the  next week with treatment likely to begin following [facility] Day.  In the meantime he will have a bone marrow biopsy and we will follow-up on the results of his PSMA PET/CT.    Orders:    Ambulatory Referral to Radiation Oncology        History of Present Illness   Chief Complaint   Patient presents with    Consult     Radiation Oncology    Pertinent Medical History   Ranulfo Krishnamurthy 1945 is a 79 y.o. male referred by Dr. Salas for metastatic prostate cancer to discuss palliative radiation to T11/back pain.    Patient with history of HTN, arthritis, PVD who presented to the ED on April 14, 2025 with right-sided lower back pain that persisted for a week. Patient reported that week prior he was out fishing and may have overly exerted himself. Patient attempted to treat back pain with Aleve with moderate improvement in pain. Patient woke up in the middle of the night with significant back pain, prompting him to go to the ED. Imaging in the ED revealed Multiple metastatic bony lesions in the thoracic, lumbar spine and left iliac bone with pathologic compression at T11. Mass extending in the canal at L2 causing canal stenosis. He was referred to Neurosurgery and hematology/oncology. PSA is elevated at 686. He consulted with Dr. Salas and is scheduled for bone marrow bx on 5/14/25 to r/o multiple myeloma and PSMA PET scan on 5/21/25 (had PET on 5/1/25 but not PSMA). He is scheduled to start Lupron on 6/4/25 and is referred to discuss palliative radiation to T11.       4/14/25 CT abdomen pelvis  1. Left common iliac and external iliac adenopathy with nonspecific mildly prominent mesenteric nodes. Differential diagnosis includes metastases from prostate cancer or other malignancy or lymphoma.   2. Multiple metastatic bony lesions in the thoracic, lumbar spine and left iliac bone with pathologic compression at T11. Mass extending in the canal at L2 causing canal stenosis.   Evaluation with MRI of the lower  "thoracic thoracic and lumbar spine is recommended.     4/14/25 CT recon only lumbar spine  1. 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 Evaluation with MRI of the lumbar spine with contrast is recommended. Lower thoracic spine should be included versus obtaining a separate MRI of the thoracic spine.  2. Left common iliac and left external iliac adenopathy which could be related to metastasis or lymphoproliferative disease.         PSA   Latest Ref Rng 0.000 - 4.000 ng/mL   4/17/2025 686.360 (H)         4/17/25 Neurosurgery  Explained x-ray imagining, --- patient refuses.  imagining ordered for today and due again in 4 weeks May  15 2025  --remarked  \"will discuss with my Dr.\"  MRI thoracic and lumbar ordered by oncologist as per patient, Family reports he will only do open air and are already scheduled form May  5th and 6th. Patient will not do closed MRI.  Explained protocol for the conservative management of compression fracture wear brace for approximately 8 -12 weeks, serial x-ray at specified intervals.   PATIENT REFUSES BRACE, REMARKED HE WILL DISCUSS WITH HIS Dr.      5/1/25 PET/CT  1. Mildly FDG avid left posterior prostate exophytic lesion, SUV max 3.3, with multiple mildly FDG avid lytic osseous lesions and FDG avid iliac chain lymphadenopathy. Findings favored to represent metastatic prostate cancer. Recommend tissue confirmation and PSMA PET/CT if clinical appropriate.   2. Left lower lobe 1.9 cm pulmonary nodule without significant FDG activity. Findings are indeterminate and may represent incidental benign lesion, non-FDG avid primary lung malignancy, post infectious/inflammatory process, or additional prostate metastatic lesion. Follow-up on PSMA PET/CT and tissue sampling as clinically appropriate.   3. FDG avid left parotid 1.8 cm mass suspicious for primary salivary gland neoplasm such as a " pleomorphic adenoma or Warthin's tumor. Consider tissue sampling for further evaluation.       25 MRI thoracic spine ([facility])  Multilevel thoracic metastatic disease as well as lumbar  Mild pathologic wedge compression fracture of T11      25 Med Onc, Dr. Salas  ; testosterone 223 will start Lupron/ADT with casodex 50 mg daily for flare.  Lupron for now 7.5 mg IM monthly   Bony lesions/external and internal left iliacs most likely metastatic prostate but will now get PSMA that was requested and not done when PSA was found to be 686   Rad Onc consult for T11/back pain   Will consider bisphosphonate but will hold for now   M spike: IgG kappa paraprotein 0.32 mg/dL-will get baseline bone marrow especially with bony disease which is more likely prostate but could be myeloma defining   Parotid left: Tail palplable on exam 1.3x1.8 cm with SUV 11 (non PSMA) this suggests this is not prostate although not ruled out; may be primary salivary gland, could be metastatic disease   Will have ENT see/biopsy   Lun.9 cm LLL nodule; prior smoker, would consider biopsy; will re-evaluate after PSMA PET  Pain-Mostly bony; using tylenol 500 mg  Palliative care consulted as taking oxycodone    Follow up 3 weeks       Upcomin25 IR bone marrow biopsy  25 PSMA PET/CT  25 Dr. Salas follow-up  25 Palliative care  25 Infusion    Oncology History   Cancer Staging   No matching staging information was found for the patient.  Oncology History   Prostate cancer (HCC)   2025 Initial Diagnosis    Prostate cancer (HCC)     2025 -  Hormone Therapy    Lupron 7.5 mg IM monthly with casodex        Review of Systems Refer to nursing note.  Review of Systems   Constitutional:  Positive for fatigue. Negative for appetite change.   HENT: Negative.     Eyes: Negative.    Respiratory: Negative.     Cardiovascular: Negative.    Gastrointestinal:  Positive for constipation (taking  stool softeners).   Endocrine: Negative.    Genitourinary: Negative.    Musculoskeletal:  Positive for back pain and gait problem (started using a walker at home).   Skin: Negative.    Allergic/Immunologic: Negative.    Neurological:  Positive for weakness. Negative for dizziness, numbness and headaches.   Hematological: Negative.    Psychiatric/Behavioral: Negative.     Past Medical History   Past Medical History:   Diagnosis Date    Back pain     Prostate cancer metastatic to bone (HCC)      History reviewed. No pertinent surgical history.  Family History   Problem Relation Age of Onset    Cancer Mother     Heart attack Father 50      reports that he has quit smoking. His smoking use included cigarettes. He has been exposed to tobacco smoke. He has never used smokeless tobacco. He reports current alcohol use. He reports that he does not use drugs.  Current Outpatient Medications   Medication Instructions    acetaminophen (TYLENOL) 1,000 mg, Oral, Every 8 hours PRN    amLODIPine (NORVASC) 5 mg, Oral, 2 times daily    aspirin (ECOTRIN LOW STRENGTH) 81 mg, Daily    atorvastatin (LIPITOR) 20 mg, Oral, Daily    bicalutamide (CASODEX) 50 mg, Oral, Daily    docusate sodium (COLACE) 100 mg, Oral, 3 times daily PRN    fish oil 1,000 mg, Daily    ibuprofen (MOTRIN) 800 mg, Oral, Every 8 hours PRN    LORazepam (ATIVAN) 0.5 mg, Oral, Once, Take 30-60 min prior to MRI    oxyCODONE (ROXICODONE) 5 mg, Oral, Every 8 hours PRN    polyethylene glycol (MIRALAX) 17 g, As needed     Allergies   Allergen Reactions    Lisinopril Cough         Objective   /78 (BP Location: Left arm)   Pulse 84   Temp 97.9 °F (36.6 °C) (Temporal)   Resp 18     Pain Screenin/10  ECOG  1  Physical Exam   The patient presents today in no apparent distress.  Sclera anicteric.  Normal respiratory effort.  Normal speech.  Normal affect.  Strength in the lower extremities grossly within normal limits.    Radiology Results: I have reviewed  radiology images/reports described above.       Administrative Statements   I have spent a total time of 60 minutes in caring for this patient on the day of the visit/encounter including Diagnostic results, Prognosis, Risks and benefits of tx options, Instructions for management, Patient and family education, Importance of tx compliance, Risk factor reductions, Impressions, Counseling / Coordination of care, Documenting in the medical record, Reviewing/placing orders in the medical record (including tests, medications, and/or procedures), and Obtaining or reviewing history  .

## 2025-05-13 NOTE — ASSESSMENT & PLAN NOTE
Mr. Krishnamurthy is an 79-year-old male who recently presented to the emergency room with new onset persistent right-sided lower back pain x 1 week.  He was admitted to the hospital with workup revealing extensive osseous metastatic disease and a PSA of 686 consistent with metastatic prostate cancer.  Based on blood work, there is also some concern for potential multiple myeloma and he is scheduled for a bone marrow biopsy tomorrow.  He has already initiated androgen deprivation therapy.  He has been referred to our department for consideration of palliative spine radiation.    At this time, the patient's low back pain is mild, approximately 1 out of 10, but does become more intense with activity.  He has no neurologic symptoms at this time.  We have reviewed the available imaging including his MRI thoracic spine, FDG PET/CT, and CT of the abdomen and pelvis.  There is involvement of essentially every vertebral body.  There is a pathologic compression fracture at T11 and what appears to be mild canal stenosis at L2.  The patient today describes his pain as somewhat lower than L2 and location more in the vicinity of L4/L5.  A PSMA PET/CT is pending for 5/21/2025.  We would recommend palliative radiation therapy to include the pathologic compression fracture of T11 as well as the area of canal stenosis at L2.  The treatment field would likely encompass T10-L5.  Treatment would be delivered over the course of 2 weeks, 3000 cGy in 10 fractions.  The associated risks and toxicities of treatment were discussed with the patient in detail, including, but not limited to, fatigue, erythema, nausea, and diarrhea.  He will return for CT planning within the next week with treatment likely to begin following Memorial Day.  In the meantime he will have a bone marrow biopsy and we will follow-up on the results of his PSMA PET/CT.    Orders:    Ambulatory Referral to Radiation Oncology

## 2025-05-13 NOTE — PROGRESS NOTES
Ranulfo Krishnamurthy 1945 is a 79 y.o. male referred by Dr. Salas for metastatic prostate cancer to discuss palliative radiation to T11/back pain.    Patient with history of HTN, arthritis, PVD who presented to the ED on April 14, 2025 with right-sided lower back pain that persisted for a week. Patient reported that week prior he was out fishing and may have overly exerted himself. Patient attempted to treat back pain with Aleve with moderate improvement in pain. Patient woke up in the middle of the night with significant back pain, prompting him to go to the ED. Imaging in the ED revealed Multiple metastatic bony lesions in the thoracic, lumbar spine and left iliac bone with pathologic compression at T11. Mass extending in the canal at L2 causing canal stenosis. He was referred to Neurosurgery and hematology/oncology. PSA is elevated at 686. He consulted with Dr. Salas and is scheduled for bone marrow bx on 5/14/25 to r/o multiple myeloma and PSMA PET scan on 5/21/25 (had PET on 5/1/25 but not PSMA). He is scheduled to start Lupron on 6/4/25 and is referred to discuss palliative radiation to T11.       4/14/25 CT abdomen pelvis  1. Left common iliac and external iliac adenopathy with nonspecific mildly prominent mesenteric nodes. Differential diagnosis includes metastases from prostate cancer or other malignancy or lymphoma.   2. Multiple metastatic bony lesions in the thoracic, lumbar spine and left iliac bone with pathologic compression at T11. Mass extending in the canal at L2 causing canal stenosis.   Evaluation with MRI of the lower thoracic thoracic and lumbar spine is recommended.     4/14/25 CT recon only lumbar spine  1. 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 Evaluation with MRI of the lumbar spine with contrast is recommended. Lower thoracic spine should be included versus obtaining a  "separate MRI of the thoracic spine.  2. Left common iliac and left external iliac adenopathy which could be related to metastasis or lymphoproliferative disease.         PSA   Latest Ref Rng 0.000 - 4.000 ng/mL   4/17/2025 686.360 (H)         4/17/25 Neurosurgery  Explained x-ray imagining, --- patient refuses.  imagining ordered for today and due again in 4 weeks May  15 2025  --remarked  \"will discuss with my Dr.\"  MRI thoracic and lumbar ordered by oncologist as per patient, Family reports he will only do open air and are already scheduled form May  5th and 6th. Patient will not do closed MRI.  Explained protocol for the conservative management of compression fracture wear brace for approximately 8 -12 weeks, serial x-ray at specified intervals.   PATIENT REFUSES BRACE, REMARKED HE WILL DISCUSS WITH HIS Dr.      5/1/25 PET/CT  1. Mildly FDG avid left posterior prostate exophytic lesion, SUV max 3.3, with multiple mildly FDG avid lytic osseous lesions and FDG avid iliac chain lymphadenopathy. Findings favored to represent metastatic prostate cancer. Recommend tissue confirmation and PSMA PET/CT if clinical appropriate.   2. Left lower lobe 1.9 cm pulmonary nodule without significant FDG activity. Findings are indeterminate and may represent incidental benign lesion, non-FDG avid primary lung malignancy, post infectious/inflammatory process, or additional prostate metastatic lesion. Follow-up on PSMA PET/CT and tissue sampling as clinically appropriate.   3. FDG avid left parotid 1.8 cm mass suspicious for primary salivary gland neoplasm such as a pleomorphic adenoma or Warthin's tumor. Consider tissue sampling for further evaluation.       5/6/25 MRI thoracic spine ([facility])  Multilevel thoracic metastatic disease as well as lumbar  Mild pathologic wedge compression fracture of T11      5/7/25 Med Onc, Dr. Salas  ; testosterone 223 will start Lupron/ADT with casodex 50 mg daily for " flare.  Lupron for now 7.5 mg IM monthly   Bony lesions/external and internal left iliacs most likely metastatic prostate but will now get PSMA that was requested and not done when PSA was found to be 686   Rad Onc consult for T11/back pain   Will consider bisphosphonate but will hold for now   M spike: IgG kappa paraprotein 0.32 mg/dL-will get baseline bone marrow especially with bony disease which is more likely prostate but could be myeloma defining   Parotid left: Tail palplable on exam 1.3x1.8 cm with SUV 11 (non PSMA) this suggests this is not prostate although not ruled out; may be primary salivary gland, could be metastatic disease   Will have ENT see/biopsy   Lun.9 cm LLL nodule; prior smoker, would consider biopsy; will re-evaluate after PSMA PET  Pain-Mostly bony; using tylenol 500 mg  Palliative care consulted as taking oxycodone    Follow up 3 weeks       Upcomin25 IR bone marrow biopsy  25 PSMA PET/CT  25 Dr. Salas follow-up  25 Palliative care  25 Infusion          Oncology History   Prostate cancer (HCC)   2025 Initial Diagnosis    Prostate cancer (HCC)     2025 -  Hormone Therapy    Lupron 7.5 mg IM monthly with casodex         Review of Systems:  Review of Systems   Constitutional:  Positive for fatigue. Negative for appetite change.   HENT: Negative.     Eyes: Negative.    Respiratory: Negative.     Cardiovascular: Negative.    Gastrointestinal:  Positive for constipation (taking stool softeners).   Endocrine: Negative.    Genitourinary: Negative.    Musculoskeletal:  Positive for back pain and gait problem (started using a walker at home).   Skin: Negative.    Allergic/Immunologic: Negative.    Neurological:  Positive for weakness. Negative for dizziness, numbness and headaches.   Hematological: Negative.    Psychiatric/Behavioral: Negative.         Clinical Trial: no    Pain assessment: 1/10 mid lower back pain today, increases at times    PSA: 686.360  on 4/17/25     PFT: n/a    Prior Radiation:     Teaching: [facility] radiation packet    MST: completed     Implantable Devices (Port, pacemaker, pain stimulator): no    Hip Replacement: no    Health Maintenance   Topic Date Due    Zoster Vaccine (1 of 2) Never done    RSV Vaccine for Pregnant Patients and Patients Age 60+ Years (1 - 1-dose 75+ series) Never done    COVID-19 Vaccine (3 - Pfizer risk series) 04/05/2021    BMI: Followup Plan  03/03/2024    Influenza Vaccine (Season Ended) 09/01/2025    Fall Risk  04/03/2026    Depression Screening  04/03/2026    Medicare Annual Wellness Visit (AWV)  04/03/2026    BMI: Adult  05/07/2026    Hepatitis C Screening  Completed    Pneumococcal Vaccine: 65+ Years  Completed    Meningococcal B Vaccine  Aged Out    RSV Vaccine age 0-20 Months  Aged Out    HIB Vaccine  Aged Out    IPV Vaccine  Aged Out    Hepatitis A Vaccine  Aged Out    Meningococcal ACWY Vaccine  Aged Out    HPV Vaccine  Aged Out       Past Medical History:   Diagnosis Date    Back pain        No past surgical history on file.    Family History   Problem Relation Age of Onset    Cancer Mother     Heart attack Father 50       Social History     Tobacco Use    Smoking status: Every Day     Current packs/day: 0.25     Types: Cigarettes     Passive exposure: Past    Smokeless tobacco: Never   Vaping Use    Vaping status: Never Used   Substance Use Topics    Alcohol use: Yes    Drug use: Never          Current Outpatient Medications:     acetaminophen (TYLENOL) 500 mg tablet, Take 2 tablets (1,000 mg total) by mouth every 8 (eight) hours as needed for mild pain, Disp: 30 tablet, Rfl: 5    amLODIPine (NORVASC) 5 mg tablet, Take 1 tablet (5 mg total) by mouth 2 (two) times a day, Disp: 180 tablet, Rfl: 1    aspirin (ECOTRIN LOW STRENGTH) 81 mg EC tablet, Take 81 mg by mouth daily, Disp: , Rfl:     atorvastatin (LIPITOR) 20 mg tablet, Take 1 tablet (20 mg total) by mouth daily, Disp: 30 tablet, Rfl: 3    bicalutamide  (CASODEX) 50 mg tablet, Take 1 tablet (50 mg total) by mouth daily, Disp: 30 tablet, Rfl: 0    docusate sodium (COLACE) 100 mg capsule, Take 1 capsule (100 mg total) by mouth 3 (three) times a day as needed for constipation, Disp: 30 capsule, Rfl: 5    ibuprofen (MOTRIN) 800 mg tablet, Take 1 tablet (800 mg total) by mouth every 8 (eight) hours as needed for mild pain, Disp: 30 tablet, Rfl: 5    LORazepam (Ativan) 0.5 mg tablet, Take 1 tablet (0.5 mg total) by mouth 1 (one) time for 1 dose Take 30-60 min prior to MRI Do not start before May 6, 2025., Disp: 1 tablet, Rfl: 0    Omega-3 Fatty Acids (FISH OIL) 1,000 mg, Take 1,000 mg by mouth daily, Disp: , Rfl:     oxyCODONE (Roxicodone) 5 immediate release tablet, Take 1 tablet (5 mg total) by mouth every 8 (eight) hours as needed for severe pain Max Daily Amount: 15 mg, Disp: 90 tablet, Rfl: 0    polyethylene glycol (MiraLax) 17 g packet, Take 17 g by mouth if needed, Disp: , Rfl:     Allergies   Allergen Reactions    Lisinopril Cough        Vitals:    05/13/25 1005   BP: 140/78   BP Location: Left arm   Pulse: 84   Resp: 18   Temp: 97.9 °F (36.6 °C)   TempSrc: Temporal

## 2025-05-14 ENCOUNTER — HOSPITAL ENCOUNTER (OUTPATIENT)
Dept: INTERVENTIONAL RADIOLOGY/VASCULAR | Facility: HOSPITAL | Age: 80
Discharge: HOME | End: 2025-05-14
Attending: INTERNAL MEDICINE
Payer: COMMERCIAL

## 2025-05-14 VITALS
HEART RATE: 78 BPM | DIASTOLIC BLOOD PRESSURE: 95 MMHG | OXYGEN SATURATION: 92 % | RESPIRATION RATE: 14 BRPM | TEMPERATURE: 97.2 F | SYSTOLIC BLOOD PRESSURE: 183 MMHG

## 2025-05-14 DIAGNOSIS — M89.8X9 LYTIC BONE LESIONS ON XRAY: ICD-10-CM

## 2025-05-14 DIAGNOSIS — C79.51 METASTATIC CANCER TO BONE (HCC): ICD-10-CM

## 2025-05-14 LAB
ERYTHROCYTE [DISTWIDTH] IN BLOOD BY AUTOMATED COUNT: 13.2 % (ref 11.6–15.1)
HCT VFR BLD AUTO: 40.3 % (ref 36.5–49.3)
HGB BLD-MCNC: 13.5 G/DL (ref 12–17)
MCH RBC QN AUTO: 31.7 PG (ref 26.8–34.3)
MCHC RBC AUTO-ENTMCNC: 33.5 G/DL (ref 31.4–37.4)
MCV RBC AUTO: 95 FL (ref 82–98)
NRBC BLD AUTO-RTO: 0 /100 WBCS
PLATELET # BLD AUTO: 352 THOUSANDS/UL (ref 149–390)
PMV BLD AUTO: 8.5 FL (ref 8.9–12.7)
RBC # BLD AUTO: 4.26 MILLION/UL (ref 3.88–5.62)
WBC # BLD AUTO: 9.01 THOUSAND/UL (ref 4.31–10.16)

## 2025-05-14 PROCEDURE — 38221 DX BONE MARROW BIOPSIES: CPT

## 2025-05-14 PROCEDURE — 88237 TISSUE CULTURE BONE MARROW: CPT | Performed by: INTERNAL MEDICINE

## 2025-05-14 PROCEDURE — 38222 DX BONE MARROW BX & ASPIR: CPT

## 2025-05-14 PROCEDURE — 88311 DECALCIFY TISSUE: CPT | Performed by: PATHOLOGY

## 2025-05-14 PROCEDURE — 77002 NEEDLE LOCALIZATION BY XRAY: CPT

## 2025-05-14 PROCEDURE — 88341 IMHCHEM/IMCYTCHM EA ADD ANTB: CPT | Performed by: PATHOLOGY

## 2025-05-14 PROCEDURE — 88185 FLOWCYTOMETRY/TC ADD-ON: CPT

## 2025-05-14 PROCEDURE — 88313 SPECIAL STAINS GROUP 2: CPT | Performed by: PATHOLOGY

## 2025-05-14 PROCEDURE — 99152 MOD SED SAME PHYS/QHP 5/>YRS: CPT

## 2025-05-14 PROCEDURE — C1830 POWER BONE MARROW BX NEEDLE: HCPCS

## 2025-05-14 PROCEDURE — 88184 FLOWCYTOMETRY/ TC 1 MARKER: CPT | Performed by: INTERNAL MEDICINE

## 2025-05-14 PROCEDURE — 85097 BONE MARROW INTERPRETATION: CPT | Performed by: PATHOLOGY

## 2025-05-14 PROCEDURE — 88264 CHROMOSOME ANALYSIS 20-25: CPT | Performed by: INTERNAL MEDICINE

## 2025-05-14 PROCEDURE — 88365 INSITU HYBRIDIZATION (FISH): CPT | Performed by: PATHOLOGY

## 2025-05-14 PROCEDURE — 88374 M/PHMTRC ALYS ISHQUANT/SEMIQ: CPT | Performed by: INTERNAL MEDICINE

## 2025-05-14 PROCEDURE — 88189 FLOWCYTOMETRY/READ 16 & >: CPT

## 2025-05-14 PROCEDURE — 88305 TISSUE EXAM BY PATHOLOGIST: CPT | Performed by: PATHOLOGY

## 2025-05-14 PROCEDURE — 88364 INSITU HYBRIDIZATION (FISH): CPT | Performed by: PATHOLOGY

## 2025-05-14 PROCEDURE — 88360 TUMOR IMMUNOHISTOCHEM/MANUAL: CPT | Performed by: PATHOLOGY

## 2025-05-14 PROCEDURE — 85007 BL SMEAR W/DIFF WBC COUNT: CPT | Performed by: STUDENT IN AN ORGANIZED HEALTH CARE EDUCATION/TRAINING PROGRAM

## 2025-05-14 PROCEDURE — 88342 IMHCHEM/IMCYTCHM 1ST ANTB: CPT | Performed by: PATHOLOGY

## 2025-05-14 PROCEDURE — 99153 MOD SED SAME PHYS/QHP EA: CPT

## 2025-05-14 RX ORDER — LIDOCAINE WITH 8.4% SOD BICARB 0.9%(10ML)
SYRINGE (ML) INJECTION AS NEEDED
Status: DISCONTINUED | OUTPATIENT
Start: 2025-05-14 | End: 2025-05-15 | Stop reason: HOSPADM

## 2025-05-14 RX ORDER — FENTANYL CITRATE 50 UG/ML
INJECTION, SOLUTION INTRAMUSCULAR; INTRAVENOUS AS NEEDED
Status: DISCONTINUED | OUTPATIENT
Start: 2025-05-14 | End: 2025-05-15 | Stop reason: HOSPADM

## 2025-05-14 RX ORDER — MIDAZOLAM HYDROCHLORIDE 2 MG/2ML
INJECTION, SOLUTION INTRAMUSCULAR; INTRAVENOUS AS NEEDED
Status: DISCONTINUED | OUTPATIENT
Start: 2025-05-14 | End: 2025-05-15 | Stop reason: HOSPADM

## 2025-05-14 RX ORDER — SODIUM CHLORIDE 9 MG/ML
75 INJECTION, SOLUTION INTRAVENOUS CONTINUOUS
Status: DISCONTINUED | OUTPATIENT
Start: 2025-05-14 | End: 2025-05-15 | Stop reason: HOSPADM

## 2025-05-14 RX ADMIN — FENTANYL CITRATE 50 MCG: 50 INJECTION INTRAMUSCULAR; INTRAVENOUS at 09:46

## 2025-05-14 RX ADMIN — MIDAZOLAM 1 MG: 1 INJECTION INTRAMUSCULAR; INTRAVENOUS at 09:50

## 2025-05-14 RX ADMIN — Medication 10 ML: at 09:50

## 2025-05-14 RX ADMIN — SODIUM CHLORIDE 75 ML/HR: 0.9 INJECTION, SOLUTION INTRAVENOUS at 08:48

## 2025-05-14 RX ADMIN — FENTANYL CITRATE 50 MCG: 50 INJECTION INTRAMUSCULAR; INTRAVENOUS at 09:50

## 2025-05-14 RX ADMIN — MIDAZOLAM 1 MG: 1 INJECTION INTRAMUSCULAR; INTRAVENOUS at 09:46

## 2025-05-14 NOTE — BRIEF OP NOTE (RAD/CATH)
INTERVENTIONAL RADIOLOGY PROCEDURE NOTE    Date: 5/14/2025    Procedure:   Procedure Summary       Date: 05/14/25 Room / Location: [facility] Interventional Radiology    Anesthesia Start:  Anesthesia Stop:     Procedure: IR BIOPSY BONE MARROW Diagnosis:       Metastatic cancer to bone (HCC)      Lytic bone lesions on xray      (IgG kappa paraprotein 0.32 mg/dL. prostate vs pre-myeloma)    Scheduled Providers:  Responsible Provider:     Anesthesia Type: Not recorded ASA Status: Not recorded            Preoperative diagnosis:   1. Metastatic cancer to bone (HCC)    2. Lytic bone lesions on xray         Postoperative diagnosis: Same.    Surgeon: Leo Marrero MD     Assistant: None. No qualified resident was available.    Blood loss: 5 ml    Specimens: sent to path     Findings:   BMB/asp L iliac crest.    Complications: None immediate.    Anesthesia: conscious sedation

## 2025-05-14 NOTE — DISCHARGE INSTRUCTIONS
Bone Marrow Biopsy     WHAT YOU NEED TO KNOW:   A bone marrow biopsy is a procedure to remove a small amount of bone marrow from your bone. Bone marrow is the soft tissue inside your bone that helps to make blood cells. The sample is tested for disease or infection.    DISCHARGE INSTRUCTIONS:     1. Limit your activities day of biopsy as directed by your doctor.    2. Use medication as ordered.    3. Return to your normal diet.Small sips of flat soda will help with nausea.    4. Remove band-aid or dressing 24 hours after procedure.    Contact [facility]iology at [phone] if:    1. Difficulty breathing, nausea or vomiting.    2. Chills or fever above 101 F.    3. Pain at biopsy site not relieved by medication    4. Develop any redness, swelling, heat, unusual drainage, heavy bruising or bleeding from biopsy site.    Your skin is itchy, swollen, or you have a rash.     You have nausea or are vomiting for more than 8 hours after the procedure.      You have questions or concerns about your condition or care.   Procedural Sedation   WHAT YOU NEED TO KNOW:   Procedural sedation is medicine used during procedures to help you feel relaxed and calm. You will remember little to none of the procedure. After sedation you may feel tired, weak, or unsteady on your feet. You may also have trouble concentrating or short-term memory loss. These symptoms should go away in 24 hours or less.   DISCHARGE INSTRUCTIONS:   Call 911 or have someone else call for any of the following:   You have sudden trouble breathing.     You cannot be woken.     Contact [facility]iology at [phone]   [place] PATIENTS: Contact [facility] at [phone] [place] PATIENTS: Contact [facility]iology at [phone]) if any of the following occur:      You have a severe headache or dizziness.     Your heart is beating faster than usual.    You have a fever or chills.     Your skin is itchy, swollen, or you have a  rash.     You have nausea or are vomiting for more than 8 hours after the procedure.      You have questions or concerns about your condition or care.  Self-care:   Have someone stay with you for 24 hours. This person can drive you to errands and help you do things around the house. This person can also watch for problems.      Rest and do quiet activities for 24 hours. Do not exercise, ride a bike, or play sports. Stand up slowly to prevent dizziness and falls. Take short walks around the house with another person. Slowly return to your usual activities the next day.      Do not drive or use dangerous machines or tools for 24 hours. You may injure yourself or others. Examples include a lawnmower, saw, or drill. Do not return to work for 24 hours if you use dangerous machines or tools for work.      Do not make important decisions for 24 hours. For example, do not sign important papers or invest money.      Drink liquids as directed. Liquids help flush the sedation medicine out of your body. Ask how much liquid to drink each day and which liquids are best for you.      Eat small, frequent meals to prevent nausea and vomiting. Start with clear liquids such as juice or broth. If you do not vomit after clear liquids, you can eat your usual foods.      Do not drink alcohol or take medicines that make you drowsy. This includes medicines that help you sleep and anxiety medicines. Ask your healthcare provider if it is safe for you to take pain medicine.  Follow up with your healthcare provider as directed: Write down your questions so you remember to ask them during your visits.

## 2025-05-16 LAB — SCAN RESULT: NORMAL

## 2025-05-16 PROCEDURE — 85060 BLOOD SMEAR INTERPRETATION: CPT | Performed by: PATHOLOGY

## 2025-05-19 LAB
BASOPHILS # BLD AUTO: 0.09 THOUSAND/UL (ref 0–0.1)
BASOPHILS NFR MAR MANUAL: 1 % (ref 0–1)
EOSINOPHIL # BLD AUTO: 0.27 THOUSAND/UL (ref 0–0.61)
EOSINOPHIL NFR BLD MANUAL: 3 % (ref 0–6)
LYMPHOCYTES # BLD AUTO: 1.8 THOUSAND/UL (ref 0.6–4.47)
LYMPHOCYTES # BLD AUTO: 20 %
MONOCYTES # BLD AUTO: 0.54 THOUSAND/UL (ref 0–1.22)
MONOCYTES NFR BLD AUTO: 6 % (ref 4–12)
NEUTS SEG # BLD: 6.31 THOUSAND/UL (ref 1.81–6.82)
NEUTS SEG NFR BLD AUTO: 70 %
PATHOLOGY REVIEW: YES
RBC MORPH BLD: NORMAL
TOTAL CELLS COUNTED SPEC: 100

## 2025-05-20 ENCOUNTER — APPOINTMENT (OUTPATIENT)
Dept: RADIATION ONCOLOGY | Facility: HOSPITAL | Age: 80
End: 2025-05-20
Attending: RADIOLOGY
Payer: COMMERCIAL

## 2025-05-20 PROCEDURE — 77290 THER RAD SIMULAJ FIELD CPLX: CPT | Performed by: STUDENT IN AN ORGANIZED HEALTH CARE EDUCATION/TRAINING PROGRAM

## 2025-05-21 ENCOUNTER — HOSPITAL ENCOUNTER (OUTPATIENT)
Dept: RADIOLOGY | Age: 80
Discharge: HOME | End: 2025-05-21
Attending: INTERNAL MEDICINE

## 2025-05-22 LAB — MISCELLANEOUS LAB TEST RESULT: NORMAL

## 2025-05-26 NOTE — ASSESSMENT & PLAN NOTE
Orders:    CBC and differential; Future    Comprehensive metabolic panel; Future    LD,Blood; Future    Magnesium; Future    PSA Total, Diagnostic; Future    Testosterone; Future    bicalutamide (CASODEX) 50 mg tablet; Take 1 tablet (50 mg total) by mouth daily

## 2025-05-26 NOTE — PROGRESS NOTES
Name: Ranulfo Krishnamurthy      : 1945      MRN: [id number]  Encounter Provider: Chyna Salas MD  Encounter Date: 2025   Encounter department: [facility]ATOLOG[facility]  :  Assessment & Plan  MGUS (monoclonal gammopathy of unknown significance)     M spike     IgG kappa paraprotein 0.32 mg/dL-will get baseline bone marrow especially with bony disease which is more likely prostate but could be myeloma defining      2025    Bone marrow biopsy 2025 c/w MGUS  A -C. Bone marrow, left iliac crest, biopsy and aspirate:  - Mildly kappa skewed plasma cell population without significant quantitative increase (less than 5% overall), cannot exclude reactive versus low level monoclonal gammopathy of undetermined significance (MGUS) (see note).  - Maturing trilineage hematopoiesis with increased cellularity without features of atypia/dysplasia or increased blasts, favor reactive.  - Adequate stainable storage iron.  - Normal reticulin fibers without fibrosis.  - Negative for collagen fibrosis, granulomata, vasculitis, necrosis.    Prostate cancer (HCC)  Orders:    CBC and differential; Future    Comprehensive metabolic panel; Future    LD,Blood; Future    Magnesium; Future    PSA Total, Diagnostic; Future    Testosterone; Future    bicalutamide (CASODEX) 50 mg tablet; Take 1 tablet (50 mg total) by mouth daily    Pt is a 79 y.o. male with PMHX HTN, arthritis, PVD who presented to the ED on 2025. Patient presents with right-sided lower back pain that has persisted for the past week.  Patient states that week prior he was out fishing and may have overly exerted himself.       Found to have lytic bone lesions on Xray-no workup done at time and referred to Heme Onc     Seen in ER for back pain 2025       Labs 2025      Na 141 K 3.9 Cr 0.97 Ca 9.2 ALT/AST 13/16 lipase 12 WBC 11.1 Hgb 14.1  MCV 94 plts 384 ANC 8010         CT AP 2024 with the  following       ABDOMINOPELVIC CAVITY: No ascites. No pneumoperitoneum.   Enlarged left common iliac node measuring 2.3 x 1.7 cm series 302 image 109   5.3 x 3.8 x 6.4 cm left external iliac node series 302   image 131.   Multiple slightly prominent mesenteric nodes are seen, the largest with a short axis of 12 mm series 302 image 56.      VESSELS: Atherosclerosis without abdominal aortic aneurysm.   Ectasia of the infrarenal abdominal aorta measuring 2.8 cm.      PELVIS      REPRODUCTIVE ORGANS: Enlarged prostate. Bulging of the left posterior mid gland.   Right hydrocele.         URINARY BLADDER: Unremarkable.      ABDOMINAL WALL/INGUINAL REGIONS: Unremarkable.      BONES: Multiple lytic bony lesions at T10, T11, L1 and L2 with paravertebral soft tissue swelling at T11.   Pathologic compression fracture is seen at T11. There is lytic bony lesion with mass extending in the canal at L2 causing canal stenosis.   Large lytic bony lesion is seen in the left iliac bone 302/116.      IMPRESSION:         1. Left common iliac and external iliac adenopathy with nonspecific mildly prominent mesenteric nodes. Differential diagnosis includes metastases from prostate cancer or other malignancy or lymphoma.   2. Multiple metastatic bony lesions in the thoracic, lumbar spine and left iliac bone with pathologic compression at T11. Mass extending in the canal at L2 causing canal stenosis.   Evaluation with MRI of the lower thoracic thoracic and lumbar spine is recommended.      5/7/2025          Was supposed to have PSMA PET based on markedly elevated PSA but wasn't done despite two requests to change from FDG to PSMA     Will need to repeat PSMA as multiple areas of issues on PET as below     PET was as below 5/1/2025    1. Mildly FDG avid left posterior prostate exophytic lesion, SUV max 3.3, with multiple mildly FDG avid lytic osseous lesions and FDG avid iliac chain lymphadenopathy. Findings favored to represent  metastatic prostate cancer. Recommend tissue   confirmation and PSMA PET/CT if clinical appropriate.     2. Left lower lobe 1.9 cm pulmonary nodule without significant FDG activity. Findings are indeterminate and may represent incidental benign lesion, non-FDG avid primary lung malignancy, post infectious/inflammatory process, or additional prostate   metastatic lesion. Follow-up on PSMA PET/CT and tissue sampling as clinically appropriate.     3. FDG avid left parotid 1.8 cm mass suspicious for primary salivary gland neoplasm such as a pleomorphic adenoma or Warthin's tumor. Consider tissue sampling for further evaluation.     Thus there are several areas of concern     Left parotid 1.3x1.8 SUV max 11-will send to ENT/possible biopsy     Chest-1.9x1.6x1.6 LLL nodule SUV 0.9-again, PSMA may have been positive but this is a sizable nodule that may need biopsy   L common iliac 2.2x1.8 cm SUV 2.7     Left external iliac 5.4x3.8.6.3 cm SUV 4.4     Prostatomegaly c/w      Multiple lytic lesions T11,T10, L2      MRI T spine done outside, was unable to do L spine as could lay flat     Involvement demonstrated in all vertebral bodies with compression T11-no stenosis of the spinal canal.  T10 with pedicle involvement as well as T11.  L1/L2 pedicle involved Mild pathologic wedge compression fracture T11     In addition to above, lab work done with      M protein  IgG kappa M spike 0.32 g/dL Na 138 K 4.0 Cr 1.0 Ca 9.4 Mg 2.0 ALT/AST 20/19 TB 0.41  B2M 3.2  Testosterone 223 WBC 8/8 Hgb 14.1 MCV 99 plts 436      Continues with significant back pain     5/28/2025    Was seen by Rad Onc-started RT for 10 days today 5/28/2025    Feels improved    Tolerating lupron/casodex -will continue on this for now    PSMA PET to be scheduled in late June-until have PET, will hold on changing casodex to AR blocker such as enzalutamide (160 mg daily) , daralutamide (600 mg bid)   or apalutamide (240 mg daily)    Pain better,  will refer to Palliative          Summary/Recommendations      Prostate:      ; testosterone 223 will start Lupron/ADT with casodex 50 mg daily for flare.  Lupron for now 7.5 mg IM monthly started will remain on casodex for now      Bony lesions/external and internal left iliacs most likely metastatic prostate but will now get PSMA that was requested and not done when PSA was found to be 686     Rad Onc consult for T11/back pain-started 10 fractions today     Will consider bisphosphonate but will hold for now       Parotid left     Tail palplable on exam 1.3x1.8 cm with SUV 11 (non PSMA) this suggests this is not prostate although not ruled out; may be primary salivary gland, could be metastatic disease     Will have ENT see/biopsy     Lung     1.9 cm LLL nodule; prior smoker, would consider biopsy; will re-evaluate after PSMA PET     Pain-     Mostly bony; using tylenol 500 mg-cautioned on dose ceiling 3 grams     Palliative care consulted as taking oxycodone      Discussed bowel regimen      Follow up      Late June  at [facility]       History of Present Illness   No chief complaint on file.    Pt is a 79 y.o. male who presents to the ED on April 14, 2025. Patient presents with right-sided lower back pain that has persisted for the past week.  Patient states that week prior he was out fishing and may have overly exerted himself.  Patient attempted to treat back pain with Aleve OTC with moderate improvement in pain.  However, earlier this morning at approximately 1:30 AM, patient woke up with significant back pain, prompting him to come to the ED.  Patient states that he still been able to walk independently, however states that walking is limited due to pain.  Patient also recently recovered from what he describes as a stomach bug that he and his son who was at bedside was also recovering from.  Patient states that pain is located to the right side, no radiation to the posterior lateral right leg. Patient  otherwise denies any fever, chills, chest pain, current nausea, current vomiting, current diarrhea, urinary distention, urinary incontinence, fecal incontinence, abdominal pain, radiating pain, ambulatory dysfunction, neurologic dysfunction, saddle anesthesia.  Patient also denies any IV drug use, illicit drug use. Still currently smoking.  ROS otherwise negative.  No other concerns at this time.     This patient presents with back pain most consistent with compression fractures of the thoracic vertebrae concerning for spinal mets of malignancy with unknown primary. No back pain red flags on history or physical. Presentation not consistent with cauda equina (no bowel or urinary incontinence/retention, no saddle anesthesia, no distal weakness), AAA, viscus perforation, osteomyelitis or epidural abscess (no IVDU, vertebral tenderness), renal colic, pyelonephritis (afebrile, no CVAT, no urinary symptoms).  Due to imaging findings, patient was provided with ambulatory referral to hematology oncology and neurosurgery.  Patient advised to continue pain management with OTC ibuprofen and Tylenol, patient provided course of oxycodone for breakthrough pain.  Discussed management and discharge plan with patient at bedside, who was in agreement, all questions answered, patient discharged home.           CT  AP 4/14/20225       LOWER CHEST: Subsegmental atelectasis in the left lower lobe.      LIVER/BILIARY TREE: Unremarkable.      GALLBLADDER: No calcified gallstones. No pericholecystic inflammatory change.      SPLEEN: Unremarkable.      PANCREAS: Unremarkable.      ADRENAL GLANDS: Low density lobulated thickening of bilateral adrenal glands, statistically likely due to small adenomas or adenomatous hyperplasia.      KIDNEYS/URETERS: No hydronephrosis. Renal vascular calcifications. Subcentimeter hypoattenuating renal lesion(s), too small to characterize but statistically likely benign, which do not warrant follow-up  (Radiology June 2019).      STOMACH AND BOWEL: Stomach is unremarkable.   Small bowel loops show normal caliber.   No inflammatory changes or bowel obstruction.         APPENDIX: Normal.      ABDOMINOPELVIC CAVITY: No ascites. No pneumoperitoneum.   Enlarged left common iliac node measuring 2.3 x 1.7 cm series 302 image 109   5.3 x 3.8 x 6.4 cm left external iliac node series 302   image 131.   Multiple slightly prominent mesenteric nodes are seen, the largest with a short axis of 12 mm series 302 image 56.      VESSELS: Atherosclerosis without abdominal aortic aneurysm.   Ectasia of the infrarenal abdominal aorta measuring 2.8 cm.      PELVIS      REPRODUCTIVE ORGANS: Enlarged prostate. Bulging of the left posterior mid gland.   Right hydrocele.         URINARY BLADDER: Unremarkable.      ABDOMINAL WALL/INGUINAL REGIONS: Unremarkable.      BONES: Multiple lytic bony lesions at T10, T11, L1 and L2 with paravertebral soft tissue swelling at T11.   Pathologic compression fracture is seen at T11. There is lytic bony lesion with mass extending in the canal at L2 causing canal stenosis.   Large lytic bony lesion is seen in the left iliac bone 302/116.      IMPRESSION:         1. Left common iliac and external iliac adenopathy with nonspecific mildly prominent mesenteric nodes. Differential diagnosis includes metastases from prostate cancer or other malignancy or lymphoma.   2. Mul   tiple metastatic bony lesions in the thoracic, lumbar spine and left iliac bone with pathologic compression at T11. Mass extending in the canal at L2 causing canal stenosis.   Evaluation with MRI of the lower thoracic thoracic and lumbar spine is recommended.         Patient states has had back pain intermittently for several months.  Does not remember when had PSA done last/exam.  No weight loss, 215 pounds.  Pain across both sides lower back as well as left flank/pelvis.  No urinary issues, no neurological issues     Has been smoking since  teens; no recent lung evaluation -scan in ER was just AP     5/7/2025     CT PET  5/1/2025       FINDINGS:     VISUALIZED BRAIN:  No acute abnormalities are seen.     HEAD/NECK:  Lack of synchronization between PET and CT images within the head/neck secondary to motion, somewhat limiting evaluation.     FDG avid left parotid mass measuring 1.3 x 1.8 cm, SUV max 11 (series 1200, image 57).     No additional FDG avid lymph nodes/lesions.     CT images: No additional significant findings.     CHEST:  Left lower lobe peripheral nodule measuring 1.9 x 1.6 x 1.6 cm, with FDG activity similar to background soft tissue, SUV max 0.9. This nodule was excluded from the field-of-view on prior exam.     Additional left lower lobe 0.5 cm nodule (series 3, image 141), without significant FDG activity, however this is below the size threshold for FDG evaluation. This nodule was obscured by atelectasis on prior exam.     CT images: Mild emphysematous changes. Patent central airways. Moderate coronary artery calcifications.     ABDOMEN:  Mildly FDG avid left common iliac lymph node measuring 2.2 x 1.8 cm, SUV max 2.7, unchanged in size from prior.     Redemonstrated borderline enlarged mesenteric lymph nodes without significant FDG activity, for example a left mid abdominal mesenteric lymph node measuring 1.2 cm short axis (series 3, image 204). These lymph nodes are grossly unchanged from prior.     CT images: Low-density lobulated thickening of bilateral adrenal glands, likely adenomatous hyperplasia. Small fat-containing umbilical hernia.     PELVIS:  Large FDG avid left external iliac lymph node measuring 5.4 x 3.8 x 6.3 cm, SUV max 4.4, unchanged in size from prior.     Redemonstrated prostatomegaly with left posterior mid gland bulging/exophytic lesion measuring approximately 2.1 x 2.2 cm, SUV max 3.3. Low-level FDG activity throughout the remainder of the prostate, particularly the inferior prostate, SUV max 2.2.     CT  images: Small to moderate right hydrocele.     OSSEOUS STRUCTURES:  Redemonstrated multiple lytic bony lesions demonstrating mild FDG activity with representative examples as below.  - Left iliac bone lytic lesion with minimal FDG activity, SUV max 1.9.  - Focus of mild right posterior iliac bone FDG activity with associated cortical thinning, SUV max 2.3 (series 1200, image 229).  - L2 vertebral body lytic lesion extending into the left pedicle, SUV max 3.3 (series 1200, image 190).  -T11 vertebral body lytic lesions with associated pathologic compression fracture, SUV max 3.5.  - T10 vertebral body lytic lesion extending into the right pedicle and posterior elements, SUV max 2.7 (series 1200, image 152).     CT images: Spinal degenerative changes.     IMPRESSION:     1. Mildly FDG avid left posterior prostate exophytic lesion, SUV max 3.3, with multiple mildly FDG avid lytic osseous lesions and FDG avid iliac chain lymphadenopathy. Findings favored to represent metastatic prostate cancer. Recommend tissue   confirmation and PSMA PET/CT if clinical appropriate.     2. Left lower lobe 1.9 cm pulmonary nodule without significant FDG activity. Findings are indeterminate and may represent incidental benign lesion, non-FDG avid primary lung malignancy, post infectious/inflammatory process, or additional prostate   metastatic lesion. Follow-up on PSMA PET/CT and tissue sampling as clinically appropriate.     3. FDG avid left parotid 1.8 cm mass suspicious for primary salivary gland neoplasm such as a pleomorphic adenoma or Warthin's tumor. Consider tissue sampling for further evaluation.      5/28/2025    Bone marrow 5/14/2025    A -C. Bone marrow, left iliac crest, biopsy and aspirate:  - Mildly kappa skewed plasma cell population without significant quantitative increase (less than 5% overall), cannot exclude reactive versus low level monoclonal gammopathy of undetermined significance (MGUS) (see note).  - Maturing  trilineage hematopoiesis with increased cellularity without features of atypia/dysplasia or increased blasts, favor reactive.  - Adequate stainable storage iron.  - Normal reticulin fibers without fibrosis.  - Negative for collagen fibrosis, granulomata, vasculitis, necrosis.   Electronically signed by Leo Fernandez MD on 5/21/2025 at 1101 EDT   Microscopic Description    Bone marrow aspirate smears: The aspirate smears are cellular and adequate for evaluation with limited particles/spicules. Cellularity is composed of maturing trilineage hematopoiesis with a myeloid to erythroid ratio of 3-4:1.  Myelopoiesis: Myeloid lineage cells show orderly maturation and normal morphology without significant features of atypia/dysplasia or increased blasts (myeloblasts= less than 5%).  Erythropoiesis: Erythroid precursors show orderly maturation and normal morphology without significant features of atypia/dysplasia  Megakaryocytes: Scattered megakaryocytes are present with normal morphology  Lymphocytes: Lymphocytes are not increased  Plasma cells: Plasma cells are not increased     Peripheral blood smear review shows white blood cells with normal morphology and distribution without significant features of atypia/dysplasia or circulating blasts.  Red blood cells show mild nonspecific anisopoikilocytosis without distinct diagnostic morphologic abnormality.  Platelets show normal quantity and morphology without significant satellitosis or clumping.     Bone marrow core biopsy and clot sections are adequate for evaluation.  The marrow cellularity is increased for patient age (approximately 50% overall with patchy distribution).  Myelopoiesis: Myeloid lineage cells show normal distribution and orderly maturation without distinct increase in myeloblasts by H&E stain  Erythropoiesis: Erythroid lineage cells show orderly maturation and distribution  Megakaryocytes: Megakaryocytes show normal-appearing quantity and distribution with  normal morphology and no significant clusters  Lymphocytes: Lymphocytes are not significantly increased  Plasma cells: Plasma cells are not significantly increased     PAS stain performed on the core biopsy shows mixed maturing trilineage hematopoiesis with orderly appearing distribution and maturation  Iron stain performed on the core biopsy, clot, and aspirate shows adequate stainable storage iron and no ring sideroblasts.  Reticulin stain performed on the core biopsy shows normal reticulin fibers (grade 0 of 3).  Immunohistochemical stains performed with appropriate controls show:  CD34 highlights occasional scattered myeloblasts without significant quantitative increase (less than 5% overall)   highlights scattered immature myeloid and erythroid precursors  CD61 highlights scattered megakaryocytes with normal-appearing morphology and distribution  CD3 highlights scattered small T cells without distinct abnormal increase or abnormal aggregates   CD20 highlights scattered small B cells without significant abnormal increase   highlights scattered plasma cells without significant quantitative increase (less than 5% overall)  In situ hybridization analysis shows the plasma cells to be slightly kappa skewed with a kappa to lambda ratio of approximately 3-4:1  IgLL5 in situ hybridization is negative  Keratin AE 1/3 is negative for metastatic carcinoma   Note    CBC  Component  Ref Range & Units (hover) 5/14/25 0844 4/17/25 0737 4/14/25 0435 3/26/24 0842 9/28/23 0812   WBC 9.01 8.78 11.07 High  8.96 8.09   RBC 4.26 4.57 4.42 4.77 4.70   Hemoglobin 13.5 14.1 14.1 15.0 14.5   Hematocrit 40.3 45.1 41.4 45.0 44.1   MCV 95 99 High  94 94 94   MCH 31.7 30.9 31.9 31.4 30.9   MCHC 33.5 31.3 Low  34.1 33.3 32.9   RDW 13.2 13.7 13.5 13.0 12.9   MPV 8.5 Low  9.1 8.7 Low  9.8 9.6   Platelets 352 436 High  384 317 337              Genetic analysis including plasma cell FISH cytogenetics are pending and will be reported as  an addendum.     Overall, the combination of findings is not definitively specific, though there is a very low level plasma cell population with kappa predominance without overt restriction.  The patient's history of an IgG kappa monoclonal paraprotein is noted and these findings may be compatible with a low level kappa plasma cell monoclonal gammopathy of undetermined significance (MGUS).  Alternatively, the possibility of reactive changes with a pseudo clonal paraprotein cannot be entirely excluded.  The differential diagnosis may include infection, drug effect, toxin exposure, autoimmune disease, or other reactive inflammatory condition.     Additionally, it is noted that the patient presents with lytic bone lesions identified by imaging studies.  If clinically indicated to further evaluate this process and classifying the possibility of a plasma cell dyscrasia versus metastatic carcinoma and directed sampling of an accessible lesion may be of assistance.     Correlation with clinical impression and other laboratory findings with consideration for additional tissue sampling as clinically indicated is recommended.   Additional Information    All reported additional testing was performed with appropriately reactive controls.  These tests were developed and their performance characteristics determined by [facility] or appropriate performing facility, though some tests may be performed on tissues which have not been validated for performance characteristics (such as staining performed on alcohol exposed cell blocks and decalcified tissues).  Results should be interpreted with caution and in the context of the patients’ clinical condition. These tests may not be cleared or approved by the U.S. Food and Drug Administration, though the FDA has determined that such clearance or approval is not necessary. These tests are used for clinical purposes and they should not be regarded as investigational or  "for research. This laboratory has been approved by CLIA 88, designated as a high-complexity laboratory and is qualified to perform these tests.  Interpretation performed at [facility], [address] [facility] [address]   Gross Description    A. The specimen is received in formalin, labeled with the patient's name and hospital number, and is designated \" left iliac crest core\".  The specimen consists of multiple pink osseous tissue fragments measuring in aggregate of 1.3 x 1.0 x 0.3 cm.  The specimen is drained into an embedding bag.  Entirely submitted. One cassette. The specimen is placed into Immunocal after proper fixation time.    B. The specimen is received in formalin, labeled with the patient's name and hospital number, and is designated \" left iliac crest clot\".  The specimen consists of multiple red-brown hemorrhagic and clotted tissue fragments measuring in aggregate of 2.4 x 1.7 x 0.2 cm.  The specimen is drained into an embedding bag. Entirely submitted. One cassette.  C. The specimen is received, labeled with the patient's name and hospital number, and is designated \"Left iliac crest slides\". The specimen consists of microscopic slides submitted for histological review. No cassettes submitted.     Note: The estimated total formalin fixation time based upon information provided by the submitting clinician and the standard processing schedule is under 72 hours. Pepper Mendoza        Oncology History   Cancer Staging   Prostate cancer (HCC)  Staging form: Prostate, AJCC 8th Edition  - Clinical: Stage IVB (cTX, cN0, pM1b, PSA: 680) - Signed by Jayden Oswald MD on 5/13/2025  Prostate specific antigen (PSA) range: 20 or greater  Oncology History   Prostate cancer (HCC)   5/7/2025 Initial Diagnosis    Prostate cancer ([facility])     5/7/2025 -  Hormone Therapy    Lupron 7.5 mg IM monthly with casodex     5/13/2025 -  Cancer Staged    Staging form: Prostate, AJCC 8th Edition  - Clinical: Stage IVB (cTX, cN0, " pM1b, PSA: 680) - Signed by Jayden Oswald MD on 5/13/2025  Prostate specific antigen (PSA) range: 20 or greater          Pertinent Medical History      04/15/25:     05/04/25:     05/26/25:      Review of Systems   Constitutional:  Positive for fatigue.   Musculoskeletal:  Positive for back pain.   All other systems reviewed and are negative.          Objective   There were no vitals taken for this visit.    Pain Screening:     ECOG   1  Physical Exam no exam telemedicine     Labs: I have reviewed the following labs:  Lab Results   Component Value Date/Time    WBC 9.01 05/14/2025 08:44 AM    RBC 4.26 05/14/2025 08:44 AM    Hemoglobin 13.5 05/14/2025 08:44 AM    Hematocrit 40.3 05/14/2025 08:44 AM    MCV 95 05/14/2025 08:44 AM    MCH 31.7 05/14/2025 08:44 AM    RDW 13.2 05/14/2025 08:44 AM    Platelets 352 05/14/2025 08:44 AM    Segmented % 65 04/17/2025 07:37 AM    Lymphocytes % 20 05/14/2025 08:44 AM    Lymphocytes % 19 04/17/2025 07:37 AM    Monocytes % 6 05/14/2025 08:44 AM    Monocytes % 9 04/17/2025 07:37 AM    Eosinophils % 3 05/14/2025 08:44 AM    Eosinophils Relative 5 04/17/2025 07:37 AM    Basophils % 1 05/14/2025 08:44 AM    Basophils Relative 1 04/17/2025 07:37 AM    Immature Grans % 1 04/17/2025 07:37 AM    Absolute Neutrophils 6.31 05/14/2025 08:44 AM    Absolute Neutrophils 5.77 04/17/2025 07:37 AM     Lab Results   Component Value Date/Time    Potassium 4.0 04/17/2025 07:37 AM    Chloride 103 04/17/2025 07:37 AM    CO2 24 04/17/2025 07:37 AM    BUN 17 04/17/2025 07:37 AM    Creatinine 1.00 04/17/2025 07:37 AM    Glucose, Fasting 92 04/17/2025 07:37 AM    Calcium 9.4 04/17/2025 07:37 AM    AST 19 04/17/2025 07:37 AM    ALT 20 04/17/2025 07:37 AM    Alkaline Phosphatase 139 (H) 04/17/2025 07:37 AM    Total Protein 7.5 04/17/2025 07:37 AM    Total Protein 7.1 04/17/2025 07:37 AM    Albumin 4.1 04/17/2025 07:37 AM    Total Bilirubin 0.41 04/17/2025 07:37 AM    eGFR 70 04/17/2025 07:37 AM            Administrative Statements   Encounter provider Chyna Salas MD    The Patient is located at Home and in the following state in which I hold an active license [place].    The patient was identified by name and date of birth. Ranulfo BAILEY Raghu was informed that this is a telemedicine visit and that the visit is being conducted through the Epic Embedded platform. He agrees to proceed..  My office door was closed. No one else was in the room.  He acknowledged consent and understanding of privacy and security of the video platform. The patient has agreed to participate and understands they can discontinue the visit at any time.    I have spent a total time of 30  minutes in caring for this patient on the day of the visit/encounter including Impressions, Counseling / Coordination of care, Documenting in the medical record, Reviewing/placing orders in the medical record (including tests, medications, and/or procedures), and Obtaining or reviewing history  , not including the time spent for establishing the audio/video connection.

## 2025-05-27 ENCOUNTER — TELEPHONE (OUTPATIENT)
Age: 80
End: 2025-05-27

## 2025-05-27 PROCEDURE — 77334 RADIATION TREATMENT AID(S): CPT | Performed by: RADIOLOGY

## 2025-05-27 PROCEDURE — 77300 RADIATION THERAPY DOSE PLAN: CPT | Performed by: RADIOLOGY

## 2025-05-27 PROCEDURE — 77295 3-D RADIOTHERAPY PLAN: CPT | Performed by: RADIOLOGY

## 2025-05-27 NOTE — TELEPHONE ENCOUNTER
Called and spoke to patient's daughter, Carmen. Okay to keep appointment tomorrow to go over bone marrow biopsy results. Will switch appointment to a virtual since Ranulfo will be at [facility] an hour prior for radiation and may not be feeling well or able to get to [facility] in time. Carmen appreciated the call.

## 2025-05-27 NOTE — TELEPHONE ENCOUNTER
Patients daughter, Carmen calling to speak with Dr. Salas.  Patient is scheduled for appt 5.28.25 @ 11:20am.  He didn't have the petscan due to radiation treatment.  She wants to know if he should keep appt.  She also said patient doesn't want to have pet scan until he finishes radiation. Please call her [phone].

## 2025-05-28 ENCOUNTER — APPOINTMENT (OUTPATIENT)
Dept: RADIATION ONCOLOGY | Facility: HOSPITAL | Age: 80
End: 2025-05-28
Attending: RADIOLOGY
Payer: COMMERCIAL

## 2025-05-28 ENCOUNTER — TELEMEDICINE (OUTPATIENT)
Age: 80
End: 2025-05-28
Payer: COMMERCIAL

## 2025-05-28 DIAGNOSIS — C61 PROSTATE CANCER (HCC): ICD-10-CM

## 2025-05-28 DIAGNOSIS — D47.2 MGUS (MONOCLONAL GAMMOPATHY OF UNKNOWN SIGNIFICANCE): Primary | ICD-10-CM

## 2025-05-28 LAB
RAD ONC ARIA COURSE FIRST TREATMENT DATE: NORMAL
RAD ONC ARIA COURSE ID: NORMAL
RAD ONC ARIA COURSE INTENT: NORMAL
RAD ONC ARIA COURSE LAST TREATMENT DATE: NORMAL
RAD ONC ARIA COURSE SESSION NUMBER: 1
RAD ONC ARIA COURSE START DATE: NORMAL
RAD ONC ARIA COURSE TREATMENT ELAPSED DAYS: 0
RAD ONC ARIA PLAN FRACTIONS TREATED TO DATE: 1
RAD ONC ARIA PLAN ID: NORMAL
RAD ONC ARIA PLAN NAME: NORMAL
RAD ONC ARIA PLAN PRESCRIBED DOSE PER FRACTION: 3 GY
RAD ONC ARIA PLAN PRIMARY REFERENCE POINT: NORMAL
RAD ONC ARIA PLAN TOTAL FRACTIONS PRESCRIBED: 10
RAD ONC ARIA PLAN TOTAL PRESCRIBED DOSE: 3000 CGY
RAD ONC ARIA REFERENCE POINT DOSAGE GIVEN TO DATE: 3 GY
RAD ONC ARIA REFERENCE POINT ID: NORMAL
RAD ONC ARIA REFERENCE POINT SESSION DOSAGE GIVEN: 3 GY
SCAN RESULT: NORMAL

## 2025-05-28 PROCEDURE — 77280 THER RAD SIMULAJ FIELD SMPL: CPT | Performed by: RADIOLOGY

## 2025-05-28 PROCEDURE — 77387 GUIDANCE FOR RADJ TX DLVR: CPT | Performed by: RADIOLOGY

## 2025-05-28 PROCEDURE — 77412 RADIATION TX DELIVERY LVL 3: CPT | Performed by: RADIOLOGY

## 2025-05-28 PROCEDURE — 99214 OFFICE O/P EST MOD 30 MIN: CPT | Performed by: INTERNAL MEDICINE

## 2025-05-28 PROCEDURE — 77427 RADIATION TX MANAGEMENT X5: CPT | Performed by: RADIOLOGY

## 2025-05-28 RX ORDER — BICALUTAMIDE 50 MG/1
50 TABLET, FILM COATED ORAL DAILY
Qty: 30 TABLET | Refills: 1 | Status: SHIPPED | OUTPATIENT
Start: 2025-05-28

## 2025-05-29 ENCOUNTER — CONSULT (OUTPATIENT)
Dept: PALLIATIVE MEDICINE | Facility: CLINIC | Age: 80
End: 2025-05-29
Attending: INTERNAL MEDICINE
Payer: COMMERCIAL

## 2025-05-29 ENCOUNTER — APPOINTMENT (OUTPATIENT)
Dept: RADIATION ONCOLOGY | Facility: HOSPITAL | Age: 80
End: 2025-05-29
Attending: RADIOLOGY
Payer: COMMERCIAL

## 2025-05-29 ENCOUNTER — SOCIAL WORK (OUTPATIENT)
Dept: PALLIATIVE MEDICINE | Facility: CLINIC | Age: 80
End: 2025-05-29
Payer: COMMERCIAL

## 2025-05-29 ENCOUNTER — PATIENT OUTREACH (OUTPATIENT)
Dept: HEMATOLOGY ONCOLOGY | Facility: CLINIC | Age: 80
End: 2025-05-29

## 2025-05-29 VITALS
WEIGHT: 211.5 LBS | HEIGHT: 68 IN | BODY MASS INDEX: 32.05 KG/M2 | OXYGEN SATURATION: 97 % | HEART RATE: 82 BPM | TEMPERATURE: 97.6 F | SYSTOLIC BLOOD PRESSURE: 172 MMHG | RESPIRATION RATE: 18 BRPM | DIASTOLIC BLOOD PRESSURE: 86 MMHG

## 2025-05-29 DIAGNOSIS — Z51.5 PALLIATIVE CARE ENCOUNTER: ICD-10-CM

## 2025-05-29 DIAGNOSIS — Z71.89 COUNSELING AND COORDINATION OF CARE: Primary | ICD-10-CM

## 2025-05-29 DIAGNOSIS — C61 PROSTATE CANCER (HCC): Primary | ICD-10-CM

## 2025-05-29 DIAGNOSIS — C61 PROSTATE CANCER (HCC): ICD-10-CM

## 2025-05-29 DIAGNOSIS — M89.9 BONE LESION: ICD-10-CM

## 2025-05-29 DIAGNOSIS — G89.3 CANCER RELATED PAIN: ICD-10-CM

## 2025-05-29 DIAGNOSIS — Z71.89 GOALS OF CARE, COUNSELING/DISCUSSION: ICD-10-CM

## 2025-05-29 LAB
RAD ONC ARIA COURSE FIRST TREATMENT DATE: NORMAL
RAD ONC ARIA COURSE ID: NORMAL
RAD ONC ARIA COURSE INTENT: NORMAL
RAD ONC ARIA COURSE LAST TREATMENT DATE: NORMAL
RAD ONC ARIA COURSE SESSION NUMBER: 2
RAD ONC ARIA COURSE START DATE: NORMAL
RAD ONC ARIA COURSE TREATMENT ELAPSED DAYS: 1
RAD ONC ARIA PLAN FRACTIONS TREATED TO DATE: 2
RAD ONC ARIA PLAN ID: NORMAL
RAD ONC ARIA PLAN NAME: NORMAL
RAD ONC ARIA PLAN PRESCRIBED DOSE PER FRACTION: 3 GY
RAD ONC ARIA PLAN PRIMARY REFERENCE POINT: NORMAL
RAD ONC ARIA PLAN TOTAL FRACTIONS PRESCRIBED: 10
RAD ONC ARIA PLAN TOTAL PRESCRIBED DOSE: 3000 CGY
RAD ONC ARIA REFERENCE POINT DOSAGE GIVEN TO DATE: 6 GY
RAD ONC ARIA REFERENCE POINT ID: NORMAL
RAD ONC ARIA REFERENCE POINT SESSION DOSAGE GIVEN: 3 GY

## 2025-05-29 PROCEDURE — RECHECK

## 2025-05-29 PROCEDURE — 99204 OFFICE O/P NEW MOD 45 MIN: CPT | Performed by: STUDENT IN AN ORGANIZED HEALTH CARE EDUCATION/TRAINING PROGRAM

## 2025-05-29 PROCEDURE — 77331 SPECIAL RADIATION DOSIMETRY: CPT | Performed by: RADIOLOGY

## 2025-05-29 PROCEDURE — G6002 STEREOSCOPIC X-RAY GUIDANCE: HCPCS | Performed by: INTERNAL MEDICINE

## 2025-05-29 PROCEDURE — 77412 RADIATION TX DELIVERY LVL 3: CPT | Performed by: INTERNAL MEDICINE

## 2025-05-29 PROCEDURE — 77387 GUIDANCE FOR RADJ TX DLVR: CPT | Performed by: INTERNAL MEDICINE

## 2025-05-29 RX ORDER — OXYCODONE HYDROCHLORIDE 5 MG/1
5 TABLET ORAL EVERY 8 HOURS PRN
Start: 2025-05-29

## 2025-05-29 NOTE — PROGRESS NOTES
Name: Ranulfo Krishnamurthy      : 1945      MRN: [id number]  Encounter Provider: Live Grullon DO  Encounter Date: 2025   Encounter department: [facility]  :  Assessment & Plan  Prostate cancer (HCC)  Med/Onc - Dr. Salas  Rad/Onc -  Andolino  10 fractions of RT to bone lesions - doing well with RT treatments. Finds them helpful along with OxyIR PRN  No nausea or vomiting.  Tolerating PO intake.    Well supported by children (Carmen Loyd, Hanane, Lian)  BP in office today of 172/86, patient routinely checks blood pressurs and averages 130-140/60-70. Patient to continue monitoring blood pressures and follow-up with PCP if remains elevated.    Orders:    oxyCODONE (Roxicodone) 5 immediate release tablet; Take 1 tablet (5 mg total) by mouth every 8 (eight) hours as needed for moderate pain Max Daily Amount: 15 mg    naloxone (NARCAN) 4 mg/0.1 mL nasal spray; Administer 1 spray into a nostril. If no response after 2-3 minutes, give another dose in the other nostril using a new spray. For use in emergencies for opioid / pain medication reversal for accidental ingestion, respiratory depression, sedation or concerns for overdose.    Cancer related pain  PET CT 2025  1. Mildly FDG avid left posterior prostate exophytic lesion, SUV max 3.3, with multiple mildly FDG avid lytic osseous lesions and FDG avid iliac chain lymphadenopathy. Findings favored to represent metastatic prostate cancer.  -----------  Well managed with OxyIR at this time   Was started on OxyIR 5 mg q8 hours PRN per patient's PCP.    Patient has only needed to take 1 tablet (5mg) at night time which has helped with managing pain and helping him sleep / not waking up with pain. He is tolerating the Oxycodone without adverse effects (no dizziness, confusion, fatigue from the medication).    Initially had constipation with the Oxycodone, but has Colace and Miralax PRN to avoid OIC.  Patient has not needed to use  his bowel regimen for some time as constipation has not been an issue lately.    Plan:  1) Continue OxyIR 5 mg q8 hours PRN  -patient states he still has a good supply fro his previous script so will hold on refill at this time.  PDMP reviewed.  Palliative Care Opioid agreement reviewed.    2) Bowel regimen to avoid OIC    Medication safety issues - Do not drive under the influence of narcotics (including opioids), watch for adverse effects including confusion / altered mental status / respiratory depression (slowed breathing), keep medications stored in a safe/locked environment, do not use alcohol while opioids or other narcotics are in your system. Do not travel with more than the minimum number of tablets or capsules required for the trip.      Orders:    oxyCODONE (Roxicodone) 5 immediate release tablet; Take 1 tablet (5 mg total) by mouth every 8 (eight) hours as needed for moderate pain Max Daily Amount: 15 mg    naloxone (NARCAN) 4 mg/0.1 mL nasal spray; Administer 1 spray into a nostril. If no response after 2-3 minutes, give another dose in the other nostril using a new spray. For use in emergencies for opioid / pain medication reversal for accidental ingestion, respiratory depression, sedation or concerns for overdose.    Palliative care encounter  Psychosocial   Supportive listening provided  Normalized experience of patient/family  Provided anxiety containment     Referrals Placed / Medical Equipment Ordered  -None    Follow-Up Recommendations  -Follow-up with PCP and current medical specialists  -Follow-up with palliative care: 4 weeks    Orders:    oxyCODONE (Roxicodone) 5 immediate release tablet; Take 1 tablet (5 mg total) by mouth every 8 (eight) hours as needed for moderate pain Max Daily Amount: 15 mg    naloxone (NARCAN) 4 mg/0.1 mL nasal spray; Administer 1 spray into a nostril. If no response after 2-3 minutes, give another dose in the other nostril using a new spray. For use in emergencies  for opioid / pain medication reversal for accidental ingestion, respiratory depression, sedation or concerns for overdose.    Goals of care, counseling/discussion  Pursue full disease focused cares.       Bone lesion             Decisional apparatus: Patient is competent on my exam today. If competence is lost, patient's substitute decision maker would default to children by [place].   Advance Directive / Living Will / POLST: none on file - will revisit     PDMP Review: I have reviewed the patient's controlled substance dispensing history in the Prescription Drug Monitoring Program in compliance with the [facility] regulations before prescribing any controlled substances.    History of Present Illness   Ranulfo Krishnamurthy is a 79 y.o. male who presents for consultation.    Patient is seen today in office. Alert, oriented, pleasantly conversant.  Patient is seen in NAD.    Appetite has been stable.    Pain - no intractable pain requiring escalation. Managed with OxyIR PRN.    Patient is well supported by family.    PET CT 5/1/2025  1. Mildly FDG avid left posterior prostate exophytic lesion, SUV max 3.3, with multiple mildly FDG avid lytic osseous lesions and FDG avid iliac chain lymphadenopathy. Findings favored to represent metastatic prostate cancer. Recommend tissue confirmation and PSMA PET/CT if clinical appropriate.     2. Left lower lobe 1.9 cm pulmonary nodule without significant FDG activity. Findings are indeterminate and may represent incidental benign lesion, non-FDG avid primary lung malignancy, post infectious/inflammatory process, or additional prostate metastatic lesion. Follow-up on PSMA PET/CT and tissue sampling as clinically appropriate.     3. FDG avid left parotid 1.8 cm mass suspicious for primary salivary gland neoplasm such as a pleomorphic adenoma or Warthin's tumor. Consider tissue sampling for further evaluation.    Medical History Reviewed by provider this encounter:  Tobacco  Allergies   "Meds  Problems  Med Hx  Surg Hx  Fam Hx     .  Past Medical History   Past Medical History[1]  Past Surgical History[2]  Family History[3]   reports that he has quit smoking. His smoking use included cigarettes. He has been exposed to tobacco smoke. He has never used smokeless tobacco. He reports current alcohol use. He reports that he does not use drugs.  Current Outpatient Medications   Medication Instructions    acetaminophen (TYLENOL) 1,000 mg, Oral, Every 8 hours PRN    amLODIPine (NORVASC) 5 mg, Oral, 2 times daily    aspirin (ECOTRIN LOW STRENGTH) 81 mg, Daily    atorvastatin (LIPITOR) 20 mg, Oral, Daily    bicalutamide (CASODEX) 50 mg, Oral, Daily    bicalutamide (CASODEX) 50 mg, Oral, Daily    docusate sodium (COLACE) 100 mg, Oral, 3 times daily PRN    fish oil 1,000 mg, Daily    ibuprofen (MOTRIN) 800 mg, Oral, Every 8 hours PRN    LORazepam (ATIVAN) 0.5 mg, Oral, Once, Take 30-60 min prior to MRI    naloxone (NARCAN) 4 mg/0.1 mL nasal spray Administer 1 spray into a nostril. If no response after 2-3 minutes, give another dose in the other nostril using a new spray. For use in emergencies for opioid / pain medication reversal for accidental ingestion, respiratory depression, sedation or concerns for overdose.    oxyCODONE (ROXICODONE) 5 mg, Oral, Every 8 hours PRN    polyethylene glycol (MIRALAX) 17 g, As needed   Allergies[4]   Medications Ordered Prior to Encounter[5]   Social History[6]     Objective   BP (!) 172/86 (BP Location: Left arm, Patient Position: Sitting, Cuff Size: Standard)   Pulse 82   Temp 97.6 °F (36.4 °C) (Temporal)   Resp 18   Ht 5' 8\" (1.727 m)   Wt 95.9 kg (211 lb 8 oz)   SpO2 97%   BMI 32.16 kg/m²     Physical Exam  Vitals reviewed.   Constitutional:       General: He is not in acute distress.     Appearance: He is not ill-appearing, toxic-appearing or diaphoretic.   HENT:      Head: Normocephalic and atraumatic.      Nose: Nose normal.     Eyes:      General:         " Right eye: No discharge.         Left eye: No discharge.       Cardiovascular:      Rate and Rhythm: Normal rate.   Pulmonary:      Effort: Pulmonary effort is normal. No respiratory distress.   Abdominal:      General: Abdomen is flat. There is no distension.     Skin:     General: Skin is warm and dry.      Coloration: Skin is not jaundiced or pale.     Neurological:      General: No focal deficit present.      Mental Status: He is alert. Mental status is at baseline.     Psychiatric:         Mood and Affect: Mood normal.         Behavior: Behavior normal.         Thought Content: Thought content normal.         Judgment: Judgment normal.         Recent labs:  Lab Results   Component Value Date/Time    SODIUM 138 04/17/2025 07:37 AM    SODIUM 141 09/04/2021 08:07 AM    K 4.0 04/17/2025 07:37 AM    K 4.1 09/04/2021 08:07 AM    BUN 17 04/17/2025 07:37 AM    BUN 16 09/04/2021 08:07 AM    CREATININE 1.00 04/17/2025 07:37 AM    GLUC 109 04/14/2025 04:35 AM    GLUC 100 (H) 09/04/2021 08:07 AM    CALCIUM 9.4 04/17/2025 07:37 AM    AST 19 04/17/2025 07:37 AM    AST 17 09/04/2021 08:07 AM    ALT 20 04/17/2025 07:37 AM    ALT 22 09/04/2021 08:07 AM    ALB 4.1 04/17/2025 07:37 AM    TP 7.5 04/17/2025 07:37 AM    TP 7.1 04/17/2025 07:37 AM    TP 7.3 09/04/2021 08:07 AM    EGFR 70 04/17/2025 07:37 AM     Lab Results   Component Value Date/Time    HGB 13.5 05/14/2025 08:44 AM    WBC 9.01 05/14/2025 08:44 AM     05/14/2025 08:44 AM     Lab Results   Component Value Date/Time    [id number] 3.056 04/17/2025 07:37 AM       Recent Imaging:  Procedure: IR biopsy bone marrow  Result Date: 5/15/2025  Impression: Bone marrow aspiration and biopsy. Workstation performed: [id number]     Procedure: NM PET CT skull base to mid thigh  Result Date: 5/2/2025  Impression: 1. Mildly FDG avid left posterior prostate exophytic lesion, SUV max 3.3, with multiple mildly FDG avid lytic osseous lesions and FDG avid iliac chain  lymphadenopathy. Findings favored to represent metastatic prostate cancer. Recommend tissue confirmation and PSMA PET/CT if clinical appropriate. 2. Left lower lobe 1.9 cm pulmonary nodule without significant FDG activity. Findings are indeterminate and may represent incidental benign lesion, non-FDG avid primary lung malignancy, post infectious/inflammatory process, or additional prostate metastatic lesion. Follow-up on PSMA PET/CT and tissue sampling as clinically appropriate. 3. FDG avid left parotid 1.8 cm mass suspicious for primary salivary gland neoplasm such as a pleomorphic adenoma or Warthin's tumor. Consider tissue sampling for further evaluation. Resident: MELINA ARELLANO I, the attending radiologist, have reviewed the images and agree with the final report above. Workstation performed: [id number]     Procedure: CT recon only lumbar spine  Result Date: 4/14/2025  Impression: 1. 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 Evaluation with MRI of the lumbar spine with contrast is recommended. Lower thoracic spine should be included versus obtaining a separate MRI of the thoracic spine. 2. Left common iliac and left external iliac adenopathy which could be related to metastasis or lymphoproliferative disease. I personally discussed this study with Helder Guerrero on 4/14/2025 6:22 AM. Workstation performed: [id number]     Procedure: CT abdomen pelvis with contrast  Result Date: 4/14/2025  Impression: 1. Left common iliac and external iliac adenopathy with nonspecific mildly prominent mesenteric nodes. Differential diagnosis includes metastases from prostate cancer or other malignancy or lymphoma. 2. Multiple metastatic bony lesions in the thoracic, lumbar spine and left iliac bone with pathologic compression at T11. Mass extending in the canal at L2 causing canal stenosis. Evaluation with MRI of the lower  thoracic thoracic and lumbar spine is recommended. I personally discussed this study with Adan Guerrero on 4/14/2025 6:23 AM. Workstation performed: [id number]       Administrative Statements   I have spent a total time of 38 minutes in caring for this patient on the day of the visit/encounter including Risks and benefits of tx options, Instructions for management, Patient and family education, Importance of tx compliance, Risk factor reductions, Impressions, Counseling / Coordination of care, Documenting in the medical record, Reviewing/placing orders in the medical record (including tests, medications, and/or procedures), and Obtaining or reviewing history  .   Topics discussed with the patient / family include symptom assessment and management, medication review, medication adjustment, psychosocial support, advanced directives, goals of care, supportive listening, and anticipatory guidance.       [1]   Past Medical History:  Diagnosis Date    Back pain     Prostate cancer metastatic to bone (HCC) 2025   [2]   Past Surgical History:  Procedure Laterality Date    IR BIOPSY BONE MARROW  5/14/2025   [3]   Family History  Problem Relation Name Age of Onset    Cancer Mother      Heart attack Father  50   [4]   Allergies  Allergen Reactions    Lisinopril Cough   [5]   Current Outpatient Medications on File Prior to Visit   Medication Sig Dispense Refill    acetaminophen (TYLENOL) 500 mg tablet Take 2 tablets (1,000 mg total) by mouth every 8 (eight) hours as needed for mild pain 30 tablet 5    amLODIPine (NORVASC) 5 mg tablet Take 1 tablet (5 mg total) by mouth 2 (two) times a day 180 tablet 1    aspirin (ECOTRIN LOW STRENGTH) 81 mg EC tablet Take 81 mg by mouth in the morning.      atorvastatin (LIPITOR) 20 mg tablet Take 1 tablet (20 mg total) by mouth daily 30 tablet 3    docusate sodium (COLACE) 100 mg capsule Take 1 capsule (100 mg total) by mouth 3 (three) times a day as needed for constipation 30 capsule 5     ibuprofen (MOTRIN) 800 mg tablet Take 1 tablet (800 mg total) by mouth every 8 (eight) hours as needed for mild pain 30 tablet 5    Omega-3 Fatty Acids (FISH OIL) 1,000 mg Take 1,000 mg by mouth in the morning.      polyethylene glycol (MiraLax) 17 g packet Take 17 g by mouth if needed      bicalutamide (CASODEX) 50 mg tablet Take 1 tablet (50 mg total) by mouth daily (Patient not taking: Reported on 5/29/2025) 30 tablet 1    LORazepam (Ativan) 0.5 mg tablet Take 1 tablet (0.5 mg total) by mouth 1 (one) time for 1 dose Take 30-60 min prior to MRI Do not start before May 6, 2025. 1 tablet 0     No current facility-administered medications on file prior to visit.   [6]   Social History  Tobacco Use    Smoking status: Former     Current packs/day: 0.25     Types: Cigarettes     Passive exposure: Past    Smokeless tobacco: Never   Vaping Use    Vaping status: Never Used   Substance and Sexual Activity    Alcohol use: Yes    Drug use: Never    Sexual activity: Yes     Partners: Female

## 2025-05-29 NOTE — PROGRESS NOTES
Name: Ranulfo Krishnamurthy      : 1945      MRN: [id number]  Encounter Provider: AN RAD ONC RES  Encounter Date: 2025   Encounter department: [facility] RADIATION ONCOLOGY  :  Assessment & Plan          {Decisional Apparatus and POLST Statements (Optional):96478}    PDMP Review: I have reviewed the patient's controlled substance dispensing history in the Prescription Drug Monitoring Program in compliance with the [facility] regulations before prescribing any controlled substances.    History of Present Illness {?Quick Links Encounters * My Last Note * Last Note in Specialty * Snapshot * Since Last Visit * History :99094}  Ranulfo Krishnamurthy is a 79 y.o. male who presents for consultation.      NM PET CT 2025  IMPRESSION:  1. Mildly FDG avid left posterior prostate exophytic lesion, SUV max 3.3, with multiple mildly FDG avid lytic osseous lesions and FDG avid iliac chain lymphadenopathy. Findings favored to represent metastatic prostate cancer. Recommend tissue confirmation and PSMA PET/CT if clinical appropriate.     2. Left lower lobe 1.9 cm pulmonary nodule without significant FDG activity. Findings are indeterminate and may represent incidental benign lesion, non-FDG avid primary lung malignancy, post infectious/inflammatory process, or additional prostate   metastatic lesion. Follow-up on PSMA PET/CT and tissue sampling as clinically appropriate.     3. FDG avid left parotid 1.8 cm mass suspicious for primary salivary gland neoplasm such as a pleomorphic adenoma or Warthin's tumor. Consider tissue sampling for further evaluation.        Medical History Reviewed by provider this encounter:     .  Past Medical History   Past Medical History[1]  Past Surgical History[2]  Family History[3]   reports that he has quit smoking. His smoking use included cigarettes. He has been exposed to tobacco smoke. He has never used smokeless tobacco. He reports current alcohol use. He reports that he  does not use drugs.  Current Outpatient Medications   Medication Instructions    acetaminophen (TYLENOL) 1,000 mg, Oral, Every 8 hours PRN    amLODIPine (NORVASC) 5 mg, Oral, 2 times daily    aspirin (ECOTRIN LOW STRENGTH) 81 mg, Daily    atorvastatin (LIPITOR) 20 mg, Oral, Daily    bicalutamide (CASODEX) 50 mg, Oral, Daily    bicalutamide (CASODEX) 50 mg, Oral, Daily    docusate sodium (COLACE) 100 mg, Oral, 3 times daily PRN    fish oil 1,000 mg, Daily    ibuprofen (MOTRIN) 800 mg, Oral, Every 8 hours PRN    LORazepam (ATIVAN) 0.5 mg, Oral, Once, Take 30-60 min prior to MRI    polyethylene glycol (MIRALAX) 17 g, As needed   Allergies[4]   Medications Ordered Prior to Encounter[5]   Social History[6]     Objective {?Quick Links Trend Vitals * Enter New Vitals * Results Review * Timeline (Adult) * Labs * Imaging * Cardiology * Procedures * Lung Cancer Screening * Surgical eConsent :98310}  There were no vitals taken for this visit.    Physical Exam    Recent labs:  Lab Results   Component Value Date/Time    SODIUM 138 04/17/2025 07:37 AM    SODIUM 141 09/04/2021 08:07 AM    K 4.0 04/17/2025 07:37 AM    K 4.1 09/04/2021 08:07 AM    BUN 17 04/17/2025 07:37 AM    BUN 16 09/04/2021 08:07 AM    CREATININE 1.00 04/17/2025 07:37 AM    GLUC 109 04/14/2025 04:35 AM    GLUC 100 (H) 09/04/2021 08:07 AM    CALCIUM 9.4 04/17/2025 07:37 AM    AST 19 04/17/2025 07:37 AM    AST 17 09/04/2021 08:07 AM    ALT 20 04/17/2025 07:37 AM    ALT 22 09/04/2021 08:07 AM    ALB 4.1 04/17/2025 07:37 AM    TP 7.5 04/17/2025 07:37 AM    TP 7.1 04/17/2025 07:37 AM    TP 7.3 09/04/2021 08:07 AM    EGFR 70 04/17/2025 07:37 AM     Lab Results   Component Value Date/Time    HGB 13.5 05/14/2025 08:44 AM    WBC 9.01 05/14/2025 08:44 AM     05/14/2025 08:44 AM     Lab Results   Component Value Date/Time    SUH2WMWJBTIC 3.056 04/17/2025 07:37 AM       Recent Imaging:  Procedure: IR biopsy bone marrow  Result Date: 5/15/2025  Impression: Bone marrow  aspiration and biopsy. Workstation performed: [id number]     Procedure: NM PET CT skull base to mid thigh  Result Date: 5/2/2025  Impression: 1. Mildly FDG avid left posterior prostate exophytic lesion, SUV max 3.3, with multiple mildly FDG avid lytic osseous lesions and FDG avid iliac chain lymphadenopathy. Findings favored to represent metastatic prostate cancer. Recommend tissue confirmation and PSMA PET/CT if clinical appropriate. 2. Left lower lobe 1.9 cm pulmonary nodule without significant FDG activity. Findings are indeterminate and may represent incidental benign lesion, non-FDG avid primary lung malignancy, post infectious/inflammatory process, or additional prostate metastatic lesion. Follow-up on PSMA PET/CT and tissue sampling as clinically appropriate. 3. FDG avid left parotid 1.8 cm mass suspicious for primary salivary gland neoplasm such as a pleomorphic adenoma or Warthin's tumor. Consider tissue sampling for further evaluation. Resident: MELINA ARELLANO I, the attending radiologist, have reviewed the images and agree with the final report above. Workstation performed: [id number]     Procedure: CT recon only lumbar spine  Result Date: 4/14/2025  Impression: 1. 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 Evaluation with MRI of the lumbar spine with contrast is recommended. Lower thoracic spine should be included versus obtaining a separate MRI of the thoracic spine. 2. Left common iliac and left external iliac adenopathy which could be related to metastasis or lymphoproliferative disease. I personally discussed this study with Helder Guerrero on 4/14/2025 6:22 AM. Workstation performed: [id number]     Procedure: CT abdomen pelvis with contrast  Result Date: 4/14/2025  Impression: 1. Left common iliac and external iliac adenopathy with nonspecific mildly prominent mesenteric nodes. Differential  diagnosis includes metastases from prostate cancer or other malignancy or lymphoma. 2. Multiple metastatic bony lesions in the thoracic, lumbar spine and left iliac bone with pathologic compression at T11. Mass extending in the canal at L2 causing canal stenosis. Evaluation with MRI of the lower thoracic thoracic and lumbar spine is recommended. I personally discussed this study with Adan Cesar on 4/14/2025 6:23 AM. Workstation performed: [id number]       Administrative Statements {?Quick Links Full Problem List * Level of Service * PCM/PCSP:27664}  I have spent a total time of *** minutes in caring for this patient on the day of the visit/encounter including {Counseling Topics:3022692826}.   Topics discussed with the patient / family include {Palliative Care Discussed Topics:15784}.       [1]   Past Medical History:  Diagnosis Date    Back pain     Prostate cancer metastatic to bone (HCC) 2025   [2]   Past Surgical History:  Procedure Laterality Date    IR BIOPSY BONE MARROW  5/14/2025   [3]   Family History  Problem Relation Name Age of Onset    Cancer Mother      Heart attack Father  50   [4]   Allergies  Allergen Reactions    Lisinopril Cough   [5]   Current Outpatient Medications on File Prior to Visit   Medication Sig Dispense Refill    acetaminophen (TYLENOL) 500 mg tablet Take 2 tablets (1,000 mg total) by mouth every 8 (eight) hours as needed for mild pain 30 tablet 5    amLODIPine (NORVASC) 5 mg tablet Take 1 tablet (5 mg total) by mouth 2 (two) times a day 180 tablet 1    aspirin (ECOTRIN LOW STRENGTH) 81 mg EC tablet Take 81 mg by mouth in the morning.      atorvastatin (LIPITOR) 20 mg tablet Take 1 tablet (20 mg total) by mouth daily 30 tablet 3    bicalutamide (CASODEX) 50 mg tablet Take 1 tablet (50 mg total) by mouth daily 30 tablet 0    bicalutamide (CASODEX) 50 mg tablet Take 1 tablet (50 mg total) by mouth daily (Patient not taking: Reported on 5/29/2025) 30 tablet 1    docusate sodium  (COLACE) 100 mg capsule Take 1 capsule (100 mg total) by mouth 3 (three) times a day as needed for constipation 30 capsule 5    ibuprofen (MOTRIN) 800 mg tablet Take 1 tablet (800 mg total) by mouth every 8 (eight) hours as needed for mild pain 30 tablet 5    LORazepam (Ativan) 0.5 mg tablet Take 1 tablet (0.5 mg total) by mouth 1 (one) time for 1 dose Take 30-60 min prior to MRI Do not start before May 6, 2025. 1 tablet 0    Omega-3 Fatty Acids (FISH OIL) 1,000 mg Take 1,000 mg by mouth in the morning.      polyethylene glycol (MiraLax) 17 g packet Take 17 g by mouth if needed       No current facility-administered medications on file prior to visit.   [6]   Social History  Tobacco Use    Smoking status: Former     Current packs/day: 0.25     Types: Cigarettes     Passive exposure: Past    Smokeless tobacco: Never   Vaping Use    Vaping status: Never Used   Substance and Sexual Activity    Alcohol use: Yes    Drug use: Never    Sexual activity: Yes     Partners: Female

## 2025-05-29 NOTE — ASSESSMENT & PLAN NOTE
Med/Onc - Dr. Salas  Rad/Onc -  Andolino  10 fractions of RT to bone lesions - doing well with RT treatments. Finds them helpful along with OxyIR PRN  No nausea or vomiting.  Tolerating PO intake.    Well supported by children (Carmen Loyd, Hanane, Lian)  BP in office today of 172/86, patient routinely checks blood pressurs and averages 130-140/60-70. Patient to continue monitoring blood pressures and follow-up with PCP if remains elevated.    Orders:    oxyCODONE (Roxicodone) 5 immediate release tablet; Take 1 tablet (5 mg total) by mouth every 8 (eight) hours as needed for moderate pain Max Daily Amount: 15 mg    naloxone (NARCAN) 4 mg/0.1 mL nasal spray; Administer 1 spray into a nostril. If no response after 2-3 minutes, give another dose in the other nostril using a new spray. For use in emergencies for opioid / pain medication reversal for accidental ingestion, respiratory depression, sedation or concerns for overdose.

## 2025-05-29 NOTE — ASSESSMENT & PLAN NOTE
Psychosocial   Supportive listening provided  Normalized experience of patient/family  Provided anxiety containment     Referrals Placed / Medical Equipment Ordered  -None    Follow-Up Recommendations  -Follow-up with PCP and current medical specialists  -Follow-up with palliative care: 4 weeks    Orders:    oxyCODONE (Roxicodone) 5 immediate release tablet; Take 1 tablet (5 mg total) by mouth every 8 (eight) hours as needed for moderate pain Max Daily Amount: 15 mg    naloxone (NARCAN) 4 mg/0.1 mL nasal spray; Administer 1 spray into a nostril. If no response after 2-3 minutes, give another dose in the other nostril using a new spray. For use in emergencies for opioid / pain medication reversal for accidental ingestion, respiratory depression, sedation or concerns for overdose.

## 2025-05-29 NOTE — ASSESSMENT & PLAN NOTE
PET CT 5/1/2025  1. Mildly FDG avid left posterior prostate exophytic lesion, SUV max 3.3, with multiple mildly FDG avid lytic osseous lesions and FDG avid iliac chain lymphadenopathy. Findings favored to represent metastatic prostate cancer.  -----------  Well managed with OxyIR at this time   Was started on OxyIR 5 mg q8 hours PRN per patient's PCP.    Patient has only needed to take 1 tablet (5mg) at night time which has helped with managing pain and helping him sleep / not waking up with pain. He is tolerating the Oxycodone without adverse effects (no dizziness, confusion, fatigue from the medication).    Initially had constipation with the Oxycodone, but has Colace and Miralax PRN to avoid OIC.  Patient has not needed to use his bowel regimen for some time as constipation has not been an issue lately.    Plan:  1) Continue OxyIR 5 mg q8 hours PRN  -patient states he still has a good supply fro his previous script so will hold on refill at this time.  PDMP reviewed.  Palliative Care Opioid agreement reviewed.    2) Bowel regimen to avoid OIC    Medication safety issues - Do not drive under the influence of narcotics (including opioids), watch for adverse effects including confusion / altered mental status / respiratory depression (slowed breathing), keep medications stored in a safe/locked environment, do not use alcohol while opioids or other narcotics are in your system. Do not travel with more than the minimum number of tablets or capsules required for the trip.      Orders:    oxyCODONE (Roxicodone) 5 immediate release tablet; Take 1 tablet (5 mg total) by mouth every 8 (eight) hours as needed for moderate pain Max Daily Amount: 15 mg    naloxone (NARCAN) 4 mg/0.1 mL nasal spray; Administer 1 spray into a nostril. If no response after 2-3 minutes, give another dose in the other nostril using a new spray. For use in emergencies for opioid / pain medication reversal for accidental ingestion, respiratory  depression, sedation or concerns for overdose.

## 2025-05-29 NOTE — PROGRESS NOTES
Palliative Outpatient Assessment of Need    SW completed an assessment of need which was completed with patient and son Cheyenne” in the office.    Relationship status:  “Wife passed away two years ago.”  Duration of relationship:   Name of significant other:   Children and Ages: Pt has 5 adult children 3 daughters and 2 sons.  Pets: 1 Dog  Other important family information:   Living situation (where and whom): Pt lives in the home with son “Evert.”  Patient's primary caregiver:  Self  Any limitations of caregiver:   Environmental concerns or barriers:   history: None  Employment history/source of income: Retired   Disability: SSD  Concerns regarding literacy: None  Spirituality/ Islam: Sabianist  Patient's strengths, social supports, and resources: Supportive Family and Friends   Cultural information:   Mental Health current or previous: Pt reports mental health is stable.   Substance use or history: Former cigarette smoker.   Sleep: Pt reports no concerns with sleep.  Exercise: Pt reports physical activity is limited currently. Pt does enjoy going on walks.  Diet/nutrition:  Pt reports no concerns with appetite nausea or vomiting. Nutritional supplements samples provided to pt.  Bowel Movement: Pt reports no concerns with bowel movements. Pt is on a current bowel regimen due to opioid usage.  Durable Medical Equipment needs: Cane.  Transportation: No Transportation Concerns  Financial concerns: No Financial Concerns  Advanced Directive: Pt reports having a living will/POA paper completed with . Pt has made all is children HCR.  Other medical or social work providers involved: Hem Onc, Radiology, Infusion, Fam Med.  Patient/caregiver current level of coping: Pt reports taking one day at a time.  Understanding: Pt appears understanding of current medical status  Patient/family concerns and areas of need: Cancer related pain well managed with pain management.  Patient's interests: Pt would  like to continue following up with [facility] for GOC and additional support. See plan under palliative care.      I have spent 60 minutes with patient and family today in which greater than 50% of this time was spent in counseling/coordination of care.    *All questions may not be answered due to constraints.  Follow-up discussions may need to occur.

## 2025-05-29 NOTE — PROGRESS NOTES
I reached out and spoke with Ranulfo,.  He has been seen in consult by Medical Oncology and Radiation Oncology. I introduced myself and explained my role as their Patient Navigator. I reviewed for any barriers to care and offered referrals to supportive services as needed. I reviewed and updated the members assigned to the care team in [facility]. He knows the members of the care team as well as how and when to contact them with any needs.     Distress Thermometer completed at this time. Patient scored 0/10. Based on responses to DT, no indication for referral to SW needed at this time. .     He is currently able to drive and denies any transportation needs.      He is already established with Palliative Care.     He states that he is eating and drinking as per usual with no unintentional weight loss.   Completed MST and patient Does not meet criteria for referral to Oncology Dietician Services    Patient is currently smoking. We reviewed the smoking cessation program. I offered to place a referral but patient declines at this time. He is smoking and thinks he can quit on his own    He states he is well supported by family and friends.  Community support groups discussed including the Cancer Support Community of the [facility]. Patient declined information at this time.     He feels he has adequate insurance coverage and denies any financial concerns at this time.     He verbalizes managing the schedules well.   Future Appointments   Date Time Provider Department Center   5/30/2025 10:00 AM AN RADONC RESOURCE AN Rad Onc AN HOSP CC   6/2/2025  8:00 AM AN RADONC RESOURCE AN Rad Onc AN HOSP CC   6/2/2025 10:00 AM Jayden Oswald MD AN Rad Onc AN HOSP CC   6/3/2025  8:00 AM AN RADONC RESOURCE AN Rad Onc AN HOSP CC   6/4/2025  8:00 AM AN RADONC RESOURCE AN Rad Onc AN HOSP CC   6/4/2025  8:30 AM  INF CHAIR 11  [facility]   6/5/2025  8:00 AM AN RADONC RESOURCE AN Rad Onc AN HOSP CC   6/6/2025  8:45 AM AN  RADONC RESOURCE AN Rad Onc AN HOSP CC   6/9/2025 10:00 AM Jayden Oswald MD AN Rad Onc AN HOSP CC   6/9/2025 10:00 AM AN RADONC RESOURCE AN Rad Onc AN HOSP CC   6/10/2025 10:00 AM AN RADONC RESOURCE AN Rad Onc AN HOSP CC   6/23/2025  7:30 AM BE NM SLN PET RM 2 BE SLN NM BE NORTH   6/26/2025  8:00 AM DO DARNELL [facility] Practice-Hos   7/8/2025  8:00 AM Chyna Salas MD HAYLIE ONC [facility] Practice-Onc   10/9/2025 11:00 AM YAQUELIN Ac S MTN  Practice-[place]     He does not have any questions or concerns at this time. His treatments are going well.    This call took 10 minutes to complete.    Based on individual needs I will follow up in about 4 weeks. I have provided my direct contact information and welcome them to contact me if needs as discussed above change. He was appreciative for the call.

## 2025-05-30 ENCOUNTER — APPOINTMENT (OUTPATIENT)
Dept: RADIATION ONCOLOGY | Facility: HOSPITAL | Age: 80
End: 2025-05-30
Attending: RADIOLOGY
Payer: COMMERCIAL

## 2025-05-30 DIAGNOSIS — C61 PROSTATE CANCER (HCC): ICD-10-CM

## 2025-05-30 LAB
RAD ONC ARIA COURSE FIRST TREATMENT DATE: NORMAL
RAD ONC ARIA COURSE ID: NORMAL
RAD ONC ARIA COURSE INTENT: NORMAL
RAD ONC ARIA COURSE LAST TREATMENT DATE: NORMAL
RAD ONC ARIA COURSE SESSION NUMBER: 3
RAD ONC ARIA COURSE START DATE: NORMAL
RAD ONC ARIA COURSE TREATMENT ELAPSED DAYS: 2
RAD ONC ARIA PLAN FRACTIONS TREATED TO DATE: 3
RAD ONC ARIA PLAN ID: NORMAL
RAD ONC ARIA PLAN NAME: NORMAL
RAD ONC ARIA PLAN PRESCRIBED DOSE PER FRACTION: 3 GY
RAD ONC ARIA PLAN PRIMARY REFERENCE POINT: NORMAL
RAD ONC ARIA PLAN TOTAL FRACTIONS PRESCRIBED: 10
RAD ONC ARIA PLAN TOTAL PRESCRIBED DOSE: 3000 CGY
RAD ONC ARIA REFERENCE POINT DOSAGE GIVEN TO DATE: 9 GY
RAD ONC ARIA REFERENCE POINT ID: NORMAL
RAD ONC ARIA REFERENCE POINT SESSION DOSAGE GIVEN: 3 GY

## 2025-05-30 PROCEDURE — 77387 GUIDANCE FOR RADJ TX DLVR: CPT | Performed by: STUDENT IN AN ORGANIZED HEALTH CARE EDUCATION/TRAINING PROGRAM

## 2025-05-30 PROCEDURE — G6002 STEREOSCOPIC X-RAY GUIDANCE: HCPCS | Performed by: STUDENT IN AN ORGANIZED HEALTH CARE EDUCATION/TRAINING PROGRAM

## 2025-05-30 PROCEDURE — 77412 RADIATION TX DELIVERY LVL 3: CPT | Performed by: STUDENT IN AN ORGANIZED HEALTH CARE EDUCATION/TRAINING PROGRAM

## 2025-05-30 RX ORDER — BICALUTAMIDE 50 MG/1
50 TABLET, FILM COATED ORAL DAILY
Qty: 30 TABLET | Refills: 2 | Status: SHIPPED | OUTPATIENT
Start: 2025-05-30

## 2025-05-31 PROBLEM — M89.9 BONE LESION: Status: ACTIVE | Noted: 2025-05-31

## 2025-06-02 ENCOUNTER — APPOINTMENT (OUTPATIENT)
Dept: RADIATION ONCOLOGY | Facility: HOSPITAL | Age: 80
End: 2025-06-02
Attending: RADIOLOGY
Payer: COMMERCIAL

## 2025-06-02 LAB
RAD ONC ARIA COURSE FIRST TREATMENT DATE: NORMAL
RAD ONC ARIA COURSE ID: NORMAL
RAD ONC ARIA COURSE INTENT: NORMAL
RAD ONC ARIA COURSE LAST TREATMENT DATE: NORMAL
RAD ONC ARIA COURSE SESSION NUMBER: 4
RAD ONC ARIA COURSE START DATE: NORMAL
RAD ONC ARIA COURSE TREATMENT ELAPSED DAYS: 5
RAD ONC ARIA PLAN FRACTIONS TREATED TO DATE: 4
RAD ONC ARIA PLAN ID: NORMAL
RAD ONC ARIA PLAN NAME: NORMAL
RAD ONC ARIA PLAN PRESCRIBED DOSE PER FRACTION: 3 GY
RAD ONC ARIA PLAN PRIMARY REFERENCE POINT: NORMAL
RAD ONC ARIA PLAN TOTAL FRACTIONS PRESCRIBED: 10
RAD ONC ARIA PLAN TOTAL PRESCRIBED DOSE: 3000 CGY
RAD ONC ARIA REFERENCE POINT DOSAGE GIVEN TO DATE: 12 GY
RAD ONC ARIA REFERENCE POINT ID: NORMAL
RAD ONC ARIA REFERENCE POINT SESSION DOSAGE GIVEN: 3 GY

## 2025-06-02 PROCEDURE — G6002 STEREOSCOPIC X-RAY GUIDANCE: HCPCS | Performed by: RADIOLOGY

## 2025-06-02 PROCEDURE — 77387 GUIDANCE FOR RADJ TX DLVR: CPT | Performed by: RADIOLOGY

## 2025-06-02 PROCEDURE — 77412 RADIATION TX DELIVERY LVL 3: CPT | Performed by: RADIOLOGY

## 2025-06-03 ENCOUNTER — APPOINTMENT (OUTPATIENT)
Dept: RADIATION ONCOLOGY | Facility: HOSPITAL | Age: 80
End: 2025-06-03
Attending: RADIOLOGY
Payer: COMMERCIAL

## 2025-06-03 LAB
RAD ONC ARIA COURSE FIRST TREATMENT DATE: NORMAL
RAD ONC ARIA COURSE ID: NORMAL
RAD ONC ARIA COURSE INTENT: NORMAL
RAD ONC ARIA COURSE LAST TREATMENT DATE: NORMAL
RAD ONC ARIA COURSE SESSION NUMBER: 5
RAD ONC ARIA COURSE START DATE: NORMAL
RAD ONC ARIA COURSE TREATMENT ELAPSED DAYS: 6
RAD ONC ARIA PLAN FRACTIONS TREATED TO DATE: 5
RAD ONC ARIA PLAN ID: NORMAL
RAD ONC ARIA PLAN NAME: NORMAL
RAD ONC ARIA PLAN PRESCRIBED DOSE PER FRACTION: 3 GY
RAD ONC ARIA PLAN PRIMARY REFERENCE POINT: NORMAL
RAD ONC ARIA PLAN TOTAL FRACTIONS PRESCRIBED: 10
RAD ONC ARIA PLAN TOTAL PRESCRIBED DOSE: 3000 CGY
RAD ONC ARIA REFERENCE POINT DOSAGE GIVEN TO DATE: 15 GY
RAD ONC ARIA REFERENCE POINT ID: NORMAL
RAD ONC ARIA REFERENCE POINT SESSION DOSAGE GIVEN: 3 GY

## 2025-06-03 PROCEDURE — G6002 STEREOSCOPIC X-RAY GUIDANCE: HCPCS | Performed by: STUDENT IN AN ORGANIZED HEALTH CARE EDUCATION/TRAINING PROGRAM

## 2025-06-03 PROCEDURE — 77387 GUIDANCE FOR RADJ TX DLVR: CPT | Performed by: STUDENT IN AN ORGANIZED HEALTH CARE EDUCATION/TRAINING PROGRAM

## 2025-06-03 PROCEDURE — 77412 RADIATION TX DELIVERY LVL 3: CPT | Performed by: STUDENT IN AN ORGANIZED HEALTH CARE EDUCATION/TRAINING PROGRAM

## 2025-06-03 PROCEDURE — 77336 RADIATION PHYSICS CONSULT: CPT | Performed by: RADIOLOGY

## 2025-06-04 ENCOUNTER — HOSPITAL ENCOUNTER (OUTPATIENT)
Dept: INFUSION CENTER | Facility: CLINIC | Age: 80
Discharge: HOME | End: 2025-06-04
Attending: INTERNAL MEDICINE
Payer: COMMERCIAL

## 2025-06-04 ENCOUNTER — APPOINTMENT (OUTPATIENT)
Dept: RADIATION ONCOLOGY | Facility: HOSPITAL | Age: 80
End: 2025-06-04
Attending: RADIOLOGY
Payer: COMMERCIAL

## 2025-06-04 DIAGNOSIS — C61 PROSTATE CANCER (HCC): Primary | ICD-10-CM

## 2025-06-04 LAB
RAD ONC ARIA COURSE FIRST TREATMENT DATE: NORMAL
RAD ONC ARIA COURSE ID: NORMAL
RAD ONC ARIA COURSE INTENT: NORMAL
RAD ONC ARIA COURSE LAST TREATMENT DATE: NORMAL
RAD ONC ARIA COURSE SESSION NUMBER: 6
RAD ONC ARIA COURSE START DATE: NORMAL
RAD ONC ARIA COURSE TREATMENT ELAPSED DAYS: 7
RAD ONC ARIA PLAN FRACTIONS TREATED TO DATE: 6
RAD ONC ARIA PLAN ID: NORMAL
RAD ONC ARIA PLAN NAME: NORMAL
RAD ONC ARIA PLAN PRESCRIBED DOSE PER FRACTION: 3 GY
RAD ONC ARIA PLAN PRIMARY REFERENCE POINT: NORMAL
RAD ONC ARIA PLAN TOTAL FRACTIONS PRESCRIBED: 10
RAD ONC ARIA PLAN TOTAL PRESCRIBED DOSE: 3000 CGY
RAD ONC ARIA REFERENCE POINT DOSAGE GIVEN TO DATE: 18 GY
RAD ONC ARIA REFERENCE POINT ID: NORMAL
RAD ONC ARIA REFERENCE POINT SESSION DOSAGE GIVEN: 3 GY

## 2025-06-04 PROCEDURE — G6002 STEREOSCOPIC X-RAY GUIDANCE: HCPCS | Performed by: RADIOLOGY

## 2025-06-04 PROCEDURE — 77387 GUIDANCE FOR RADJ TX DLVR: CPT | Performed by: RADIOLOGY

## 2025-06-04 PROCEDURE — 77412 RADIATION TX DELIVERY LVL 3: CPT | Performed by: RADIOLOGY

## 2025-06-04 PROCEDURE — 96402 CHEMO HORMON ANTINEOPL SQ/IM: CPT

## 2025-06-04 PROCEDURE — 77427 RADIATION TX MANAGEMENT X5: CPT | Performed by: RADIOLOGY

## 2025-06-04 RX ADMIN — LEUPROLIDE ACETATE 7.5 MG: KIT at 08:11

## 2025-06-04 NOTE — PLAN OF CARE
Problem: Potential for Falls  Goal: Patient will remain free of falls  Description: INTERVENTIONS:  - Educate patient/family on patient safety including physical limitations  - Instruct patient to call for assistance with activity   - Consider consulting OT/PT to assist with strengthening/mobility based on AM PAC & JH-HLM score  - Consult OT/PT to assist with strengthening/mobility   - Keep Call bell within reach  - Keep bed low and locked with side rails adjusted as appropriate  - Keep care items and personal belongings within reach  - Initiate and maintain comfort rounds  - Make Fall Risk Sign visible to staff  Outcome: Completed     Problem: Knowledge Deficit  Goal: Patient/family/caregiver demonstrates understanding of disease process, treatment plan, medications, and discharge instructions  Description: Complete learning assessment and assess knowledge base.  Interventions:  - Provide teaching at level of understanding  - Provide teaching via preferred learning methods  Outcome: Completed     
unkn

## 2025-06-04 NOTE — PROGRESS NOTES
Pt to clinic for lupron injection. Pt tolerated in right buttocks. Next appointment July 1 at 8:30

## 2025-06-05 ENCOUNTER — APPOINTMENT (OUTPATIENT)
Dept: RADIATION ONCOLOGY | Facility: HOSPITAL | Age: 80
End: 2025-06-05
Attending: RADIOLOGY
Payer: COMMERCIAL

## 2025-06-05 LAB
RAD ONC ARIA COURSE FIRST TREATMENT DATE: NORMAL
RAD ONC ARIA COURSE ID: NORMAL
RAD ONC ARIA COURSE INTENT: NORMAL
RAD ONC ARIA COURSE LAST TREATMENT DATE: NORMAL
RAD ONC ARIA COURSE SESSION NUMBER: 7
RAD ONC ARIA COURSE START DATE: NORMAL
RAD ONC ARIA COURSE TREATMENT ELAPSED DAYS: 8
RAD ONC ARIA PLAN FRACTIONS TREATED TO DATE: 7
RAD ONC ARIA PLAN ID: NORMAL
RAD ONC ARIA PLAN NAME: NORMAL
RAD ONC ARIA PLAN PRESCRIBED DOSE PER FRACTION: 3 GY
RAD ONC ARIA PLAN PRIMARY REFERENCE POINT: NORMAL
RAD ONC ARIA PLAN TOTAL FRACTIONS PRESCRIBED: 10
RAD ONC ARIA PLAN TOTAL PRESCRIBED DOSE: 3000 CGY
RAD ONC ARIA REFERENCE POINT DOSAGE GIVEN TO DATE: 21 GY
RAD ONC ARIA REFERENCE POINT ID: NORMAL
RAD ONC ARIA REFERENCE POINT SESSION DOSAGE GIVEN: 3 GY

## 2025-06-05 PROCEDURE — 77412 RADIATION TX DELIVERY LVL 3: CPT | Performed by: RADIOLOGY

## 2025-06-05 PROCEDURE — G6002 STEREOSCOPIC X-RAY GUIDANCE: HCPCS | Performed by: RADIOLOGY

## 2025-06-05 PROCEDURE — 77387 GUIDANCE FOR RADJ TX DLVR: CPT | Performed by: RADIOLOGY

## 2025-06-06 ENCOUNTER — APPOINTMENT (OUTPATIENT)
Dept: RADIATION ONCOLOGY | Facility: HOSPITAL | Age: 80
End: 2025-06-06
Attending: RADIOLOGY
Payer: COMMERCIAL

## 2025-06-06 LAB
RAD ONC ARIA COURSE FIRST TREATMENT DATE: NORMAL
RAD ONC ARIA COURSE ID: NORMAL
RAD ONC ARIA COURSE INTENT: NORMAL
RAD ONC ARIA COURSE LAST TREATMENT DATE: NORMAL
RAD ONC ARIA COURSE SESSION NUMBER: 8
RAD ONC ARIA COURSE START DATE: NORMAL
RAD ONC ARIA COURSE TREATMENT ELAPSED DAYS: 9
RAD ONC ARIA PLAN FRACTIONS TREATED TO DATE: 8
RAD ONC ARIA PLAN ID: NORMAL
RAD ONC ARIA PLAN NAME: NORMAL
RAD ONC ARIA PLAN PRESCRIBED DOSE PER FRACTION: 3 GY
RAD ONC ARIA PLAN PRIMARY REFERENCE POINT: NORMAL
RAD ONC ARIA PLAN TOTAL FRACTIONS PRESCRIBED: 10
RAD ONC ARIA PLAN TOTAL PRESCRIBED DOSE: 3000 CGY
RAD ONC ARIA REFERENCE POINT DOSAGE GIVEN TO DATE: 24 GY
RAD ONC ARIA REFERENCE POINT ID: NORMAL
RAD ONC ARIA REFERENCE POINT SESSION DOSAGE GIVEN: 3 GY

## 2025-06-06 PROCEDURE — G6002 STEREOSCOPIC X-RAY GUIDANCE: HCPCS | Performed by: STUDENT IN AN ORGANIZED HEALTH CARE EDUCATION/TRAINING PROGRAM

## 2025-06-06 PROCEDURE — 77387 GUIDANCE FOR RADJ TX DLVR: CPT | Performed by: STUDENT IN AN ORGANIZED HEALTH CARE EDUCATION/TRAINING PROGRAM

## 2025-06-06 PROCEDURE — 77412 RADIATION TX DELIVERY LVL 3: CPT | Performed by: STUDENT IN AN ORGANIZED HEALTH CARE EDUCATION/TRAINING PROGRAM

## 2025-06-09 ENCOUNTER — APPOINTMENT (OUTPATIENT)
Dept: RADIATION ONCOLOGY | Facility: HOSPITAL | Age: 80
End: 2025-06-09
Attending: RADIOLOGY
Payer: COMMERCIAL

## 2025-06-09 LAB
RAD ONC ARIA COURSE FIRST TREATMENT DATE: NORMAL
RAD ONC ARIA COURSE ID: NORMAL
RAD ONC ARIA COURSE INTENT: NORMAL
RAD ONC ARIA COURSE LAST TREATMENT DATE: NORMAL
RAD ONC ARIA COURSE SESSION NUMBER: 9
RAD ONC ARIA COURSE START DATE: NORMAL
RAD ONC ARIA COURSE TREATMENT ELAPSED DAYS: 12
RAD ONC ARIA PLAN FRACTIONS TREATED TO DATE: 9
RAD ONC ARIA PLAN ID: NORMAL
RAD ONC ARIA PLAN NAME: NORMAL
RAD ONC ARIA PLAN PRESCRIBED DOSE PER FRACTION: 3 GY
RAD ONC ARIA PLAN PRIMARY REFERENCE POINT: NORMAL
RAD ONC ARIA PLAN TOTAL FRACTIONS PRESCRIBED: 10
RAD ONC ARIA PLAN TOTAL PRESCRIBED DOSE: 3000 CGY
RAD ONC ARIA REFERENCE POINT DOSAGE GIVEN TO DATE: 27 GY
RAD ONC ARIA REFERENCE POINT ID: NORMAL
RAD ONC ARIA REFERENCE POINT SESSION DOSAGE GIVEN: 3 GY

## 2025-06-09 PROCEDURE — 77412 RADIATION TX DELIVERY LVL 3: CPT | Performed by: RADIOLOGY

## 2025-06-09 PROCEDURE — G6002 STEREOSCOPIC X-RAY GUIDANCE: HCPCS | Performed by: RADIOLOGY

## 2025-06-09 PROCEDURE — 77387 GUIDANCE FOR RADJ TX DLVR: CPT | Performed by: RADIOLOGY

## 2025-06-10 ENCOUNTER — APPOINTMENT (OUTPATIENT)
Dept: RADIATION ONCOLOGY | Facility: HOSPITAL | Age: 80
End: 2025-06-10
Attending: RADIOLOGY
Payer: COMMERCIAL

## 2025-06-10 LAB
RAD ONC ARIA COURSE FIRST TREATMENT DATE: NORMAL
RAD ONC ARIA COURSE ID: NORMAL
RAD ONC ARIA COURSE INTENT: NORMAL
RAD ONC ARIA COURSE LAST TREATMENT DATE: NORMAL
RAD ONC ARIA COURSE SESSION NUMBER: 10
RAD ONC ARIA COURSE START DATE: NORMAL
RAD ONC ARIA COURSE TREATMENT ELAPSED DAYS: 13
RAD ONC ARIA PLAN FRACTIONS TREATED TO DATE: 10
RAD ONC ARIA PLAN ID: NORMAL
RAD ONC ARIA PLAN NAME: NORMAL
RAD ONC ARIA PLAN PRESCRIBED DOSE PER FRACTION: 3 GY
RAD ONC ARIA PLAN PRIMARY REFERENCE POINT: NORMAL
RAD ONC ARIA PLAN TOTAL FRACTIONS PRESCRIBED: 10
RAD ONC ARIA PLAN TOTAL PRESCRIBED DOSE: 3000 CGY
RAD ONC ARIA REFERENCE POINT DOSAGE GIVEN TO DATE: 30 GY
RAD ONC ARIA REFERENCE POINT ID: NORMAL
RAD ONC ARIA REFERENCE POINT SESSION DOSAGE GIVEN: 3 GY

## 2025-06-10 PROCEDURE — 77412 RADIATION TX DELIVERY LVL 3: CPT | Performed by: RADIOLOGY

## 2025-06-10 PROCEDURE — G6002 STEREOSCOPIC X-RAY GUIDANCE: HCPCS | Performed by: RADIOLOGY

## 2025-06-10 PROCEDURE — 77336 RADIATION PHYSICS CONSULT: CPT | Performed by: RADIOLOGY

## 2025-06-10 PROCEDURE — 77387 GUIDANCE FOR RADJ TX DLVR: CPT | Performed by: RADIOLOGY

## 2025-06-21 DIAGNOSIS — R52 PAIN: ICD-10-CM

## 2025-06-23 ENCOUNTER — HOSPITAL ENCOUNTER (OUTPATIENT)
Dept: RADIOLOGY | Age: 80
Discharge: HOME | End: 2025-06-23
Attending: INTERNAL MEDICINE
Payer: COMMERCIAL

## 2025-06-23 DIAGNOSIS — G89.3 CANCER RELATED PAIN: ICD-10-CM

## 2025-06-23 DIAGNOSIS — R97.20 ELEVATED PSA: ICD-10-CM

## 2025-06-23 DIAGNOSIS — Z51.5 PALLIATIVE CARE ENCOUNTER: ICD-10-CM

## 2025-06-23 DIAGNOSIS — C79.51 METASTATIC CANCER TO BONE (HCC): ICD-10-CM

## 2025-06-23 DIAGNOSIS — C61 PROSTATE CANCER (HCC): ICD-10-CM

## 2025-06-23 DIAGNOSIS — R52 PAIN: ICD-10-CM

## 2025-06-23 PROCEDURE — A9596 HB ILLUCCIX - GA 68 PSMA -11, PER MCI: HCPCS

## 2025-06-23 PROCEDURE — 78815 PET IMAGE W/CT SKULL-THIGH: CPT

## 2025-06-23 RX ORDER — IBUPROFEN 800 MG/1
800 TABLET, FILM COATED ORAL EVERY 8 HOURS PRN
Qty: 30 TABLET | Refills: 5 | Status: SHIPPED | OUTPATIENT
Start: 2025-06-23

## 2025-06-24 ENCOUNTER — TELEPHONE (OUTPATIENT)
Age: 80
End: 2025-06-24

## 2025-06-24 RX ORDER — IBUPROFEN 800 MG/1
800 TABLET, FILM COATED ORAL EVERY 8 HOURS PRN
Qty: 30 TABLET | Refills: 0 | OUTPATIENT
Start: 2025-06-24

## 2025-06-24 RX ORDER — OXYCODONE HYDROCHLORIDE 5 MG/1
5 TABLET ORAL EVERY 8 HOURS PRN
Qty: 60 TABLET | Refills: 0 | Status: SHIPPED | OUTPATIENT
Start: 2025-06-24

## 2025-06-24 NOTE — TELEPHONE ENCOUNTER
[id number] ** 04/21/2025 04/20/2025 oxyCODONE HCL (Tablet) 90.0 30 5 MG 22.50 SUDARSHAN WITT [facility] [place] 0 / 0 [place]   1 [id number] ** 04/18/2025 04/18/2025 oxyCODONE HCL (Tablet) 12.0 4 5 MG 22.50 ANDREA [facility] [place] / 0 [place]   1 [id number] ** 04/14/2025 04/14/2025 oxyCODONE HCL (Tablet) 12.0 4 5 MG 22.50 CARMEL CHURCHILL [facility] [place] / 0 [place]

## 2025-06-24 NOTE — TELEPHONE ENCOUNTER
Pt daughter Carmen was told that Dr. Salas would be able to send out a script for pt's ibuprofen (MOTRIN) 800 mg tablets. She stated that [facility] office gives a hard time getting the refill out.     Carmen would also like to speak to Dr. Salas about pt condition and the recent PET scan he completed (don't see her on communication consent form). She can be reached at [phone]. Thank you.

## 2025-06-24 NOTE — TELEPHONE ENCOUNTER
Called and spoke to Colby. I am still waiting on Dr. Salas to review his PET scan and let me know what I can relay.  She has been seeing patient's in office all day. I will follow up with him tomorrow.     As far as the ibuprofen, I see this was filled by his family doctor yesterday, and he was given 5 refills on it. [facility] was [facility] his [facility]. Colby was also mentioning a lot of back pain he has been having. He is taking the ibuprofen and oxy for it. Recommended trying alternating hot or cold compresses or trying lidocaine patches to see if it helps on top of the pain medication. If he is experiencing an increase in pain sooner then when he is due for his next pain pill, he should reach out to palliative to see if he can take more frequently. Colby voiced understanding.

## 2025-06-25 ENCOUNTER — TELEPHONE (OUTPATIENT)
Age: 80
End: 2025-06-25

## 2025-06-25 NOTE — TELEPHONE ENCOUNTER
Pt daughter Carmen called and would like a call back from Dr. Salas to go over the pet scan. She said her father was confused with the information he was receiving. Thank you

## 2025-06-25 NOTE — TELEPHONE ENCOUNTER
"Called and spoke to Colby. Dr. Salas reviewed his PET scan results and said, \"Diffuse bony disease all PSMA PET positive, thus prostate not MGUS, has some lower abd nodes c/w prostate, disease in prostate nothing chest, liver   So bone disease appears all prostate, not suggestive has myeloma but MGUS.\" Dr. Salas can discuss further with him at his next appointment. Colby voiced understanding and gave me permission to call his daughter, Carmen.     Called and spoke to Carmen to review PET results. Carmen states that Colby has been having a lot of pain and does not leave his bed much at all. He has started to take his oxy more often, but is still not taking as often as he should and thinks he needs an adjustment in his pain regimen. I let her know I would send a message to palliative so that they can give recommendations. They may need to see Colby in office first, since he cancelled his follow up appointment with them. Carmen voiced understanding and appreciated the call.  "

## 2025-06-27 DIAGNOSIS — Z51.5 PALLIATIVE CARE ENCOUNTER: ICD-10-CM

## 2025-06-27 DIAGNOSIS — G89.3 CANCER RELATED PAIN: ICD-10-CM

## 2025-06-27 DIAGNOSIS — C61 PROSTATE CANCER (HCC): ICD-10-CM

## 2025-06-27 NOTE — TELEPHONE ENCOUNTER
Medication: oxyCODONE (Roxicodone) 5 immediate release tablet     Dose/Frequency: Take 1 tablet (5 mg total) by mouth every 8 (eight) hours as needed for moderate pain Max Daily Amount: 15 mg     Quantity: 60 tablet    Pharmacy: [facility]/pharmacy #[id number] - [place], [place] - [address] [place] [address]     Office:   [x] PCP/Provider - Armando Warren, DO   [] Speciality/Provider -     Does the patient have enough for 3 days?   [] Yes   [x] No - Send as HP to POD

## 2025-06-30 DIAGNOSIS — G89.3 CANCER RELATED PAIN: ICD-10-CM

## 2025-06-30 DIAGNOSIS — Z51.5 PALLIATIVE CARE ENCOUNTER: ICD-10-CM

## 2025-06-30 DIAGNOSIS — C61 PROSTATE CANCER (HCC): ICD-10-CM

## 2025-06-30 RX ORDER — OXYCODONE HYDROCHLORIDE 5 MG/1
5 TABLET ORAL EVERY 8 HOURS PRN
Qty: 60 TABLET | Refills: 0 | Status: SHIPPED | OUTPATIENT
Start: 2025-06-30

## 2025-06-30 NOTE — TELEPHONE ENCOUNTER
Refill must be reviewed and completed by the office or provider. The refill is unable to be approved or denied by the medication management team.      Patient Id Prescription # Sold Filled Written Drug Label Qty Days Strength MME* Prescriber Pharmacy Payment REFILL #/Auth State Detail   1 [id number] ** 05/05/2025 05/05/2025 LORazepam (Tablet) 1.0 1 0.5 MG NA ANDREA [facility] [place] 0 / 0 [place]    1 [id number] ** 04/21/2025 04/20/2025 oxyCODONE HCL (Tablet) 90.0 30 5 MG 22.50 [facility] [place] 0 / 0 [place]

## 2025-07-01 RX ORDER — OXYCODONE HYDROCHLORIDE 5 MG/1
5 TABLET ORAL EVERY 8 HOURS PRN
Qty: 60 TABLET | Refills: 0 | OUTPATIENT
Start: 2025-07-01

## 2025-07-02 ENCOUNTER — TELEPHONE (OUTPATIENT)
Age: 80
End: 2025-07-02

## 2025-07-02 NOTE — TELEPHONE ENCOUNTER
Ranulfo's daughter Carmen calling in, she would like to know if 7/8/25 visit can be virtual. Please call her to confirm at [phone], thank you!

## 2025-07-05 ENCOUNTER — APPOINTMENT (OUTPATIENT)
Dept: LAB | Age: 80
End: 2025-07-05
Attending: INTERNAL MEDICINE
Payer: COMMERCIAL

## 2025-07-05 DIAGNOSIS — C61 PROSTATE CANCER (HCC): ICD-10-CM

## 2025-07-05 LAB
ALBUMIN SERPL BCG-MCNC: 4.4 G/DL (ref 3.5–5)
ALP SERPL-CCNC: 124 U/L (ref 34–104)
ALT SERPL W P-5'-P-CCNC: 18 U/L (ref 7–52)
ANION GAP SERPL CALCULATED.3IONS-SCNC: 8 MMOL/L (ref 4–13)
AST SERPL W P-5'-P-CCNC: 18 U/L (ref 13–39)
BASOPHILS # BLD AUTO: 0.1 THOUSANDS/ÂΜL (ref 0–0.1)
BASOPHILS NFR BLD AUTO: 1 % (ref 0–1)
BILIRUB SERPL-MCNC: 0.41 MG/DL (ref 0.2–1)
BUN SERPL-MCNC: 12 MG/DL (ref 5–25)
CALCIUM SERPL-MCNC: 9.5 MG/DL (ref 8.4–10.2)
CHLORIDE SERPL-SCNC: 104 MMOL/L (ref 96–108)
CO2 SERPL-SCNC: 28 MMOL/L (ref 21–32)
CREAT SERPL-MCNC: 0.89 MG/DL (ref 0.6–1.3)
EOSINOPHIL # BLD AUTO: 0.51 THOUSAND/ÂΜL (ref 0–0.61)
EOSINOPHIL NFR BLD AUTO: 7 % (ref 0–6)
ERYTHROCYTE [DISTWIDTH] IN BLOOD BY AUTOMATED COUNT: 14.1 % (ref 11.6–15.1)
GFR SERPL CREATININE-BSD FRML MDRD: 80 ML/MIN/1.73SQ M
GLUCOSE SERPL-MCNC: 90 MG/DL (ref 65–140)
HCT VFR BLD AUTO: 39.5 % (ref 36.5–49.3)
HGB BLD-MCNC: 13.2 G/DL (ref 12–17)
IMM GRANULOCYTES # BLD AUTO: 0.04 THOUSAND/UL (ref 0–0.2)
IMM GRANULOCYTES NFR BLD AUTO: 1 % (ref 0–2)
LDH SERPL-CCNC: 147 U/L (ref 140–271)
LYMPHOCYTES # BLD AUTO: 0.72 THOUSANDS/ÂΜL (ref 0.6–4.47)
LYMPHOCYTES NFR BLD AUTO: 10 % (ref 14–44)
MAGNESIUM SERPL-MCNC: 2 MG/DL (ref 1.9–2.7)
MCH RBC QN AUTO: 32 PG (ref 26.8–34.3)
MCHC RBC AUTO-ENTMCNC: 33.4 G/DL (ref 31.4–37.4)
MCV RBC AUTO: 96 FL (ref 82–98)
MONOCYTES # BLD AUTO: 0.95 THOUSAND/ÂΜL (ref 0.17–1.22)
MONOCYTES NFR BLD AUTO: 13 % (ref 4–12)
NEUTROPHILS # BLD AUTO: 4.94 THOUSANDS/ÂΜL (ref 1.85–7.62)
NEUTS SEG NFR BLD AUTO: 68 % (ref 43–75)
NRBC BLD AUTO-RTO: 0 /100 WBCS
PLATELET # BLD AUTO: 333 THOUSANDS/UL (ref 149–390)
PMV BLD AUTO: 9 FL (ref 8.9–12.7)
POTASSIUM SERPL-SCNC: 4.1 MMOL/L (ref 3.5–5.3)
PROT SERPL-MCNC: 7.5 G/DL (ref 6.4–8.4)
PSA SERPL-MCNC: 30.46 NG/ML (ref 0–4)
RBC # BLD AUTO: 4.12 MILLION/UL (ref 3.88–5.62)
SODIUM SERPL-SCNC: 140 MMOL/L (ref 135–147)
TESTOST SERPL-MSCNC: <10 NG/DL (ref 175–781)
WBC # BLD AUTO: 7.26 THOUSAND/UL (ref 4.31–10.16)

## 2025-07-05 PROCEDURE — 84403 ASSAY OF TOTAL TESTOSTERONE: CPT

## 2025-07-05 PROCEDURE — 85025 COMPLETE CBC W/AUTO DIFF WBC: CPT

## 2025-07-05 PROCEDURE — 83615 LACTATE (LD) (LDH) ENZYME: CPT

## 2025-07-05 PROCEDURE — 80053 COMPREHEN METABOLIC PANEL: CPT

## 2025-07-05 PROCEDURE — 84153 ASSAY OF PSA TOTAL: CPT

## 2025-07-05 PROCEDURE — 36415 COLL VENOUS BLD VENIPUNCTURE: CPT

## 2025-07-05 PROCEDURE — 83735 ASSAY OF MAGNESIUM: CPT

## 2025-07-05 NOTE — PROGRESS NOTES
Name: Ranulfo Krishnamurthy      : 1945      MRN: [id number]  Encounter Provider: Chyna Salas MD  Encounter Date: 2025   Encounter department: [facility]  :  Assessment & Plan    MGUS (monoclonal gammopathy of unknown significance)  M spike     IgG kappa paraprotein 0.32 mg/dL-will get baseline bone marrow especially with bony disease which is more likely prostate but could be myeloma defining        2025     Bone marrow biopsy 2025 c/w MGUS    A -C. Bone marrow, left iliac crest, biopsy and aspirate:  - Mildly kappa skewed plasma cell population without significant quantitative increase (less than 5% overall), cannot exclude reactive versus low level monoclonal gammopathy of undetermined significance (MGUS) (see note).  - Maturing trilineage hematopoiesis with increased cellularity without features of atypia/dysplasia or increased blasts, favor reactive.  - Adequate stainable storage iron.  - Normal reticulin fibers without fibrosis.  - Negative for collagen fibrosis, granulomata, vasculitis, necrosis.    2025     Labs labs 2025    Will need repeat labs in 3 months    M protein 0.32 mg/dL IgG kappa 75 IgG lambda 46 FLC ratio 1.65     WBC 7.3 Hgb 13.2 MCV 96 plts 333 ANC [id number]        Prostate cancer (HCC)      bicalutamide (CASODEX) 50 mg tablet; Take 1 tablet (50 mg total) by mouth daily     Pt is a 79 y.o. male with PMHX HTN, arthritis, PVD who presented to the ED on 2025. Patient presents with right-sided lower back pain that has persisted for the past week.  Patient states that week prior he was out fishing and may have overly exerted himself.       Found to have lytic bone lesions on Xray-no workup done at time and referred to Heme Onc     Seen in ER for back pain 2025       Labs 2025      Na 141 K 3.9 Cr 0.97 Ca 9.2 ALT/AST 13/16 lipase 12 WBC 11.1 Hgb 14.1  MCV 94 plts 384 ANC [id number]         CT AP 2024 with  the following       ABDOMINOPELVIC CAVITY: No ascites. No pneumoperitoneum.   Enlarged left common iliac node measuring 2.3 x 1.7 cm series 302 image 109   5.3 x 3.8 x 6.4 cm left external iliac node series 302   image 131.   Multiple slightly prominent mesenteric nodes are seen, the largest with a short axis of 12 mm series 302 image 56.      VESSELS: Atherosclerosis without abdominal aortic aneurysm.   Ectasia of the infrarenal abdominal aorta measuring 2.8 cm.      PELVIS      REPRODUCTIVE ORGANS: Enlarged prostate. Bulging of the left posterior mid gland.   Right hydrocele.         URINARY BLADDER: Unremarkable.      ABDOMINAL WALL/INGUINAL REGIONS: Unremarkable.      BONES: Multiple lytic bony lesions at T10, T11, L1 and L2 with paravertebral soft tissue swelling at T11.   Pathologic compression fracture is seen at T11. There is lytic bony lesion with mass extending in the canal at L2 causing canal stenosis.   Large lytic bony lesion is seen in the left iliac bone 302/116.      IMPRESSION:         1. Left common iliac and external iliac adenopathy with nonspecific mildly prominent mesenteric nodes. Differential diagnosis includes metastases from prostate cancer or other malignancy or lymphoma.   2. Multiple metastatic bony lesions in the thoracic, lumbar spine and left iliac bone with pathologic compression at T11. Mass extending in the canal at L2 causing canal stenosis.   Evaluation with MRI of the lower thoracic thoracic and lumbar spine is recommended.      5/7/2025          Was supposed to have PSMA PET based on markedly elevated PSA but wasn't done despite two requests to change from FDG to PSMA     Will need to repeat PSMA as multiple areas of issues on PET as below     PET was as below 5/1/2025    1. Mildly FDG avid left posterior prostate exophytic lesion, SUV max 3.3, with multiple mildly FDG avid lytic osseous lesions and FDG avid iliac chain lymphadenopathy. Findings favored to represent  metastatic prostate cancer. Recommend tissue   confirmation and PSMA PET/CT if clinical appropriate.     2. Left lower lobe 1.9 cm pulmonary nodule without significant FDG activity. Findings are indeterminate and may represent incidental benign lesion, non-FDG avid primary lung malignancy, post infectious/inflammatory process, or additional prostate   metastatic lesion. Follow-up on PSMA PET/CT and tissue sampling as clinically appropriate.     3. FDG avid left parotid 1.8 cm mass suspicious for primary salivary gland neoplasm such as a pleomorphic adenoma or Warthin's tumor. Consider tissue sampling for further evaluation.     Thus there are several areas of concern     Left parotid 1.3x1.8 SUV max 11-will send to ENT/possible biopsy     Chest-1.9x1.6x1.6 LLL nodule SUV 0.9-again, PSMA may have been positive but this is a sizable nodule that may need biopsy   L common iliac 2.2x1.8 cm SUV 2.7     Left external iliac 5.4x3.8.6.3 cm SUV 4.4     Prostatomegaly c/w      Multiple lytic lesions T11,T10, L2      MRI T spine done outside, was unable to do L spine as could lay flat     Involvement demonstrated in all vertebral bodies with compression T11-no stenosis of the spinal canal.  T10 with pedicle involvement as well as T11.  L1/L2 pedicle involved Mild pathologic wedge compression fracture T11     In addition to above, lab work done with      M protein  IgG kappa M spike 0.32 g/dL Na 138 K 4.0 Cr 1.0 Ca 9.4 Mg 2.0 ALT/AST 20/19 TB 0.41  B2M 3.2  Testosterone 223 WBC 8/8 Hgb 14.1 MCV 99 plts 436      Continues with significant back pain     5/28/2025     Was seen by Rad Onc-started RT for 10 days today 5/28/2025     Feels improved     Tolerating lupron/casodex -will continue on this for now     PSMA PET to be scheduled in late June-until have PET, will hold on changing casodex to AR blocker such as enzalutamide (160 mg daily) , daralutamide (600 mg bid)   or apalutamide (240 mg daily)     Pain  better, will refer to Palliative      7/8/2025    PSMA PET 6/23/2025      1. Innumerable PSMA avid skeletal lesions, most in keeping with widespread prostate metastases  2. Lower abdominal and pelvic PSMA avid adenopathy, again most suggestive of metastatic prostate cancer  3. Left posterolateral prostate lesion potentially extending across the capsule. No seminal vesicle involvement  4. The left lower lobe approximately 1.6 cm pulmonary nodule remains nonspecific as it is not PSMA or FDG avid. The left parotid lesion is not PSMA avid.     PSA 30.5 down from 688     Testosterone <10    Suggests castrate sensitive     Lupron was due 7/2/2025-will need to reschedule    Will change from casodex to enzalutamide-did discuss before and will now change to 160 mg daily. (40 and 80 mg forms)    Risks/benefits/toxicities discussed/ oral consent obtained    Will repeat PSMA PET in 6 months    Will repeat PSA/testosterone in 3 months            Summary/Recommendations      Prostate:      ; testosterone 223 will start Lupron/ADT with casodex 50 mg daily for flare.  Lupron for now 7.5 mg IM monthly started will remain on casodex for now      Bony lesions/external and internal left iliacs most likely metastatic prostate but will now get PSMA that was requested and not done when PSA was found to be 686; PSMA confirmed as above that this appears c/w prostate and not a myeloma defining event     Rad Onc consult for T11/back pain completed 10 fractions    Back pain remains but improved since RT; using heating pad    May need to consider PT has been not moving much-will start to walk more    Will have social work discuss issues/stair master is a question as steps difficult    Palliative care needs to see patient-given script for oxycodone 5 mg (90 tabs) for now but they need to provide continued pain management      Will consider bisphosphonate but will hold for now    Labs in 3 months including PSA/testosterone/ myeloma  labs    Follow up 3 months telemedicine              History of Present Illness   No chief complaint on file.    Pt is a 79 y.o. male who presents to the ED on April 14, 2025. Patient presents with right-sided lower back pain that has persisted for the past week.  Patient states that week prior he was out fishing and may have overly exerted himself.  Patient attempted to treat back pain with Aleve OTC with moderate improvement in pain.  However, earlier this morning at approximately 1:30 AM, patient woke up with significant back pain, prompting him to come to the ED.  Patient states that he still been able to walk independently, however states that walking is limited due to pain.  Patient also recently recovered from what he describes as a stomach bug that he and his son who was at bedside was also recovering from.  Patient states that pain is located to the right side, no radiation to the posterior lateral right leg. Patient otherwise denies any fever, chills, chest pain, current nausea, current vomiting, current diarrhea, urinary distention, urinary incontinence, fecal incontinence, abdominal pain, radiating pain, ambulatory dysfunction, neurologic dysfunction, saddle anesthesia.  Patient also denies any IV drug use, illicit drug use. Still currently smoking.  ROS otherwise negative.  No other concerns at this time.     This patient presents with back pain most consistent with compression fractures of the thoracic vertebrae concerning for spinal mets of malignancy with unknown primary. No back pain red flags on history or physical. Presentation not consistent with cauda equina (no bowel or urinary incontinence/retention, no saddle anesthesia, no distal weakness), AAA, viscus perforation, osteomyelitis or epidural abscess (no IVDU, vertebral tenderness), renal colic, pyelonephritis (afebrile, no CVAT, no urinary symptoms).  Due to imaging findings, patient was provided with ambulatory referral to hematology  oncology and neurosurgery.  Patient advised to continue pain management with OTC ibuprofen and Tylenol, patient provided course of oxycodone for breakthrough pain.  Discussed management and discharge plan with patient at bedside, who was in agreement, all questions answered, patient discharged home.           CT  AP 4/14/20225       LOWER CHEST: Subsegmental atelectasis in the left lower lobe.      LIVER/BILIARY TREE: Unremarkable.      GALLBLADDER: No calcified gallstones. No pericholecystic inflammatory change.      SPLEEN: Unremarkable.      PANCREAS: Unremarkable.      ADRENAL GLANDS: Low density lobulated thickening of bilateral adrenal glands, statistically likely due to small adenomas or adenomatous hyperplasia.      KIDNEYS/URETERS: No hydronephrosis. Renal vascular calcifications. Subcentimeter hypoattenuating renal lesion(s), too small to characterize but statistically likely benign, which do not warrant follow-up (Radiology June 2019).      STOMACH AND BOWEL: Stomach is unremarkable.   Small bowel loops show normal caliber.   No inflammatory changes or bowel obstruction.         APPENDIX: Normal.      ABDOMINOPELVIC CAVITY: No ascites. No pneumoperitoneum.   Enlarged left common iliac node measuring 2.3 x 1.7 cm series 302 image 109   5.3 x 3.8 x 6.4 cm left external iliac node series 302   image 131.   Multiple slightly prominent mesenteric nodes are seen, the largest with a short axis of 12 mm series 302 image 56.      VESSELS: Atherosclerosis without abdominal aortic aneurysm.   Ectasia of the infrarenal abdominal aorta measuring 2.8 cm.      PELVIS      REPRODUCTIVE ORGANS: Enlarged prostate. Bulging of the left posterior mid gland.   Right hydrocele.         URINARY BLADDER: Unremarkable.      ABDOMINAL WALL/INGUINAL REGIONS: Unremarkable.      BONES: Multiple lytic bony lesions at T10, T11, L1 and L2 with paravertebral soft tissue swelling at T11.   Pathologic compression fracture is seen at T11.  There is lytic bony lesion with mass extending in the canal at L2 causing canal stenosis.   Large lytic bony lesion is seen in the left iliac bone 302/116.      IMPRESSION:         1. Left common iliac and external iliac adenopathy with nonspecific mildly prominent mesenteric nodes. Differential diagnosis includes metastases from prostate cancer or other malignancy or lymphoma.   2. Mul   tiple metastatic bony lesions in the thoracic, lumbar spine and left iliac bone with pathologic compression at T11. Mass extending in the canal at L2 causing canal stenosis.   Evaluation with MRI of the lower thoracic thoracic and lumbar spine is recommended.         Patient states has had back pain intermittently for several months.  Does not remember when had PSA done last/exam.  No weight loss, 215 pounds.  Pain across both sides lower back as well as left flank/pelvis.  No urinary issues, no neurological issues     Has been smoking since teens; no recent lung evaluation -scan in ER was just AP     5/7/2025     CT PET  5/1/2025       FINDINGS:     VISUALIZED BRAIN:  No acute abnormalities are seen.     HEAD/NECK:  Lack of synchronization between PET and CT images within the head/neck secondary to motion, somewhat limiting evaluation.     FDG avid left parotid mass measuring 1.3 x 1.8 cm, SUV max 11 (series 1200, image 57).     No additional FDG avid lymph nodes/lesions.     CT images: No additional significant findings.     CHEST:  Left lower lobe peripheral nodule measuring 1.9 x 1.6 x 1.6 cm, with FDG activity similar to background soft tissue, SUV max 0.9. This nodule was excluded from the field-of-view on prior exam.     Additional left lower lobe 0.5 cm nodule (series 3, image 141), without significant FDG activity, however this is below the size threshold for FDG evaluation. This nodule was obscured by atelectasis on prior exam.     CT images: Mild emphysematous changes. Patent central airways. Moderate coronary artery  calcifications.     ABDOMEN:  Mildly FDG avid left common iliac lymph node measuring 2.2 x 1.8 cm, SUV max 2.7, unchanged in size from prior.     Redemonstrated borderline enlarged mesenteric lymph nodes without significant FDG activity, for example a left mid abdominal mesenteric lymph node measuring 1.2 cm short axis (series 3, image 204). These lymph nodes are grossly unchanged from prior.     CT images: Low-density lobulated thickening of bilateral adrenal glands, likely adenomatous hyperplasia. Small fat-containing umbilical hernia.     PELVIS:  Large FDG avid left external iliac lymph node measuring 5.4 x 3.8 x 6.3 cm, SUV max 4.4, unchanged in size from prior.     Redemonstrated prostatomegaly with left posterior mid gland bulging/exophytic lesion measuring approximately 2.1 x 2.2 cm, SUV max 3.3. Low-level FDG activity throughout the remainder of the prostate, particularly the inferior prostate, SUV max 2.2.     CT images: Small to moderate right hydrocele.     OSSEOUS STRUCTURES:  Redemonstrated multiple lytic bony lesions demonstrating mild FDG activity with representative examples as below.  - Left iliac bone lytic lesion with minimal FDG activity, SUV max 1.9.  - Focus of mild right posterior iliac bone FDG activity with associated cortical thinning, SUV max 2.3 (series 1200, image 229).  - L2 vertebral body lytic lesion extending into the left pedicle, SUV max 3.3 (series 1200, image 190).  -T11 vertebral body lytic lesions with associated pathologic compression fracture, SUV max 3.5.  - T10 vertebral body lytic lesion extending into the right pedicle and posterior elements, SUV max 2.7 (series 1200, image 152).     CT images: Spinal degenerative changes.     IMPRESSION:     1. Mildly FDG avid left posterior prostate exophytic lesion, SUV max 3.3, with multiple mildly FDG avid lytic osseous lesions and FDG avid iliac chain lymphadenopathy. Findings favored to represent metastatic prostate cancer.  Recommend tissue   confirmation and PSMA PET/CT if clinical appropriate.     2. Left lower lobe 1.9 cm pulmonary nodule without significant FDG activity. Findings are indeterminate and may represent incidental benign lesion, non-FDG avid primary lung malignancy, post infectious/inflammatory process, or additional prostate   metastatic lesion. Follow-up on PSMA PET/CT and tissue sampling as clinically appropriate.     3. FDG avid left parotid 1.8 cm mass suspicious for primary salivary gland neoplasm such as a pleomorphic adenoma or Warthin's tumor. Consider tissue sampling for further evaluation.        5/28/2025     Bone marrow 5/14/2025         A -C. Bone marrow, left iliac crest, biopsy and aspirate:  - Mildly kappa skewed plasma cell population without significant quantitative increase (less than 5% overall), cannot exclude reactive versus low level monoclonal gammopathy of undetermined significance (MGUS) (see note).  - Maturing trilineage hematopoiesis with increased cellularity without features of atypia/dysplasia or increased blasts, favor reactive.  - Adequate stainable storage iron.  - Normal reticulin fibers without fibrosis.  - Negative for collagen fibrosis, granulomata, vasculitis, necrosis.   Electronically signed by Leo Fernandez MD on 5/21/2025 at 1101 EDT   Microscopic Description     Bone marrow aspirate smears: The aspirate smears are cellular and adequate for evaluation with limited particles/spicules. Cellularity is composed of maturing trilineage hematopoiesis with a myeloid to erythroid ratio of 3-4:1.  Myelopoiesis: Myeloid lineage cells show orderly maturation and normal morphology without significant features of atypia/dysplasia or increased blasts (myeloblasts= less than 5%).  Erythropoiesis: Erythroid precursors show orderly maturation and normal morphology without significant features of atypia/dysplasia  Megakaryocytes: Scattered megakaryocytes are present with normal  morphology  Lymphocytes: Lymphocytes are not increased  Plasma cells: Plasma cells are not increased     Peripheral blood smear review shows white blood cells with normal morphology and distribution without significant features of atypia/dysplasia or circulating blasts.  Red blood cells show mild nonspecific anisopoikilocytosis without distinct diagnostic morphologic abnormality.  Platelets show normal quantity and morphology without significant satellitosis or clumping.     Bone marrow core biopsy and clot sections are adequate for evaluation.  The marrow cellularity is increased for patient age (approximately 50% overall with patchy distribution).  Myelopoiesis: Myeloid lineage cells show normal distribution and orderly maturation without distinct increase in myeloblasts by H&E stain  Erythropoiesis: Erythroid lineage cells show orderly maturation and distribution  Megakaryocytes: Megakaryocytes show normal-appearing quantity and distribution with normal morphology and no significant clusters  Lymphocytes: Lymphocytes are not significantly increased  Plasma cells: Plasma cells are not significantly increased     PAS stain performed on the core biopsy shows mixed maturing trilineage hematopoiesis with orderly appearing distribution and maturation  Iron stain performed on the core biopsy, clot, and aspirate shows adequate stainable storage iron and no ring sideroblasts.  Reticulin stain performed on the core biopsy shows normal reticulin fibers (grade 0 of 3).  Immunohistochemical stains performed with appropriate controls show:  CD34 highlights occasional scattered myeloblasts without significant quantitative increase (less than 5% overall)   highlights scattered immature myeloid and erythroid precursors  CD61 highlights scattered megakaryocytes with normal-appearing morphology and distribution  CD3 highlights scattered small T cells without distinct abnormal increase or abnormal aggregates   CD20 highlights  scattered small B cells without significant abnormal increase   highlights scattered plasma cells without significant quantitative increase (less than 5% overall)  In situ hybridization analysis shows the plasma cells to be slightly kappa skewed with a kappa to lambda ratio of approximately 3-4:1  IgLL5 in situ hybridization is negative  Keratin AE 1/3 is negative for metastatic carcinoma   Note     CBC  Component  Ref Range & Units (hover) 5/14/25 0844 4/17/25 0737 4/14/25 0435 3/26/24 0842 9/28/23 0812   WBC 9.01 8.78 11.07 High  8.96 8.09   RBC 4.26 4.57 4.42 4.77 4.70   Hemoglobin 13.5 14.1 14.1 15.0 14.5   Hematocrit 40.3 45.1 41.4 45.0 44.1   MCV 95 99 High  94 94 94   MCH 31.7 30.9 31.9 31.4 30.9   MCHC 33.5 31.3 Low  34.1 33.3 32.9   RDW 13.2 13.7 13.5 13.0 12.9   MPV 8.5 Low  9.1 8.7 Low  9.8 9.6   Platelets 352 436 High  384 317 337              Genetic analysis including plasma cell FISH cytogenetics are pending and will be reported as an addendum.     Overall, the combination of findings is not definitively specific, though there is a very low level plasma cell population with kappa predominance without overt restriction.  The patient's history of an IgG kappa monoclonal paraprotein is noted and these findings may be compatible with a low level kappa plasma cell monoclonal gammopathy of undetermined significance (MGUS).  Alternatively, the possibility of reactive changes with a pseudo clonal paraprotein cannot be entirely excluded.  The differential diagnosis may include infection, drug effect, toxin exposure, autoimmune disease, or other reactive inflammatory condition.     Additionally, it is noted that the patient presents with lytic bone lesions identified by imaging studies.  If clinically indicated to further evaluate this process and classifying the possibility of a plasma cell dyscrasia versus metastatic carcinoma and directed sampling of an accessible lesion may be of assistance.    "  Correlation with clinical impression and other laboratory findings with consideration for additional tissue sampling as clinically indicated is recommended.   Additional Information     All reported additional testing was performed with appropriately reactive controls.  These tests were developed and their performance characteristics determined by [facility] or appropriate performing facility, though some tests may be performed on tissues which have not been validated for performance characteristics (such as staining performed on alcohol exposed cell blocks and decalcified tissues).  Results should be interpreted with caution and in the context of the patients’ clinical condition. These tests may not be cleared or approved by the U.S. Food and Drug Administration, though the FDA has determined that such clearance or approval is not necessary. These tests are used for clinical purposes and they should not be regarded as investigational or for research. This laboratory has been approved by [place] 88, designated as a high-complexity laboratory and is qualified to perform these tests.  Interpretation performed at [facility], [address] [place] [address]   Gross Description     A. The specimen is received in formalin, labeled with the patient's name and hospital number, and is designated \" left iliac crest core\".  The specimen consists of multiple pink osseous tissue fragments measuring in aggregate of 1.3 x 1.0 x 0.3 cm.  The specimen is drained into an embedding bag.  Entirely submitted. One cassette. The specimen is placed into Immunocal after proper fixation time.    B. The specimen is received in formalin, labeled with the patient's name and hospital number, and is designated \" left iliac crest clot\".  The specimen consists of multiple red-brown hemorrhagic and clotted tissue fragments measuring in aggregate of 2.4 x 1.7 x 0.2 cm.  The specimen is drained into an embedding bag. " "Entirely submitted. One cassette.  C. The specimen is received, labeled with the patient's name and hospital number, and is designated \"Left iliac crest slides\". The specimen consists of microscopic slides submitted for histological review. No cassettes submitted.     Note: The estimated total formalin fixation time based upon information provided by the submitting clinician and the standard processing schedule is under 72 hours. VVonelli Mendoza       7/8/2025/    PSMA PET  6/23/2025          All images refer to series 3 unless otherwise specified.     VISUALIZED BRAIN:  No acute abnormalities are seen.     HEAD/NECK:  There is a physiologic distribution of the radiotracer. No PSMA avid cervical adenopathy is seen.     CT images: The known left parotid mass is not PSMA avid     CHEST:  No PSMA avid soft tissue lesions are seen.  Left lower lobe pulmonary nodule measuring 1.6 cm on image 134 is not significantly PSMA avid. (Trace avidity with SUV max of 1.6 is nonspecific). The subcentimeter left lower lobe pulmonary nodule on image 144 is not PSMA avid although somewhat small   for accurate assessment with PET/CT     CT images: Coronary artery calcifications.        ABDOMEN:  No PSMA avid soft tissue lesions are seen within the upper abdomen..     CT images: Small umbilical adipose containing hernia. Bilateral adrenal gland thickening.     LOWER ABDOMEN AND PELVIS:     Prostate: Focus of increased activity is seen in the left posterolateral peripheral zone, bulging or invading into the capsule/adjacent tissue as seen on image 265. Based on size of activity, the lesion measures approximately 1.2 cm in size. SUV max 11.     Seminal vesicles : No evidence of PSMA avid involvement.     Lymph nodes: Multiple PSMA avid lymph nodes within the lower abdomen and pelvis. Examples include mildly increased activity in the left periaortic tissue on image 5.5 without discrete enlarged lymph node. At the aortic bifurcation, left " common iliac   lymph node image 227 measures 0.9 cm with SUV max 11. Lymph node at the left iliac artery bifurcation, based on size of activity, measures approximately 2.7 x 1.6 cm, SUV max 31.     Other:  No additional PSMA avid lesions are seen.     CT images: Prostatomegaly including the median lobe producing mass effect upon the bladder base. Right hydrocele     OSSEOUS STRUCTURES:  There are innumerable PSMA avid skeletal lesions with the following examples:  - Subcentimeter right posterior skull lesion SUV max 4.3  - Left skull base lesion on image 52 with SUV max 4.7  - Multiple cervical spine lesions for example at C7 with SUV max 14  - Multiple bilateral upper extremity lesions for example the left scapular lytic lesion on image 86 with SUV max 4.9 and lytic right clavicular lesion with SUV max 10  - Multiple rib lesions and multiple sternal manubrial lesions for example in the left sternum on image 119 with SUV max 12  - Innumerable thoracic spine lesions with for example, lytic lesion in the left posterior elements 13  - Multiple lumbar lesions, example line L3 with SUV max 9.8  - Multiple sacral lesions, including right sacral wing lytic lesion SUV max 10 there are also multiple iliac, pubic and ischial lesions  - Proximal femoral lesions for example the left femoral neck with SUV max 4.1        CT images: Compression deformities of the thoracolumbar spine. Both lytic and sclerotic lesions are seen. Please refer to prior imaging studies of the spine for additional detail.     IMPRESSION:     1. Innumerable PSMA avid skeletal lesions, most in keeping with widespread prostate metastases  2. Lower abdominal and pelvic PSMA avid adenopathy, again most suggestive of metastatic prostate cancer  3. Left posterolateral prostate lesion potentially extending across the capsule. No seminal vesicle involvement  4. The left lower lobe approximately 1.6 cm pulmonary nodule remains nonspecific as it is not PSMA or FDG  avid. The left parotid lesion is not PSMA avid.        Oncology History  Cancer Staging   Prostate cancer (HCC)  Staging form: Prostate, AJCC 8th Edition  - Clinical: Stage IVB (cTX, cN0, pM1b, PSA: 680) - Signed by Jayden Oswald MD on 5/13/2025  Prostate specific antigen (PSA) range: 20 or greater      Oncology History   Prostate cancer (HCC)   5/7/2025 Initial Diagnosis     Prostate cancer (HCC)      5/7/2025 -  Hormone Therapy     Lupron 7.5 mg IM monthly with casodex      5/13/2025 -  Cancer Staged     Staging form: Prostate, AJCC 8th Edition  - Clinical: Stage IVB (cTX, cN0, pM1b, PSA: 680) - Signed by Jayden Oswald MD on 5/13/2025  Prostate specific antigen (PSA) range: 20 or greater           Oncology History         Pertinent Medical History             Oncology History   Cancer Staging   Prostate cancer (HCC)  Staging form: Prostate, AJCC 8th Edition  - Clinical: Stage IVB (cTX, cN0, pM1b, PSA: 680) - Signed by Jayden Oswald MD on 5/13/2025  Prostate specific antigen (PSA) range: 20 or greater  Oncology History   Prostate cancer (HCC)   5/7/2025 Initial Diagnosis    Prostate cancer (HCC)     5/7/2025 -  Hormone Therapy    Lupron 7.5 mg IM monthly with casodex     5/13/2025 -  Cancer Staged    Staging form: Prostate, AJCC 8th Edition  - Clinical: Stage IVB (cTX, cN0, pM1b, PSA: 680) - Signed by Jayden Oswald MD on 5/13/2025  Prostate specific antigen (PSA) range: 20 or greater          Pertinent Medical History      04/15/25:     05/04/25:     05/26/25:     07/05/25:      Review of Systems   Constitutional:  Positive for fatigue.   HENT: Negative.     Respiratory: Negative.     Cardiovascular: Negative.    Gastrointestinal: Negative.    Musculoskeletal:  Positive for arthralgias, back pain and myalgias.   Neurological:  Positive for weakness.   Hematological: Negative.    Psychiatric/Behavioral: Negative.             Objective   There were no vitals taken for this visit.    Pain Screening:     ECOG    2  Physical Exam not done telemedicine     Labs: I have reviewed the following labs:  Lab Results   Component Value Date/Time    WBC 9.01 05/14/2025 08:44 AM    RBC 4.26 05/14/2025 08:44 AM    Hemoglobin 13.5 05/14/2025 08:44 AM    Hematocrit 40.3 05/14/2025 08:44 AM    MCV 95 05/14/2025 08:44 AM    MCH 31.7 05/14/2025 08:44 AM    RDW 13.2 05/14/2025 08:44 AM    Platelets 352 05/14/2025 08:44 AM    Segmented % 65 04/17/2025 07:37 AM    Lymphocytes % 20 05/14/2025 08:44 AM    Lymphocytes % 19 04/17/2025 07:37 AM    Monocytes % 6 05/14/2025 08:44 AM    Monocytes % 9 04/17/2025 07:37 AM    Eosinophils % 3 05/14/2025 08:44 AM    Eosinophils Relative 5 04/17/2025 07:37 AM    Basophils % 1 05/14/2025 08:44 AM    Basophils Relative 1 04/17/2025 07:37 AM    Immature Grans % 1 04/17/2025 07:37 AM    Absolute Neutrophils 6.31 05/14/2025 08:44 AM    Absolute Neutrophils 5.77 04/17/2025 07:37 AM     Lab Results   Component Value Date/Time    Potassium 4.0 04/17/2025 07:37 AM    Chloride 103 04/17/2025 07:37 AM    CO2 24 04/17/2025 07:37 AM    BUN 17 04/17/2025 07:37 AM    Creatinine 1.00 04/17/2025 07:37 AM    Glucose, Fasting 92 04/17/2025 07:37 AM    Calcium 9.4 04/17/2025 07:37 AM    AST 19 04/17/2025 07:37 AM    ALT 20 04/17/2025 07:37 AM    Alkaline Phosphatase 139 (H) 04/17/2025 07:37 AM    Total Protein 7.5 04/17/2025 07:37 AM    Total Protein 7.1 04/17/2025 07:37 AM    Albumin 4.1 04/17/2025 07:37 AM    Total Bilirubin 0.41 04/17/2025 07:37 AM    eGFR 70 04/17/2025 07:37 AM           Administrative Statements   Encounter provider Chyna Salas MD    The Patient is located at Home and in the following state in which I hold an active license [place].    The patient was identified by name and date of birth. Ranulfo Krishnamurthy was informed that this is a telemedicine visit and that the visit is being conducted through the Epic Embedded platform. He agrees to proceed..  My office door was closed. No one else was in the  room.  He acknowledged consent and understanding of privacy and security of the video platform. The patient has agreed to participate and understands they can discontinue the visit at any time.    I have spent a total time of 40 minutes in caring for this patient on the day of the visit/encounter including Diagnostic results, Prognosis, Impressions, Counseling / Coordination of care, Documenting in the medical record, Reviewing/placing orders in the medical record (including tests, medications, and/or procedures), and Obtaining or reviewing history  , not including the time spent for establishing the audio/video connection.

## 2025-07-08 ENCOUNTER — DOCUMENTATION (OUTPATIENT)
Dept: HEMATOLOGY ONCOLOGY | Facility: CLINIC | Age: 80
End: 2025-07-08

## 2025-07-08 ENCOUNTER — TELEMEDICINE (OUTPATIENT)
Dept: HEMATOLOGY ONCOLOGY | Facility: CLINIC | Age: 80
End: 2025-07-08
Payer: COMMERCIAL

## 2025-07-08 ENCOUNTER — TELEPHONE (OUTPATIENT)
Dept: HEMATOLOGY ONCOLOGY | Facility: CLINIC | Age: 80
End: 2025-07-08

## 2025-07-08 DIAGNOSIS — D47.2 MGUS (MONOCLONAL GAMMOPATHY OF UNKNOWN SIGNIFICANCE): ICD-10-CM

## 2025-07-08 DIAGNOSIS — G89.3 CANCER RELATED PAIN: ICD-10-CM

## 2025-07-08 DIAGNOSIS — R52 PAIN: ICD-10-CM

## 2025-07-08 DIAGNOSIS — C61 PROSTATE CANCER (HCC): Primary | ICD-10-CM

## 2025-07-08 DIAGNOSIS — Z51.5 PALLIATIVE CARE ENCOUNTER: ICD-10-CM

## 2025-07-08 PROCEDURE — 99215 OFFICE O/P EST HI 40 MIN: CPT | Performed by: INTERNAL MEDICINE

## 2025-07-08 RX ORDER — BICALUTAMIDE 50 MG/1
50 TABLET, FILM COATED ORAL DAILY
Qty: 30 TABLET | Refills: 1 | Status: SHIPPED | OUTPATIENT
Start: 2025-07-08

## 2025-07-08 RX ORDER — ENZALUTAMIDE 40 MG/1
4 TABLET ORAL DAILY
Qty: 120 TABLET | Refills: 5 | Status: SHIPPED | OUTPATIENT
Start: 2025-07-08

## 2025-07-08 RX ORDER — OXYCODONE HYDROCHLORIDE 5 MG/1
5 TABLET ORAL EVERY 6 HOURS PRN
Qty: 90 TABLET | Refills: 0 | Status: SHIPPED | OUTPATIENT
Start: 2025-07-08

## 2025-07-08 RX ORDER — ACETAMINOPHEN 500 MG
1000 TABLET ORAL EVERY 8 HOURS PRN
Qty: 30 TABLET | Refills: 5 | Status: SHIPPED | OUTPATIENT
Start: 2025-07-08

## 2025-07-08 NOTE — PROGRESS NOTES
Received request from clinical for patient to start on Xtandi 160mg daily. Auth has been submitted via cover my meds and this is pending and marked as urgent. (Key: [id number])      Help desk # [phone]    BIN: [id number]  PCN: [id number]  ID: [id number]    UPDATE:  This has been approved

## 2025-07-09 ENCOUNTER — DOCUMENTATION (OUTPATIENT)
Dept: HEMATOLOGY ONCOLOGY | Facility: CLINIC | Age: 80
End: 2025-07-09

## 2025-07-09 NOTE — PROGRESS NOTES
Oncology Finance Advocacy Intake and Intervention  Oncology Finance Counselor/Advocate placed call to patient. This writer informed patient that this writer is here to assist patient with billing questions, financial assistance, payment/payment plans, quotes, copayment assistance, insurance optimization, and insurance navigation.    This writer conducted a thorough benefit review of copayment, deductible, and out of pocket cost. This information is documented below and has been reviewed with patient.     Copayment: $35  Deductible: n/a  Out of Pocket Cost: $6700/ $6700 remainin  Insurance optimization (Limited benefit vs self-pay): n/a  Patient assistance status: [employer] [place]  Free Drug Applications: n/a  Oral Chemo Application: n/a  BIN#:  PCN#:  GRP#:  Copay:$  Interventions:  An introductory outreach call was made to the patient, who answered. This writer introduced herself and explained the scope of the Oncology  role. This writer provided contact information, reviewed insurance details, discussed the upcoming treatment date and time, and answered all questions. discussed the financial assistance application which was requested to be mailed  Discussed patient assistance via foundation, [employer], [employer] and FA patient was agreeable to enrolling in TAF which has open funding to cover cost of Xtandi OOP as well as Lupron infusion   TAF Information forwarded to [facility] and Cassie for submission of claims  ID# [id number]  BIN# [id number]  PCN  AS  GRP   [id number]  EFF 1-1-25  THRU 12-31-25              Information above was review thoroughly with patient and patient was advise of possible assistance programs/interventions. If any question arise patient can contact this writer at below information. This information was given to patient at time of contact.      Zeynep Leugn  Phone:[phone]  Email: [email or web address]

## 2025-07-11 DIAGNOSIS — C61 PROSTATE CANCER (HCC): Primary | ICD-10-CM

## 2025-07-14 ENCOUNTER — PATIENT OUTREACH (OUTPATIENT)
Dept: CASE MANAGEMENT | Facility: OTHER | Age: 80
End: 2025-07-14

## 2025-07-15 ENCOUNTER — TELEPHONE (OUTPATIENT)
Age: 80
End: 2025-07-15

## 2025-07-15 ENCOUNTER — HOSPITAL ENCOUNTER (OUTPATIENT)
Dept: INFUSION CENTER | Facility: CLINIC | Age: 80
Discharge: HOME | End: 2025-07-15
Attending: INTERNAL MEDICINE
Payer: COMMERCIAL

## 2025-07-15 ENCOUNTER — OFFICE VISIT (OUTPATIENT)
Dept: RADIATION ONCOLOGY | Facility: HOSPITAL | Age: 80
End: 2025-07-15
Attending: RADIOLOGY

## 2025-07-15 DIAGNOSIS — C79.51 MALIGNANT NEOPLASM METASTATIC TO BONE (HCC): Primary | ICD-10-CM

## 2025-07-15 DIAGNOSIS — C61 PROSTATE CANCER (HCC): Primary | ICD-10-CM

## 2025-07-15 PROCEDURE — 96402 CHEMO HORMON ANTINEOPL SQ/IM: CPT

## 2025-07-15 RX ADMIN — LEUPROLIDE ACETATE 7.5 MG: KIT at 14:25

## 2025-07-16 NOTE — TELEPHONE ENCOUNTER
Called and spoke to Colby. He can start the xtandi at any time. He does not have to wait. Colby stated he took his casodex this morning, so he will start the xtandi tomorrow. Reinforced that he should try his best to take all 4 pills at once, the same time everyday. Colby voiced understanding and appreciated the call

## 2025-07-20 DIAGNOSIS — C61 PROSTATE CANCER (HCC): ICD-10-CM

## 2025-07-21 ENCOUNTER — PATIENT OUTREACH (OUTPATIENT)
Dept: CASE MANAGEMENT | Facility: OTHER | Age: 80
End: 2025-07-21

## 2025-07-21 NOTE — PROGRESS NOTES
OSW placed outreach TC this day to follow up on a referral regarding a stair glide. OSW introduced self and role to the pt. He states that his son was wondering if it is covered by insurance. Pts son was then on speaker. This writer encouraged him to call his insurance to inquire, however stated that typically they are not covered. The pt stated that this was all they wanted to know and thanked this writer for the outreach. OSW will close the referral at this time.

## 2025-07-22 RX ORDER — BICALUTAMIDE 50 MG/1
50 TABLET, FILM COATED ORAL DAILY
Refills: 1 | OUTPATIENT
Start: 2025-07-22

## 2025-07-30 ENCOUNTER — PATIENT OUTREACH (OUTPATIENT)
Dept: HEMATOLOGY ONCOLOGY | Facility: CLINIC | Age: 80
End: 2025-07-30

## 2025-08-11 ENCOUNTER — TELEPHONE (OUTPATIENT)
Age: 80
End: 2025-08-11

## 2025-08-12 ENCOUNTER — HOSPITAL ENCOUNTER (OUTPATIENT)
Dept: INFUSION CENTER | Facility: CLINIC | Age: 80
Discharge: HOME | End: 2025-08-12
Attending: INTERNAL MEDICINE
Payer: COMMERCIAL

## 2025-08-14 ENCOUNTER — TELEMEDICINE (OUTPATIENT)
Dept: PALLIATIVE MEDICINE | Facility: CLINIC | Age: 80
End: 2025-08-14
Payer: COMMERCIAL